# Patient Record
Sex: MALE | Race: BLACK OR AFRICAN AMERICAN | NOT HISPANIC OR LATINO | Employment: FULL TIME | URBAN - METROPOLITAN AREA
[De-identification: names, ages, dates, MRNs, and addresses within clinical notes are randomized per-mention and may not be internally consistent; named-entity substitution may affect disease eponyms.]

---

## 2017-07-08 ENCOUNTER — HOSPITAL ENCOUNTER (EMERGENCY)
Facility: HOSPITAL | Age: 57
End: 2017-07-08
Attending: EMERGENCY MEDICINE | Admitting: EMERGENCY MEDICINE
Payer: COMMERCIAL

## 2017-07-08 ENCOUNTER — HOSPITAL ENCOUNTER (INPATIENT)
Facility: HOSPITAL | Age: 57
LOS: 12 days | DRG: 760 | End: 2017-07-20
Attending: PSYCHIATRY & NEUROLOGY | Admitting: INTERNAL MEDICINE
Payer: COMMERCIAL

## 2017-07-08 VITALS
WEIGHT: 165 LBS | TEMPERATURE: 97.4 F | RESPIRATION RATE: 16 BRPM | OXYGEN SATURATION: 98 % | SYSTOLIC BLOOD PRESSURE: 182 MMHG | DIASTOLIC BLOOD PRESSURE: 98 MMHG | HEART RATE: 64 BPM

## 2017-07-08 DIAGNOSIS — R90.89 ABNORMAL BRAIN MRI: ICD-10-CM

## 2017-07-08 DIAGNOSIS — F22 PARANOID PSYCHOSIS (HCC): Primary | ICD-10-CM

## 2017-07-08 DIAGNOSIS — A52.3 NEUROSYPHILIS IN MALE: ICD-10-CM

## 2017-07-08 DIAGNOSIS — F22 PARANOIA (HCC): ICD-10-CM

## 2017-07-08 DIAGNOSIS — I10 HYPERTENSION: ICD-10-CM

## 2017-07-08 DIAGNOSIS — R45.851 SUICIDAL IDEATION: Primary | ICD-10-CM

## 2017-07-08 DIAGNOSIS — R46.89 CHANGE IN BEHAVIOR: ICD-10-CM

## 2017-07-08 LAB
ALBUMIN SERPL BCP-MCNC: 3.7 G/DL (ref 3.5–5)
ALP SERPL-CCNC: 59 U/L (ref 46–116)
ALT SERPL W P-5'-P-CCNC: 96 U/L (ref 12–78)
AMPHETAMINES SERPL QL SCN: NEGATIVE
ANION GAP SERPL CALCULATED.3IONS-SCNC: 10 MMOL/L (ref 4–13)
AST SERPL W P-5'-P-CCNC: 61 U/L (ref 5–45)
ATRIAL RATE: 54 BPM
BARBITURATES UR QL: NEGATIVE
BASOPHILS # BLD AUTO: 0.04 THOUSANDS/ΜL (ref 0–0.1)
BASOPHILS NFR BLD AUTO: 1 % (ref 0–1)
BENZODIAZ UR QL: NEGATIVE
BILIRUB SERPL-MCNC: 0.44 MG/DL (ref 0.2–1)
BUN SERPL-MCNC: 17 MG/DL (ref 5–25)
CALCIUM SERPL-MCNC: 8.5 MG/DL (ref 8.3–10.1)
CHLORIDE SERPL-SCNC: 105 MMOL/L (ref 100–108)
CO2 SERPL-SCNC: 26 MMOL/L (ref 21–32)
COCAINE UR QL: NEGATIVE
CREAT SERPL-MCNC: 1.05 MG/DL (ref 0.6–1.3)
EOSINOPHIL # BLD AUTO: 0.1 THOUSAND/ΜL (ref 0–0.61)
EOSINOPHIL NFR BLD AUTO: 1 % (ref 0–6)
ERYTHROCYTE [DISTWIDTH] IN BLOOD BY AUTOMATED COUNT: 14 % (ref 11.6–15.1)
ETHANOL EXG-MCNC: 0 MG/DL
GFR SERPL CREATININE-BSD FRML MDRD: >60 ML/MIN/1.73SQ M
GLUCOSE SERPL-MCNC: 91 MG/DL (ref 65–140)
HCT VFR BLD AUTO: 38.8 % (ref 36.5–49.3)
HGB BLD-MCNC: 13.3 G/DL (ref 12–17)
LYMPHOCYTES # BLD AUTO: 3.4 THOUSANDS/ΜL (ref 0.6–4.47)
LYMPHOCYTES NFR BLD AUTO: 48 % (ref 14–44)
MCH RBC QN AUTO: 31.1 PG (ref 26.8–34.3)
MCHC RBC AUTO-ENTMCNC: 34.3 G/DL (ref 31.4–37.4)
MCV RBC AUTO: 91 FL (ref 82–98)
METHADONE UR QL: NEGATIVE
MONOCYTES # BLD AUTO: 0.49 THOUSAND/ΜL (ref 0.17–1.22)
MONOCYTES NFR BLD AUTO: 7 % (ref 4–12)
NEUTROPHILS # BLD AUTO: 3.07 THOUSANDS/ΜL (ref 1.85–7.62)
NEUTS SEG NFR BLD AUTO: 43 % (ref 43–75)
NRBC BLD AUTO-RTO: 0 /100 WBCS
OPIATES UR QL SCN: NEGATIVE
P AXIS: 41 DEGREES
PCP UR QL: NEGATIVE
PLATELET # BLD AUTO: 233 THOUSANDS/UL (ref 149–390)
PMV BLD AUTO: 11.1 FL (ref 8.9–12.7)
POTASSIUM SERPL-SCNC: 3.1 MMOL/L (ref 3.5–5.3)
PR INTERVAL: 206 MS
PROT SERPL-MCNC: 7.4 G/DL (ref 6.4–8.2)
QRS AXIS: 67 DEGREES
QRSD INTERVAL: 86 MS
QT INTERVAL: 444 MS
QTC INTERVAL: 421 MS
RBC # BLD AUTO: 4.27 MILLION/UL (ref 3.88–5.62)
SODIUM SERPL-SCNC: 141 MMOL/L (ref 136–145)
T WAVE AXIS: 17 DEGREES
THC UR QL: NEGATIVE
TROPONIN I SERPL-MCNC: 0.03 NG/ML
TSH SERPL DL<=0.05 MIU/L-ACNC: 0.76 UIU/ML (ref 0.36–3.74)
VENTRICULAR RATE: 54 BPM
WBC # BLD AUTO: 7.1 THOUSAND/UL (ref 4.31–10.16)

## 2017-07-08 PROCEDURE — 36415 COLL VENOUS BLD VENIPUNCTURE: CPT | Performed by: EMERGENCY MEDICINE

## 2017-07-08 PROCEDURE — 80307 DRUG TEST PRSMV CHEM ANLYZR: CPT | Performed by: EMERGENCY MEDICINE

## 2017-07-08 PROCEDURE — 99285 EMERGENCY DEPT VISIT HI MDM: CPT

## 2017-07-08 PROCEDURE — 84443 ASSAY THYROID STIM HORMONE: CPT | Performed by: EMERGENCY MEDICINE

## 2017-07-08 PROCEDURE — 93005 ELECTROCARDIOGRAM TRACING: CPT | Performed by: EMERGENCY MEDICINE

## 2017-07-08 PROCEDURE — 80053 COMPREHEN METABOLIC PANEL: CPT | Performed by: EMERGENCY MEDICINE

## 2017-07-08 PROCEDURE — 85025 COMPLETE CBC W/AUTO DIFF WBC: CPT | Performed by: EMERGENCY MEDICINE

## 2017-07-08 PROCEDURE — 82075 ASSAY OF BREATH ETHANOL: CPT | Performed by: EMERGENCY MEDICINE

## 2017-07-08 PROCEDURE — 84484 ASSAY OF TROPONIN QUANT: CPT | Performed by: EMERGENCY MEDICINE

## 2017-07-08 RX ORDER — MAGNESIUM HYDROXIDE/ALUMINUM HYDROXICE/SIMETHICONE 120; 1200; 1200 MG/30ML; MG/30ML; MG/30ML
30 SUSPENSION ORAL EVERY 4 HOURS PRN
Status: DISCONTINUED | OUTPATIENT
Start: 2017-07-08 | End: 2017-07-20 | Stop reason: HOSPADM

## 2017-07-08 RX ORDER — LORAZEPAM 2 MG/ML
1 INJECTION INTRAMUSCULAR EVERY 6 HOURS PRN
Status: DISCONTINUED | OUTPATIENT
Start: 2017-07-08 | End: 2017-07-13

## 2017-07-08 RX ORDER — ACETAMINOPHEN 325 MG/1
650 TABLET ORAL EVERY 6 HOURS PRN
Status: DISCONTINUED | OUTPATIENT
Start: 2017-07-08 | End: 2017-07-20 | Stop reason: HOSPADM

## 2017-07-08 RX ORDER — LORAZEPAM 1 MG/1
2 TABLET ORAL ONCE
Status: COMPLETED | OUTPATIENT
Start: 2017-07-08 | End: 2017-07-08

## 2017-07-08 RX ORDER — HALOPERIDOL 5 MG
5 TABLET ORAL EVERY 8 HOURS PRN
Status: DISCONTINUED | OUTPATIENT
Start: 2017-07-08 | End: 2017-07-13

## 2017-07-08 RX ORDER — OLANZAPINE 10 MG/1
5 INJECTION, POWDER, LYOPHILIZED, FOR SOLUTION INTRAMUSCULAR EVERY 8 HOURS PRN
Status: DISCONTINUED | OUTPATIENT
Start: 2017-07-08 | End: 2017-07-20

## 2017-07-08 RX ORDER — OLANZAPINE 5 MG/1
5 TABLET ORAL EVERY 8 HOURS PRN
Status: DISCONTINUED | OUTPATIENT
Start: 2017-07-08 | End: 2017-07-20

## 2017-07-08 RX ORDER — HYDRALAZINE HYDROCHLORIDE 20 MG/ML
5 INJECTION INTRAMUSCULAR; INTRAVENOUS ONCE
Status: DISCONTINUED | OUTPATIENT
Start: 2017-07-08 | End: 2017-07-08

## 2017-07-08 RX ORDER — LORAZEPAM 1 MG/1
1 TABLET ORAL EVERY 8 HOURS PRN
Status: DISCONTINUED | OUTPATIENT
Start: 2017-07-08 | End: 2017-07-13

## 2017-07-08 RX ORDER — TRAZODONE HYDROCHLORIDE 50 MG/1
25 TABLET ORAL
Status: DISCONTINUED | OUTPATIENT
Start: 2017-07-08 | End: 2017-07-20

## 2017-07-08 RX ORDER — RISPERIDONE 1 MG/1
1 TABLET, ORALLY DISINTEGRATING ORAL EVERY 8 HOURS PRN
Status: DISCONTINUED | OUTPATIENT
Start: 2017-07-08 | End: 2017-07-20

## 2017-07-08 RX ORDER — ARIPIPRAZOLE 5 MG/1
5 TABLET ORAL
Status: DISCONTINUED | OUTPATIENT
Start: 2017-07-08 | End: 2017-07-10

## 2017-07-08 RX ADMIN — LORAZEPAM 2 MG: 1 TABLET ORAL at 08:00

## 2017-07-08 RX ADMIN — ARIPIPRAZOLE 5 MG: 5 TABLET ORAL at 22:44

## 2017-07-08 RX ADMIN — NICOTINE 1 PATCH: 7 PATCH, EXTENDED RELEASE TRANSDERMAL at 22:44

## 2017-07-09 LAB
ALBUMIN SERPL BCP-MCNC: 3.4 G/DL (ref 3.5–5)
ALP SERPL-CCNC: 57 U/L (ref 46–116)
ALT SERPL W P-5'-P-CCNC: 87 U/L (ref 12–78)
ANION GAP SERPL CALCULATED.3IONS-SCNC: 6 MMOL/L (ref 4–13)
AST SERPL W P-5'-P-CCNC: 53 U/L (ref 5–45)
BILIRUB SERPL-MCNC: 0.14 MG/DL (ref 0.2–1)
BUN SERPL-MCNC: 14 MG/DL (ref 5–25)
CALCIUM SERPL-MCNC: 8.6 MG/DL (ref 8.3–10.1)
CHLORIDE SERPL-SCNC: 109 MMOL/L (ref 100–108)
CO2 SERPL-SCNC: 29 MMOL/L (ref 21–32)
CREAT SERPL-MCNC: 0.88 MG/DL (ref 0.6–1.3)
GFR SERPL CREATININE-BSD FRML MDRD: >60 ML/MIN/1.73SQ M
GLUCOSE SERPL-MCNC: 102 MG/DL (ref 65–140)
POTASSIUM SERPL-SCNC: 4.2 MMOL/L (ref 3.5–5.3)
PROT SERPL-MCNC: 7.3 G/DL (ref 6.4–8.2)
SODIUM SERPL-SCNC: 144 MMOL/L (ref 136–145)

## 2017-07-09 PROCEDURE — 86592 SYPHILIS TEST NON-TREP QUAL: CPT | Performed by: PHYSICIAN ASSISTANT

## 2017-07-09 PROCEDURE — 86593 SYPHILIS TEST NON-TREP QUANT: CPT | Performed by: PHYSICIAN ASSISTANT

## 2017-07-09 PROCEDURE — 80053 COMPREHEN METABOLIC PANEL: CPT | Performed by: PHYSICIAN ASSISTANT

## 2017-07-09 PROCEDURE — 86780 TREPONEMA PALLIDUM: CPT | Performed by: PHYSICIAN ASSISTANT

## 2017-07-09 PROCEDURE — 93005 ELECTROCARDIOGRAM TRACING: CPT | Performed by: PHYSICIAN ASSISTANT

## 2017-07-09 RX ORDER — HYDRALAZINE HYDROCHLORIDE 25 MG/1
25 TABLET, FILM COATED ORAL 3 TIMES DAILY PRN
Status: DISCONTINUED | OUTPATIENT
Start: 2017-07-09 | End: 2017-07-20

## 2017-07-09 RX ADMIN — NICOTINE 1 PATCH: 7 PATCH, EXTENDED RELEASE TRANSDERMAL at 09:01

## 2017-07-09 RX ADMIN — HYDRALAZINE HYDROCHLORIDE 25 MG: 25 TABLET, FILM COATED ORAL at 08:59

## 2017-07-09 RX ADMIN — ARIPIPRAZOLE 5 MG: 5 TABLET ORAL at 21:32

## 2017-07-09 RX ADMIN — HYDRALAZINE HYDROCHLORIDE 25 MG: 25 TABLET, FILM COATED ORAL at 17:00

## 2017-07-10 LAB
ALBUMIN SERPL BCP-MCNC: 3.2 G/DL (ref 3.5–5)
ALP SERPL-CCNC: 53 U/L (ref 46–116)
ALT SERPL W P-5'-P-CCNC: 81 U/L (ref 12–78)
ANION GAP SERPL CALCULATED.3IONS-SCNC: 3 MMOL/L (ref 4–13)
AST SERPL W P-5'-P-CCNC: 45 U/L (ref 5–45)
ATRIAL RATE: 47 BPM
ATRIAL RATE: 53 BPM
BILIRUB SERPL-MCNC: 0.44 MG/DL (ref 0.2–1)
BUN SERPL-MCNC: 10 MG/DL (ref 5–25)
CALCIUM SERPL-MCNC: 8.5 MG/DL (ref 8.3–10.1)
CHLORIDE SERPL-SCNC: 105 MMOL/L (ref 100–108)
CO2 SERPL-SCNC: 29 MMOL/L (ref 21–32)
CREAT SERPL-MCNC: 0.76 MG/DL (ref 0.6–1.3)
GFR SERPL CREATININE-BSD FRML MDRD: >60 ML/MIN/1.73SQ M
GLUCOSE SERPL-MCNC: 92 MG/DL (ref 65–140)
P AXIS: 44 DEGREES
P AXIS: 56 DEGREES
POTASSIUM SERPL-SCNC: 3.9 MMOL/L (ref 3.5–5.3)
PR INTERVAL: 196 MS
PR INTERVAL: 206 MS
PROT SERPL-MCNC: 7 G/DL (ref 6.4–8.2)
QRS AXIS: 26 DEGREES
QRS AXIS: 56 DEGREES
QRSD INTERVAL: 100 MS
QRSD INTERVAL: 90 MS
QT INTERVAL: 466 MS
QT INTERVAL: 480 MS
QTC INTERVAL: 412 MS
QTC INTERVAL: 450 MS
RPR SER QL: REACTIVE
RPR SER-TITR: ABNORMAL {TITER}
SODIUM SERPL-SCNC: 137 MMOL/L (ref 136–145)
T WAVE AXIS: 105 DEGREES
T WAVE AXIS: 88 DEGREES
VENTRICULAR RATE: 47 BPM
VENTRICULAR RATE: 53 BPM

## 2017-07-10 PROCEDURE — 80053 COMPREHEN METABOLIC PANEL: CPT | Performed by: PHYSICIAN ASSISTANT

## 2017-07-10 RX ORDER — ARIPIPRAZOLE 10 MG/1
10 TABLET ORAL
Status: DISCONTINUED | OUTPATIENT
Start: 2017-07-10 | End: 2017-07-11

## 2017-07-10 RX ADMIN — ARIPIPRAZOLE 10 MG: 10 TABLET ORAL at 21:23

## 2017-07-10 RX ADMIN — LORAZEPAM 1 MG: 1 TABLET ORAL at 13:54

## 2017-07-10 RX ADMIN — NICOTINE 1 PATCH: 7 PATCH, EXTENDED RELEASE TRANSDERMAL at 09:32

## 2017-07-11 LAB — T PALLIDUM AB SER QL IF: REACTIVE

## 2017-07-11 PROCEDURE — 87389 HIV-1 AG W/HIV-1&-2 AB AG IA: CPT | Performed by: PSYCHIATRY & NEUROLOGY

## 2017-07-11 RX ORDER — ARIPIPRAZOLE 15 MG/1
15 TABLET ORAL
Status: DISCONTINUED | OUTPATIENT
Start: 2017-07-11 | End: 2017-07-19

## 2017-07-11 RX ADMIN — NICOTINE 1 PATCH: 7 PATCH, EXTENDED RELEASE TRANSDERMAL at 09:03

## 2017-07-11 RX ADMIN — ARIPIPRAZOLE 15 MG: 15 TABLET ORAL at 21:14

## 2017-07-12 RX ADMIN — NICOTINE 1 PATCH: 7 PATCH, EXTENDED RELEASE TRANSDERMAL at 08:30

## 2017-07-12 RX ADMIN — TRAZODONE HYDROCHLORIDE 25 MG: 50 TABLET ORAL at 21:27

## 2017-07-12 RX ADMIN — ARIPIPRAZOLE 15 MG: 15 TABLET ORAL at 21:25

## 2017-07-13 ENCOUNTER — APPOINTMENT (INPATIENT)
Dept: RADIOLOGY | Facility: HOSPITAL | Age: 57
DRG: 760 | End: 2017-07-13
Payer: COMMERCIAL

## 2017-07-13 ENCOUNTER — GENERIC CONVERSION - ENCOUNTER (OUTPATIENT)
Dept: OTHER | Facility: OTHER | Age: 57
End: 2017-07-13

## 2017-07-13 LAB
APPEARANCE CSF: CLEAR
APTT PPP: 29 SECONDS (ref 23–35)
ERYTHROCYTE [DISTWIDTH] IN BLOOD BY AUTOMATED COUNT: 13.7 % (ref 11.6–15.1)
GLUCOSE CSF-MCNC: 57 MG/DL (ref 50–80)
GLUCOSE SERPL-MCNC: 99 MG/DL (ref 65–140)
HCT VFR BLD AUTO: 45.6 % (ref 36.5–49.3)
HGB BLD-MCNC: 15.6 G/DL (ref 12–17)
HIV 1+2 AB+HIV1 P24 AG SERPL QL IA: NORMAL
INR PPP: 1 (ref 0.86–1.16)
LYMPHOCYTES NFR CSF MANUAL: 64 %
MCH RBC QN AUTO: 30.8 PG (ref 26.8–34.3)
MCHC RBC AUTO-ENTMCNC: 34.2 G/DL (ref 31.4–37.4)
MCV RBC AUTO: 90 FL (ref 82–98)
MONOS+MACROS CSF MANUAL: 36 %
PLATELET # BLD AUTO: 259 THOUSANDS/UL (ref 149–390)
PMV BLD AUTO: 11.3 FL (ref 8.9–12.7)
PROT CSF-MCNC: 39 MG/DL (ref 15–45)
PROTHROMBIN TIME: 13.2 SECONDS (ref 12.1–14.4)
RBC # BLD AUTO: 5.06 MILLION/UL (ref 3.88–5.62)
RBC # CSF MANUAL: 3 UL (ref 0–10)
TOTAL CELLS COUNTED BLD: NO
TOTAL CELLS COUNTED SPEC: 11
TUBE # CSF: 4
WBC # BLD AUTO: 7.88 THOUSAND/UL (ref 4.31–10.16)
WBC # CSF AUTO: 2 /UL (ref 0–5)

## 2017-07-13 PROCEDURE — 84157 ASSAY OF PROTEIN OTHER: CPT | Performed by: INTERNAL MEDICINE

## 2017-07-13 PROCEDURE — 89050 BODY FLUID CELL COUNT: CPT | Performed by: INTERNAL MEDICINE

## 2017-07-13 PROCEDURE — 62270 DX LMBR SPI PNXR: CPT

## 2017-07-13 PROCEDURE — 89051 BODY FLUID CELL COUNT: CPT | Performed by: INTERNAL MEDICINE

## 2017-07-13 PROCEDURE — 70450 CT HEAD/BRAIN W/O DYE: CPT

## 2017-07-13 PROCEDURE — 85027 COMPLETE CBC AUTOMATED: CPT | Performed by: INTERNAL MEDICINE

## 2017-07-13 PROCEDURE — 85610 PROTHROMBIN TIME: CPT | Performed by: INTERNAL MEDICINE

## 2017-07-13 PROCEDURE — 82948 REAGENT STRIP/BLOOD GLUCOSE: CPT

## 2017-07-13 PROCEDURE — 85730 THROMBOPLASTIN TIME PARTIAL: CPT | Performed by: INTERNAL MEDICINE

## 2017-07-13 PROCEDURE — 82945 GLUCOSE OTHER FLUID: CPT | Performed by: INTERNAL MEDICINE

## 2017-07-13 PROCEDURE — 86592 SYPHILIS TEST NON-TREP QUAL: CPT | Performed by: INTERNAL MEDICINE

## 2017-07-13 PROCEDURE — 009U3ZX DRAINAGE OF SPINAL CANAL, PERCUTANEOUS APPROACH, DIAGNOSTIC: ICD-10-PCS | Performed by: RADIOLOGY

## 2017-07-13 RX ORDER — LORAZEPAM 1 MG/1
1 TABLET ORAL EVERY 4 HOURS PRN
Status: DISCONTINUED | OUTPATIENT
Start: 2017-07-13 | End: 2017-07-19

## 2017-07-13 RX ORDER — HALOPERIDOL 5 MG
10 TABLET ORAL EVERY 4 HOURS PRN
Status: DISCONTINUED | OUTPATIENT
Start: 2017-07-13 | End: 2017-07-20

## 2017-07-13 RX ORDER — BENZTROPINE MESYLATE 1 MG/ML
1 INJECTION INTRAMUSCULAR; INTRAVENOUS EVERY 6 HOURS PRN
Status: DISCONTINUED | OUTPATIENT
Start: 2017-07-13 | End: 2017-07-13

## 2017-07-13 RX ORDER — BENZTROPINE MESYLATE 1 MG/1
1 TABLET ORAL EVERY 6 HOURS PRN
Status: DISCONTINUED | OUTPATIENT
Start: 2017-07-13 | End: 2017-07-20 | Stop reason: HOSPADM

## 2017-07-13 RX ORDER — LORAZEPAM 2 MG/ML
2 INJECTION INTRAMUSCULAR EVERY 4 HOURS PRN
Status: DISCONTINUED | OUTPATIENT
Start: 2017-07-13 | End: 2017-07-20

## 2017-07-13 RX ORDER — BENZTROPINE MESYLATE 1 MG/ML
2 INJECTION INTRAMUSCULAR; INTRAVENOUS EVERY 6 HOURS PRN
Status: DISCONTINUED | OUTPATIENT
Start: 2017-07-13 | End: 2017-07-20 | Stop reason: HOSPADM

## 2017-07-13 RX ADMIN — HALOPERIDOL 5 MG: 5 TABLET ORAL at 10:16

## 2017-07-13 RX ADMIN — LORAZEPAM 1 MG: 1 TABLET ORAL at 10:16

## 2017-07-13 RX ADMIN — ARIPIPRAZOLE 15 MG: 15 TABLET ORAL at 21:12

## 2017-07-13 RX ADMIN — NICOTINE 1 PATCH: 7 PATCH, EXTENDED RELEASE TRANSDERMAL at 08:59

## 2017-07-14 ENCOUNTER — APPOINTMENT (INPATIENT)
Dept: RADIOLOGY | Facility: HOSPITAL | Age: 57
DRG: 760 | End: 2017-07-14
Payer: COMMERCIAL

## 2017-07-14 LAB
CHOLEST SERPL-MCNC: 94 MG/DL (ref 50–200)
HDLC SERPL-MCNC: 56 MG/DL (ref 40–60)
LDLC SERPL CALC-MCNC: 25 MG/DL (ref 0–100)
TRIGL SERPL-MCNC: 64 MG/DL
VIT B12 SERPL-MCNC: 606 PG/ML (ref 100–900)

## 2017-07-14 PROCEDURE — 80061 LIPID PANEL: CPT | Performed by: PSYCHIATRY & NEUROLOGY

## 2017-07-14 PROCEDURE — A9585 GADOBUTROL INJECTION: HCPCS | Performed by: PSYCHIATRY & NEUROLOGY

## 2017-07-14 PROCEDURE — 82607 VITAMIN B-12: CPT | Performed by: PSYCHIATRY & NEUROLOGY

## 2017-07-14 PROCEDURE — 70553 MRI BRAIN STEM W/O & W/DYE: CPT

## 2017-07-14 RX ORDER — ATORVASTATIN CALCIUM 20 MG/1
20 TABLET, FILM COATED ORAL
Status: DISCONTINUED | OUTPATIENT
Start: 2017-07-14 | End: 2017-07-20 | Stop reason: HOSPADM

## 2017-07-14 RX ORDER — ASPIRIN 81 MG/1
81 TABLET ORAL DAILY
Status: DISCONTINUED | OUTPATIENT
Start: 2017-07-14 | End: 2017-07-20 | Stop reason: HOSPADM

## 2017-07-14 RX ADMIN — GADOBUTROL 7 ML: 604.72 INJECTION INTRAVENOUS at 08:39

## 2017-07-14 RX ADMIN — NICOTINE 1 PATCH: 7 PATCH, EXTENDED RELEASE TRANSDERMAL at 09:07

## 2017-07-14 RX ADMIN — ACETAMINOPHEN 650 MG: 325 TABLET, FILM COATED ORAL at 12:12

## 2017-07-14 RX ADMIN — ACETAMINOPHEN 650 MG: 325 TABLET, FILM COATED ORAL at 18:28

## 2017-07-14 RX ADMIN — ATORVASTATIN CALCIUM 20 MG: 20 TABLET, FILM COATED ORAL at 17:00

## 2017-07-14 RX ADMIN — ARIPIPRAZOLE 15 MG: 15 TABLET ORAL at 21:23

## 2017-07-14 RX ADMIN — OLANZAPINE 5 MG: 5 TABLET, FILM COATED ORAL at 06:01

## 2017-07-14 RX ADMIN — ASPIRIN 81 MG: 81 TABLET, COATED ORAL at 16:09

## 2017-07-15 ENCOUNTER — APPOINTMENT (INPATIENT)
Dept: RADIOLOGY | Facility: HOSPITAL | Age: 57
DRG: 760 | End: 2017-07-15
Payer: COMMERCIAL

## 2017-07-15 LAB
EST. AVERAGE GLUCOSE BLD GHB EST-MCNC: 128 MG/DL
HBA1C MFR BLD: 6.1 % (ref 4.2–6.3)

## 2017-07-15 PROCEDURE — 83036 HEMOGLOBIN GLYCOSYLATED A1C: CPT | Performed by: PSYCHIATRY & NEUROLOGY

## 2017-07-15 PROCEDURE — 70544 MR ANGIOGRAPHY HEAD W/O DYE: CPT

## 2017-07-15 RX ORDER — METHOCARBAMOL 500 MG/1
500 TABLET, FILM COATED ORAL EVERY 6 HOURS PRN
Status: DISCONTINUED | OUTPATIENT
Start: 2017-07-15 | End: 2017-07-20

## 2017-07-15 RX ORDER — MUSCLE RUB CREAM 100; 150 MG/G; MG/G
CREAM TOPICAL 4 TIMES DAILY PRN
Status: DISCONTINUED | OUTPATIENT
Start: 2017-07-15 | End: 2017-07-20

## 2017-07-15 RX ADMIN — NICOTINE 1 PATCH: 7 PATCH, EXTENDED RELEASE TRANSDERMAL at 08:33

## 2017-07-15 RX ADMIN — ASPIRIN 81 MG: 81 TABLET, COATED ORAL at 09:47

## 2017-07-15 RX ADMIN — ATORVASTATIN CALCIUM 20 MG: 20 TABLET, FILM COATED ORAL at 15:59

## 2017-07-15 RX ADMIN — ARIPIPRAZOLE 15 MG: 15 TABLET ORAL at 22:19

## 2017-07-15 RX ADMIN — ACETAMINOPHEN 650 MG: 325 TABLET, FILM COATED ORAL at 10:37

## 2017-07-16 RX ORDER — HYDROCHLOROTHIAZIDE 12.5 MG/1
12.5 TABLET ORAL DAILY
Status: DISCONTINUED | OUTPATIENT
Start: 2017-07-16 | End: 2017-07-20 | Stop reason: HOSPADM

## 2017-07-16 RX ORDER — HYDROCHLOROTHIAZIDE 12.5 MG/1
12.5 TABLET ORAL DAILY
Status: DISCONTINUED | OUTPATIENT
Start: 2017-07-17 | End: 2017-07-16

## 2017-07-16 RX ORDER — AMLODIPINE BESYLATE 5 MG/1
5 TABLET ORAL DAILY
Status: DISCONTINUED | OUTPATIENT
Start: 2017-07-17 | End: 2017-07-16

## 2017-07-16 RX ORDER — AMLODIPINE BESYLATE 5 MG/1
5 TABLET ORAL DAILY
Status: DISCONTINUED | OUTPATIENT
Start: 2017-07-16 | End: 2017-07-16

## 2017-07-16 RX ADMIN — HYDRALAZINE HYDROCHLORIDE 25 MG: 25 TABLET, FILM COATED ORAL at 08:34

## 2017-07-16 RX ADMIN — ASPIRIN 81 MG: 81 TABLET, COATED ORAL at 08:35

## 2017-07-16 RX ADMIN — HYDROCHLOROTHIAZIDE 12.5 MG: 12.5 TABLET ORAL at 11:18

## 2017-07-16 RX ADMIN — HYDRALAZINE HYDROCHLORIDE 25 MG: 25 TABLET, FILM COATED ORAL at 15:31

## 2017-07-16 RX ADMIN — MENTHOL, METHYL SALICYLATE 1 APPLICATION: 10; 15 CREAM TOPICAL at 14:01

## 2017-07-16 RX ADMIN — ACETAMINOPHEN 650 MG: 325 TABLET, FILM COATED ORAL at 08:34

## 2017-07-16 RX ADMIN — MENTHOL, METHYL SALICYLATE 1 APPLICATION: 10; 15 CREAM TOPICAL at 08:38

## 2017-07-16 RX ADMIN — NICOTINE 1 PATCH: 7 PATCH, EXTENDED RELEASE TRANSDERMAL at 08:38

## 2017-07-16 RX ADMIN — ATORVASTATIN CALCIUM 20 MG: 20 TABLET, FILM COATED ORAL at 16:08

## 2017-07-16 RX ADMIN — HYDRALAZINE HYDROCHLORIDE 25 MG: 25 TABLET, FILM COATED ORAL at 22:42

## 2017-07-16 RX ADMIN — ARIPIPRAZOLE 15 MG: 15 TABLET ORAL at 22:00

## 2017-07-17 LAB — REAGIN AB CSF QL: ABNORMAL

## 2017-07-17 RX ADMIN — ATORVASTATIN CALCIUM 20 MG: 20 TABLET, FILM COATED ORAL at 16:42

## 2017-07-17 RX ADMIN — HYDROCHLOROTHIAZIDE 12.5 MG: 12.5 TABLET ORAL at 08:02

## 2017-07-17 RX ADMIN — ARIPIPRAZOLE 15 MG: 15 TABLET ORAL at 23:52

## 2017-07-17 RX ADMIN — NICOTINE 1 PATCH: 7 PATCH, EXTENDED RELEASE TRANSDERMAL at 08:03

## 2017-07-17 RX ADMIN — ACETAMINOPHEN 650 MG: 325 TABLET, FILM COATED ORAL at 08:01

## 2017-07-17 RX ADMIN — ASPIRIN 81 MG: 81 TABLET, COATED ORAL at 08:01

## 2017-07-17 RX ADMIN — MENTHOL, METHYL SALICYLATE: 10; 15 CREAM TOPICAL at 08:02

## 2017-07-18 RX ORDER — LISINOPRIL 5 MG/1
5 TABLET ORAL DAILY
Status: DISCONTINUED | OUTPATIENT
Start: 2017-07-18 | End: 2017-07-19

## 2017-07-18 RX ADMIN — LISINOPRIL 5 MG: 5 TABLET ORAL at 10:53

## 2017-07-18 RX ADMIN — ATORVASTATIN CALCIUM 20 MG: 20 TABLET, FILM COATED ORAL at 17:19

## 2017-07-18 RX ADMIN — ASPIRIN 81 MG: 81 TABLET, COATED ORAL at 08:13

## 2017-07-18 RX ADMIN — ACETAMINOPHEN 650 MG: 325 TABLET, FILM COATED ORAL at 08:49

## 2017-07-18 RX ADMIN — MENTHOL, METHYL SALICYLATE 1 APPLICATION: 10; 15 CREAM TOPICAL at 17:19

## 2017-07-18 RX ADMIN — NICOTINE 1 PATCH: 7 PATCH, EXTENDED RELEASE TRANSDERMAL at 08:15

## 2017-07-18 RX ADMIN — HYDROCHLOROTHIAZIDE 12.5 MG: 12.5 TABLET ORAL at 08:13

## 2017-07-18 RX ADMIN — ARIPIPRAZOLE 15 MG: 15 TABLET ORAL at 21:30

## 2017-07-18 RX ADMIN — HYDRALAZINE HYDROCHLORIDE 25 MG: 25 TABLET, FILM COATED ORAL at 08:13

## 2017-07-19 LAB
ALBUMIN SERPL BCP-MCNC: 3.8 G/DL (ref 3.5–5)
ALP SERPL-CCNC: 70 U/L (ref 46–116)
ALT SERPL W P-5'-P-CCNC: 107 U/L (ref 12–78)
ANION GAP SERPL CALCULATED.3IONS-SCNC: 8 MMOL/L (ref 4–13)
AST SERPL W P-5'-P-CCNC: 54 U/L (ref 5–45)
BILIRUB SERPL-MCNC: 0.57 MG/DL (ref 0.2–1)
BUN SERPL-MCNC: 14 MG/DL (ref 5–25)
CALCIUM SERPL-MCNC: 9.3 MG/DL (ref 8.3–10.1)
CHLORIDE SERPL-SCNC: 100 MMOL/L (ref 100–108)
CO2 SERPL-SCNC: 30 MMOL/L (ref 21–32)
CREAT SERPL-MCNC: 0.9 MG/DL (ref 0.6–1.3)
GFR SERPL CREATININE-BSD FRML MDRD: >60 ML/MIN/1.73SQ M
GLUCOSE SERPL-MCNC: 111 MG/DL (ref 65–140)
POTASSIUM SERPL-SCNC: 4 MMOL/L (ref 3.5–5.3)
PROT SERPL-MCNC: 8.1 G/DL (ref 6.4–8.2)
SODIUM SERPL-SCNC: 138 MMOL/L (ref 136–145)

## 2017-07-19 PROCEDURE — 80053 COMPREHEN METABOLIC PANEL: CPT | Performed by: PHYSICIAN ASSISTANT

## 2017-07-19 RX ORDER — LORAZEPAM 1 MG/1
2 TABLET ORAL EVERY 4 HOURS PRN
Status: DISCONTINUED | OUTPATIENT
Start: 2017-07-19 | End: 2017-07-20

## 2017-07-19 RX ORDER — ARIPIPRAZOLE 10 MG/1
20 TABLET ORAL
Status: DISCONTINUED | OUTPATIENT
Start: 2017-07-19 | End: 2017-07-20 | Stop reason: HOSPADM

## 2017-07-19 RX ORDER — LISINOPRIL 10 MG/1
10 TABLET ORAL DAILY
Status: DISCONTINUED | OUTPATIENT
Start: 2017-07-20 | End: 2017-07-19

## 2017-07-19 RX ORDER — LISINOPRIL 5 MG/1
5 TABLET ORAL DAILY
Status: DISCONTINUED | OUTPATIENT
Start: 2017-07-20 | End: 2017-07-20 | Stop reason: HOSPADM

## 2017-07-19 RX ADMIN — NICOTINE 1 PATCH: 7 PATCH, EXTENDED RELEASE TRANSDERMAL at 08:12

## 2017-07-19 RX ADMIN — ASPIRIN 81 MG: 81 TABLET, COATED ORAL at 08:08

## 2017-07-19 RX ADMIN — HYDROCHLOROTHIAZIDE 12.5 MG: 12.5 TABLET ORAL at 08:08

## 2017-07-19 RX ADMIN — LORAZEPAM 2 MG: 1 TABLET ORAL at 10:46

## 2017-07-19 RX ADMIN — LISINOPRIL 5 MG: 5 TABLET ORAL at 08:08

## 2017-07-19 RX ADMIN — ARIPIPRAZOLE 20 MG: 10 TABLET ORAL at 21:26

## 2017-07-19 RX ADMIN — ATORVASTATIN CALCIUM 20 MG: 20 TABLET, FILM COATED ORAL at 16:40

## 2017-07-20 ENCOUNTER — HOSPITAL ENCOUNTER (INPATIENT)
Facility: HOSPITAL | Age: 57
LOS: 14 days | DRG: 042 | End: 2017-08-03
Attending: INTERNAL MEDICINE | Admitting: INTERNAL MEDICINE
Payer: COMMERCIAL

## 2017-07-20 VITALS
WEIGHT: 160.72 LBS | OXYGEN SATURATION: 100 % | HEIGHT: 69 IN | TEMPERATURE: 97.7 F | RESPIRATION RATE: 18 BRPM | SYSTOLIC BLOOD PRESSURE: 160 MMHG | DIASTOLIC BLOOD PRESSURE: 82 MMHG | BODY MASS INDEX: 23.8 KG/M2 | HEART RATE: 64 BPM

## 2017-07-20 DIAGNOSIS — B18.2 CHRONIC HEPATITIS C (HCC): ICD-10-CM

## 2017-07-20 DIAGNOSIS — A52.3 NEUROSYPHILIS IN ADULT: Primary | ICD-10-CM

## 2017-07-20 DIAGNOSIS — I10 BENIGN ESSENTIAL HTN: Chronic | ICD-10-CM

## 2017-07-20 PROBLEM — R73.03 PREDIABETES: Chronic | Status: ACTIVE | Noted: 2017-07-20

## 2017-07-20 PROBLEM — F22 PARANOID PSYCHOSIS (HCC): Status: RESOLVED | Noted: 2017-07-08 | Resolved: 2017-07-20

## 2017-07-20 PROBLEM — E78.5 HLD (HYPERLIPIDEMIA): Chronic | Status: ACTIVE | Noted: 2017-07-20

## 2017-07-20 PROBLEM — R74.8 ELEVATED LIVER ENZYMES: Status: ACTIVE | Noted: 2017-07-20

## 2017-07-20 PROBLEM — R74.8 ELEVATED LIVER ENZYMES: Chronic | Status: ACTIVE | Noted: 2017-07-20

## 2017-07-20 PROBLEM — F17.200 TOBACCO DEPENDENCE: Chronic | Status: ACTIVE | Noted: 2017-07-20

## 2017-07-20 LAB
PLATELET # BLD AUTO: 287 THOUSANDS/UL (ref 149–390)
PMV BLD AUTO: 11.1 FL (ref 8.9–12.7)

## 2017-07-20 PROCEDURE — 85049 AUTOMATED PLATELET COUNT: CPT | Performed by: INTERNAL MEDICINE

## 2017-07-20 RX ORDER — ATORVASTATIN CALCIUM 20 MG/1
20 TABLET, FILM COATED ORAL
Status: CANCELLED | OUTPATIENT
Start: 2017-07-20

## 2017-07-20 RX ORDER — SENNOSIDES 8.6 MG
1 TABLET ORAL
Status: DISCONTINUED | OUTPATIENT
Start: 2017-07-20 | End: 2017-07-20 | Stop reason: HOSPADM

## 2017-07-20 RX ORDER — HYDROCHLOROTHIAZIDE 12.5 MG/1
12.5 TABLET ORAL DAILY
Status: DISCONTINUED | OUTPATIENT
Start: 2017-07-20 | End: 2017-07-24

## 2017-07-20 RX ORDER — HYDRALAZINE HYDROCHLORIDE 20 MG/ML
5 INJECTION INTRAMUSCULAR; INTRAVENOUS EVERY 6 HOURS PRN
Status: DISCONTINUED | OUTPATIENT
Start: 2017-07-20 | End: 2017-08-03 | Stop reason: HOSPADM

## 2017-07-20 RX ORDER — DOCUSATE SODIUM 100 MG/1
100 CAPSULE, LIQUID FILLED ORAL 2 TIMES DAILY PRN
Status: DISCONTINUED | OUTPATIENT
Start: 2017-07-20 | End: 2017-07-20 | Stop reason: HOSPADM

## 2017-07-20 RX ORDER — HYDRALAZINE HYDROCHLORIDE 20 MG/ML
5 INJECTION INTRAMUSCULAR; INTRAVENOUS EVERY 6 HOURS PRN
Status: DISCONTINUED | OUTPATIENT
Start: 2017-07-20 | End: 2017-07-20 | Stop reason: HOSPADM

## 2017-07-20 RX ORDER — ONDANSETRON 2 MG/ML
4 INJECTION INTRAMUSCULAR; INTRAVENOUS EVERY 6 HOURS PRN
Status: DISCONTINUED | OUTPATIENT
Start: 2017-07-20 | End: 2017-07-20 | Stop reason: HOSPADM

## 2017-07-20 RX ORDER — ACETAMINOPHEN 325 MG/1
650 TABLET ORAL EVERY 6 HOURS PRN
Status: DISCONTINUED | OUTPATIENT
Start: 2017-07-20 | End: 2017-08-03 | Stop reason: HOSPADM

## 2017-07-20 RX ORDER — ARIPIPRAZOLE 10 MG/1
20 TABLET ORAL
Status: DISCONTINUED | OUTPATIENT
Start: 2017-07-20 | End: 2017-08-03 | Stop reason: HOSPADM

## 2017-07-20 RX ORDER — ASPIRIN 81 MG/1
81 TABLET ORAL DAILY
Status: CANCELLED | OUTPATIENT
Start: 2017-07-21

## 2017-07-20 RX ORDER — LISINOPRIL 5 MG/1
5 TABLET ORAL DAILY
Status: DISCONTINUED | OUTPATIENT
Start: 2017-07-20 | End: 2017-07-22

## 2017-07-20 RX ORDER — ASPIRIN 81 MG/1
81 TABLET, CHEWABLE ORAL DAILY
Status: DISCONTINUED | OUTPATIENT
Start: 2017-07-20 | End: 2017-08-03 | Stop reason: HOSPADM

## 2017-07-20 RX ORDER — SENNOSIDES 8.6 MG
1 TABLET ORAL
Status: DISCONTINUED | OUTPATIENT
Start: 2017-07-20 | End: 2017-08-03 | Stop reason: HOSPADM

## 2017-07-20 RX ORDER — ATORVASTATIN CALCIUM 20 MG/1
20 TABLET, FILM COATED ORAL
Status: DISCONTINUED | OUTPATIENT
Start: 2017-07-20 | End: 2017-08-03 | Stop reason: HOSPADM

## 2017-07-20 RX ORDER — ONDANSETRON 2 MG/ML
4 INJECTION INTRAMUSCULAR; INTRAVENOUS EVERY 6 HOURS PRN
Status: DISCONTINUED | OUTPATIENT
Start: 2017-07-20 | End: 2017-08-03 | Stop reason: HOSPADM

## 2017-07-20 RX ORDER — DOCUSATE SODIUM 100 MG/1
100 CAPSULE, LIQUID FILLED ORAL 2 TIMES DAILY PRN
Status: DISCONTINUED | OUTPATIENT
Start: 2017-07-20 | End: 2017-08-03 | Stop reason: HOSPADM

## 2017-07-20 RX ORDER — ARIPIPRAZOLE 10 MG/1
20 TABLET ORAL
Status: CANCELLED | OUTPATIENT
Start: 2017-07-20

## 2017-07-20 RX ADMIN — SODIUM CHLORIDE 4 MILLION UNITS: 9 INJECTION, SOLUTION INTRAVENOUS at 15:50

## 2017-07-20 RX ADMIN — ARIPIPRAZOLE 20 MG: 10 TABLET ORAL at 22:27

## 2017-07-20 RX ADMIN — HYDROCHLOROTHIAZIDE 12.5 MG: 12.5 TABLET ORAL at 08:18

## 2017-07-20 RX ADMIN — LISINOPRIL 5 MG: 5 TABLET ORAL at 08:18

## 2017-07-20 RX ADMIN — SODIUM CHLORIDE 4 MILLION UNITS: 9 INJECTION, SOLUTION INTRAVENOUS at 18:39

## 2017-07-20 RX ADMIN — ATORVASTATIN CALCIUM 20 MG: 20 TABLET, FILM COATED ORAL at 15:49

## 2017-07-20 RX ADMIN — ASPIRIN 81 MG: 81 TABLET, COATED ORAL at 08:18

## 2017-07-20 RX ADMIN — NICOTINE 1 PATCH: 7 PATCH, EXTENDED RELEASE TRANSDERMAL at 08:21

## 2017-07-20 RX ADMIN — SODIUM CHLORIDE 4 MILLION UNITS: 9 INJECTION, SOLUTION INTRAVENOUS at 22:29

## 2017-07-20 RX ADMIN — ENOXAPARIN SODIUM 40 MG: 40 INJECTION SUBCUTANEOUS at 15:49

## 2017-07-21 ENCOUNTER — GENERIC CONVERSION - ENCOUNTER (OUTPATIENT)
Dept: OTHER | Facility: OTHER | Age: 57
End: 2017-07-21

## 2017-07-21 LAB
ALBUMIN SERPL BCP-MCNC: 3.2 G/DL (ref 3.5–5)
ALP SERPL-CCNC: 61 U/L (ref 46–116)
ALT SERPL W P-5'-P-CCNC: 93 U/L (ref 12–78)
ANION GAP SERPL CALCULATED.3IONS-SCNC: 6 MMOL/L (ref 4–13)
AST SERPL W P-5'-P-CCNC: 46 U/L (ref 5–45)
BILIRUB SERPL-MCNC: 0.43 MG/DL (ref 0.2–1)
BUN SERPL-MCNC: 16 MG/DL (ref 5–25)
CALCIUM SERPL-MCNC: 8.5 MG/DL (ref 8.3–10.1)
CHLORIDE SERPL-SCNC: 102 MMOL/L (ref 100–108)
CO2 SERPL-SCNC: 29 MMOL/L (ref 21–32)
CREAT SERPL-MCNC: 0.82 MG/DL (ref 0.6–1.3)
ERYTHROCYTE [DISTWIDTH] IN BLOOD BY AUTOMATED COUNT: 13.2 % (ref 11.6–15.1)
GFR SERPL CREATININE-BSD FRML MDRD: >60 ML/MIN/1.73SQ M
GLUCOSE SERPL-MCNC: 105 MG/DL (ref 65–140)
HCT VFR BLD AUTO: 41.9 % (ref 36.5–49.3)
HGB BLD-MCNC: 13.9 G/DL (ref 12–17)
MAGNESIUM SERPL-MCNC: 2.3 MG/DL (ref 1.6–2.6)
MCH RBC QN AUTO: 30.1 PG (ref 26.8–34.3)
MCHC RBC AUTO-ENTMCNC: 33.2 G/DL (ref 31.4–37.4)
MCV RBC AUTO: 91 FL (ref 82–98)
PHOSPHATE SERPL-MCNC: 3.3 MG/DL (ref 2.7–4.5)
PLATELET # BLD AUTO: 283 THOUSANDS/UL (ref 149–390)
PMV BLD AUTO: 10.8 FL (ref 8.9–12.7)
POTASSIUM SERPL-SCNC: 4 MMOL/L (ref 3.5–5.3)
PROT SERPL-MCNC: 6.9 G/DL (ref 6.4–8.2)
RBC # BLD AUTO: 4.62 MILLION/UL (ref 3.88–5.62)
SODIUM SERPL-SCNC: 137 MMOL/L (ref 136–145)
WBC # BLD AUTO: 7.12 THOUSAND/UL (ref 4.31–10.16)

## 2017-07-21 PROCEDURE — 83735 ASSAY OF MAGNESIUM: CPT | Performed by: INTERNAL MEDICINE

## 2017-07-21 PROCEDURE — 02HV33Z INSERTION OF INFUSION DEVICE INTO SUPERIOR VENA CAVA, PERCUTANEOUS APPROACH: ICD-10-PCS | Performed by: INTERNAL MEDICINE

## 2017-07-21 PROCEDURE — C1751 CATH, INF, PER/CENT/MIDLINE: HCPCS

## 2017-07-21 PROCEDURE — 84100 ASSAY OF PHOSPHORUS: CPT | Performed by: INTERNAL MEDICINE

## 2017-07-21 PROCEDURE — 85027 COMPLETE CBC AUTOMATED: CPT | Performed by: INTERNAL MEDICINE

## 2017-07-21 PROCEDURE — 80053 COMPREHEN METABOLIC PANEL: CPT | Performed by: INTERNAL MEDICINE

## 2017-07-21 PROCEDURE — 36569 INSJ PICC 5 YR+ W/O IMAGING: CPT

## 2017-07-21 RX ORDER — LORAZEPAM 1 MG/1
1 TABLET ORAL EVERY 4 HOURS PRN
Status: DISCONTINUED | OUTPATIENT
Start: 2017-07-21 | End: 2017-08-03 | Stop reason: HOSPADM

## 2017-07-21 RX ORDER — SACCHAROMYCES BOULARDII 250 MG
250 CAPSULE ORAL 2 TIMES DAILY
Status: DISCONTINUED | OUTPATIENT
Start: 2017-07-21 | End: 2017-08-03 | Stop reason: HOSPADM

## 2017-07-21 RX ORDER — LORAZEPAM 2 MG/ML
1 INJECTION INTRAMUSCULAR EVERY 6 HOURS PRN
Status: DISCONTINUED | OUTPATIENT
Start: 2017-07-21 | End: 2017-08-03 | Stop reason: HOSPADM

## 2017-07-21 RX ADMIN — SODIUM CHLORIDE 4 MILLION UNITS: 9 INJECTION, SOLUTION INTRAVENOUS at 20:42

## 2017-07-21 RX ADMIN — SODIUM CHLORIDE 4 MILLION UNITS: 9 INJECTION, SOLUTION INTRAVENOUS at 15:57

## 2017-07-21 RX ADMIN — NICOTINE 1 PATCH: 7 PATCH, EXTENDED RELEASE TRANSDERMAL at 09:22

## 2017-07-21 RX ADMIN — ASPIRIN 81 MG 81 MG: 81 TABLET ORAL at 09:22

## 2017-07-21 RX ADMIN — ARIPIPRAZOLE 20 MG: 10 TABLET ORAL at 22:29

## 2017-07-21 RX ADMIN — SODIUM CHLORIDE 4 MILLION UNITS: 9 INJECTION, SOLUTION INTRAVENOUS at 12:46

## 2017-07-21 RX ADMIN — HYDROCHLOROTHIAZIDE 12.5 MG: 12.5 TABLET ORAL at 09:22

## 2017-07-21 RX ADMIN — ENOXAPARIN SODIUM 40 MG: 40 INJECTION SUBCUTANEOUS at 09:22

## 2017-07-21 RX ADMIN — SODIUM CHLORIDE 4 MILLION UNITS: 9 INJECTION, SOLUTION INTRAVENOUS at 06:03

## 2017-07-21 RX ADMIN — ATORVASTATIN CALCIUM 20 MG: 20 TABLET, FILM COATED ORAL at 15:57

## 2017-07-21 RX ADMIN — LISINOPRIL 5 MG: 5 TABLET ORAL at 09:22

## 2017-07-21 RX ADMIN — LORAZEPAM 1 MG: 1 TABLET ORAL at 22:29

## 2017-07-21 RX ADMIN — SODIUM CHLORIDE 4 MILLION UNITS: 9 INJECTION, SOLUTION INTRAVENOUS at 02:50

## 2017-07-21 RX ADMIN — Medication 250 MG: at 12:46

## 2017-07-22 ENCOUNTER — APPOINTMENT (INPATIENT)
Dept: NON INVASIVE DIAGNOSTICS | Facility: HOSPITAL | Age: 57
DRG: 042 | End: 2017-07-22
Payer: COMMERCIAL

## 2017-07-22 PROBLEM — F29 PSYCHOTIC DISORDER (HCC): Status: ACTIVE | Noted: 2017-07-22

## 2017-07-22 PROCEDURE — 86803 HEPATITIS C AB TEST: CPT | Performed by: INTERNAL MEDICINE

## 2017-07-22 PROCEDURE — 86705 HEP B CORE ANTIBODY IGM: CPT | Performed by: INTERNAL MEDICINE

## 2017-07-22 PROCEDURE — 93306 TTE W/DOPPLER COMPLETE: CPT

## 2017-07-22 PROCEDURE — 86704 HEP B CORE ANTIBODY TOTAL: CPT | Performed by: INTERNAL MEDICINE

## 2017-07-22 PROCEDURE — 87340 HEPATITIS B SURFACE AG IA: CPT | Performed by: INTERNAL MEDICINE

## 2017-07-22 RX ORDER — LISINOPRIL 5 MG/1
5 TABLET ORAL ONCE
Status: COMPLETED | OUTPATIENT
Start: 2017-07-22 | End: 2017-07-22

## 2017-07-22 RX ORDER — LISINOPRIL 10 MG/1
10 TABLET ORAL DAILY
Status: DISCONTINUED | OUTPATIENT
Start: 2017-07-23 | End: 2017-07-26

## 2017-07-22 RX ADMIN — Medication 250 MG: at 08:22

## 2017-07-22 RX ADMIN — NICOTINE 1 PATCH: 7 PATCH, EXTENDED RELEASE TRANSDERMAL at 08:22

## 2017-07-22 RX ADMIN — Medication 250 MG: at 17:12

## 2017-07-22 RX ADMIN — Medication 250 MG: at 00:04

## 2017-07-22 RX ADMIN — SODIUM CHLORIDE 4 MILLION UNITS: 9 INJECTION, SOLUTION INTRAVENOUS at 17:12

## 2017-07-22 RX ADMIN — ARIPIPRAZOLE 20 MG: 10 TABLET ORAL at 22:27

## 2017-07-22 RX ADMIN — SODIUM CHLORIDE 4 MILLION UNITS: 9 INJECTION, SOLUTION INTRAVENOUS at 08:22

## 2017-07-22 RX ADMIN — ENOXAPARIN SODIUM 40 MG: 40 INJECTION SUBCUTANEOUS at 08:21

## 2017-07-22 RX ADMIN — ASPIRIN 81 MG 81 MG: 81 TABLET ORAL at 08:22

## 2017-07-22 RX ADMIN — HYDROCHLOROTHIAZIDE 12.5 MG: 12.5 TABLET ORAL at 08:24

## 2017-07-22 RX ADMIN — SODIUM CHLORIDE 4 MILLION UNITS: 9 INJECTION, SOLUTION INTRAVENOUS at 11:45

## 2017-07-22 RX ADMIN — SODIUM CHLORIDE 4 MILLION UNITS: 9 INJECTION, SOLUTION INTRAVENOUS at 00:03

## 2017-07-22 RX ADMIN — LISINOPRIL 5 MG: 5 TABLET ORAL at 11:45

## 2017-07-22 RX ADMIN — LISINOPRIL 5 MG: 5 TABLET ORAL at 08:21

## 2017-07-22 RX ADMIN — ATORVASTATIN CALCIUM 20 MG: 20 TABLET, FILM COATED ORAL at 17:12

## 2017-07-22 RX ADMIN — SODIUM CHLORIDE 4 MILLION UNITS: 9 INJECTION, SOLUTION INTRAVENOUS at 19:54

## 2017-07-23 LAB
HBV CORE AB SER QL: REACTIVE
HBV CORE IGM SER QL: ABNORMAL
HBV SURFACE AG SER QL: ABNORMAL
HCV AB SER QL: ABNORMAL

## 2017-07-23 RX ADMIN — ENOXAPARIN SODIUM 40 MG: 40 INJECTION SUBCUTANEOUS at 08:12

## 2017-07-23 RX ADMIN — ASPIRIN 81 MG 81 MG: 81 TABLET ORAL at 08:12

## 2017-07-23 RX ADMIN — LORAZEPAM 1 MG: 1 TABLET ORAL at 23:37

## 2017-07-23 RX ADMIN — SODIUM CHLORIDE 4 MILLION UNITS: 9 INJECTION, SOLUTION INTRAVENOUS at 23:37

## 2017-07-23 RX ADMIN — LORAZEPAM 1 MG: 1 TABLET ORAL at 00:36

## 2017-07-23 RX ADMIN — NICOTINE 1 PATCH: 7 PATCH, EXTENDED RELEASE TRANSDERMAL at 08:12

## 2017-07-23 RX ADMIN — HYDROCHLOROTHIAZIDE 12.5 MG: 12.5 TABLET ORAL at 08:12

## 2017-07-23 RX ADMIN — SODIUM CHLORIDE 4 MILLION UNITS: 9 INJECTION, SOLUTION INTRAVENOUS at 08:12

## 2017-07-23 RX ADMIN — ARIPIPRAZOLE 20 MG: 10 TABLET ORAL at 22:34

## 2017-07-23 RX ADMIN — Medication 250 MG: at 20:29

## 2017-07-23 RX ADMIN — ATORVASTATIN CALCIUM 20 MG: 20 TABLET, FILM COATED ORAL at 16:51

## 2017-07-23 RX ADMIN — SODIUM CHLORIDE 4 MILLION UNITS: 9 INJECTION, SOLUTION INTRAVENOUS at 16:51

## 2017-07-23 RX ADMIN — SODIUM CHLORIDE 4 MILLION UNITS: 9 INJECTION, SOLUTION INTRAVENOUS at 04:06

## 2017-07-23 RX ADMIN — Medication 250 MG: at 08:12

## 2017-07-23 RX ADMIN — LISINOPRIL 10 MG: 10 TABLET ORAL at 08:12

## 2017-07-23 RX ADMIN — SODIUM CHLORIDE 4 MILLION UNITS: 9 INJECTION, SOLUTION INTRAVENOUS at 20:29

## 2017-07-23 RX ADMIN — SODIUM CHLORIDE 4 MILLION UNITS: 9 INJECTION, SOLUTION INTRAVENOUS at 11:21

## 2017-07-23 RX ADMIN — Medication 250 MG: at 16:51

## 2017-07-23 RX ADMIN — SODIUM CHLORIDE 4 MILLION UNITS: 9 INJECTION, SOLUTION INTRAVENOUS at 00:11

## 2017-07-24 ENCOUNTER — APPOINTMENT (INPATIENT)
Dept: RADIOLOGY | Facility: HOSPITAL | Age: 57
DRG: 042 | End: 2017-07-24
Payer: COMMERCIAL

## 2017-07-24 PROCEDURE — 87902 NFCT AGT GNTYP ALYS HEP C: CPT | Performed by: INTERNAL MEDICINE

## 2017-07-24 PROCEDURE — 87522 HEPATITIS C REVRS TRNSCRPJ: CPT | Performed by: INTERNAL MEDICINE

## 2017-07-24 PROCEDURE — 76705 ECHO EXAM OF ABDOMEN: CPT

## 2017-07-24 RX ORDER — HYDROCHLOROTHIAZIDE 25 MG/1
25 TABLET ORAL DAILY
Status: DISCONTINUED | OUTPATIENT
Start: 2017-07-25 | End: 2017-07-26

## 2017-07-24 RX ADMIN — ASPIRIN 81 MG 81 MG: 81 TABLET ORAL at 08:17

## 2017-07-24 RX ADMIN — ENOXAPARIN SODIUM 40 MG: 40 INJECTION SUBCUTANEOUS at 08:21

## 2017-07-24 RX ADMIN — SODIUM CHLORIDE 4 MILLION UNITS: 9 INJECTION, SOLUTION INTRAVENOUS at 12:19

## 2017-07-24 RX ADMIN — Medication 250 MG: at 16:27

## 2017-07-24 RX ADMIN — SODIUM CHLORIDE 4 MILLION UNITS: 9 INJECTION, SOLUTION INTRAVENOUS at 20:01

## 2017-07-24 RX ADMIN — SODIUM CHLORIDE 4 MILLION UNITS: 9 INJECTION, SOLUTION INTRAVENOUS at 16:27

## 2017-07-24 RX ADMIN — Medication 250 MG: at 08:17

## 2017-07-24 RX ADMIN — HYDRALAZINE HYDROCHLORIDE 5 MG: 20 INJECTION INTRAMUSCULAR; INTRAVENOUS at 00:27

## 2017-07-24 RX ADMIN — SODIUM CHLORIDE 4 MILLION UNITS: 9 INJECTION, SOLUTION INTRAVENOUS at 08:15

## 2017-07-24 RX ADMIN — LISINOPRIL 10 MG: 10 TABLET ORAL at 08:17

## 2017-07-24 RX ADMIN — ARIPIPRAZOLE 20 MG: 10 TABLET ORAL at 21:22

## 2017-07-24 RX ADMIN — SODIUM CHLORIDE 4 MILLION UNITS: 9 INJECTION, SOLUTION INTRAVENOUS at 03:43

## 2017-07-24 RX ADMIN — NICOTINE 1 PATCH: 7 PATCH, EXTENDED RELEASE TRANSDERMAL at 08:17

## 2017-07-24 RX ADMIN — ATORVASTATIN CALCIUM 20 MG: 20 TABLET, FILM COATED ORAL at 16:27

## 2017-07-24 RX ADMIN — HYDROCHLOROTHIAZIDE 12.5 MG: 12.5 TABLET ORAL at 08:18

## 2017-07-24 RX ADMIN — HYDRALAZINE HYDROCHLORIDE 5 MG: 20 INJECTION INTRAMUSCULAR; INTRAVENOUS at 16:27

## 2017-07-25 PROBLEM — B19.20 HEPATITIS C: Status: ACTIVE | Noted: 2017-07-25

## 2017-07-25 LAB — HBV SURFACE AB SER-ACNC: <3.1 MIU/ML

## 2017-07-25 PROCEDURE — 86706 HEP B SURFACE ANTIBODY: CPT | Performed by: PHYSICIAN ASSISTANT

## 2017-07-25 RX ADMIN — Medication 250 MG: at 16:56

## 2017-07-25 RX ADMIN — SODIUM CHLORIDE 4 MILLION UNITS: 9 INJECTION, SOLUTION INTRAVENOUS at 04:09

## 2017-07-25 RX ADMIN — SODIUM CHLORIDE 4 MILLION UNITS: 9 INJECTION, SOLUTION INTRAVENOUS at 16:06

## 2017-07-25 RX ADMIN — ATORVASTATIN CALCIUM 20 MG: 20 TABLET, FILM COATED ORAL at 16:56

## 2017-07-25 RX ADMIN — LISINOPRIL 10 MG: 10 TABLET ORAL at 08:01

## 2017-07-25 RX ADMIN — ARIPIPRAZOLE 20 MG: 10 TABLET ORAL at 21:40

## 2017-07-25 RX ADMIN — ENOXAPARIN SODIUM 40 MG: 40 INJECTION SUBCUTANEOUS at 08:02

## 2017-07-25 RX ADMIN — SODIUM CHLORIDE 4 MILLION UNITS: 9 INJECTION, SOLUTION INTRAVENOUS at 00:12

## 2017-07-25 RX ADMIN — HYDRALAZINE HYDROCHLORIDE 5 MG: 20 INJECTION INTRAMUSCULAR; INTRAVENOUS at 03:09

## 2017-07-25 RX ADMIN — SODIUM CHLORIDE 4 MILLION UNITS: 9 INJECTION, SOLUTION INTRAVENOUS at 21:00

## 2017-07-25 RX ADMIN — HYDROCHLOROTHIAZIDE 25 MG: 25 TABLET ORAL at 08:04

## 2017-07-25 RX ADMIN — ASPIRIN 81 MG 81 MG: 81 TABLET ORAL at 08:01

## 2017-07-25 RX ADMIN — SODIUM CHLORIDE 4 MILLION UNITS: 9 INJECTION, SOLUTION INTRAVENOUS at 08:37

## 2017-07-25 RX ADMIN — SODIUM CHLORIDE 4 MILLION UNITS: 9 INJECTION, SOLUTION INTRAVENOUS at 11:46

## 2017-07-25 RX ADMIN — NICOTINE 1 PATCH: 7 PATCH, EXTENDED RELEASE TRANSDERMAL at 08:00

## 2017-07-25 RX ADMIN — Medication 250 MG: at 08:01

## 2017-07-26 LAB
ALBUMIN SERPL BCP-MCNC: 3.2 G/DL (ref 3.5–5)
ALP SERPL-CCNC: 66 U/L (ref 46–116)
ALT SERPL W P-5'-P-CCNC: 106 U/L (ref 12–78)
ANION GAP SERPL CALCULATED.3IONS-SCNC: 6 MMOL/L (ref 4–13)
AST SERPL W P-5'-P-CCNC: 55 U/L (ref 5–45)
BILIRUB SERPL-MCNC: 0.29 MG/DL (ref 0.2–1)
BUN SERPL-MCNC: 10 MG/DL (ref 5–25)
CALCIUM SERPL-MCNC: 8.9 MG/DL (ref 8.3–10.1)
CHLORIDE SERPL-SCNC: 101 MMOL/L (ref 100–108)
CO2 SERPL-SCNC: 28 MMOL/L (ref 21–32)
CREAT SERPL-MCNC: 0.91 MG/DL (ref 0.6–1.3)
ERYTHROCYTE [DISTWIDTH] IN BLOOD BY AUTOMATED COUNT: 13.5 % (ref 11.6–15.1)
GFR SERPL CREATININE-BSD FRML MDRD: 95 ML/MIN/1.73SQ M
GLUCOSE SERPL-MCNC: 190 MG/DL (ref 65–140)
HCT VFR BLD AUTO: 42.6 % (ref 36.5–49.3)
HCV RNA SERPL NAA+PROBE-ACNC: NORMAL IU/ML
HCV RNA SERPL NAA+PROBE-LOG IU: 5.53 LOG10 IU/ML
HGB BLD-MCNC: 14.3 G/DL (ref 12–17)
MCH RBC QN AUTO: 30.4 PG (ref 26.8–34.3)
MCHC RBC AUTO-ENTMCNC: 33.6 G/DL (ref 31.4–37.4)
MCV RBC AUTO: 90 FL (ref 82–98)
PLATELET # BLD AUTO: 274 THOUSANDS/UL (ref 149–390)
PMV BLD AUTO: 10.2 FL (ref 8.9–12.7)
POTASSIUM SERPL-SCNC: 4.1 MMOL/L (ref 3.5–5.3)
PROT SERPL-MCNC: 7.3 G/DL (ref 6.4–8.2)
RBC # BLD AUTO: 4.71 MILLION/UL (ref 3.88–5.62)
SODIUM SERPL-SCNC: 135 MMOL/L (ref 136–145)
TEST INFORMATION: NORMAL
WBC # BLD AUTO: 7.59 THOUSAND/UL (ref 4.31–10.16)

## 2017-07-26 PROCEDURE — 80053 COMPREHEN METABOLIC PANEL: CPT | Performed by: INTERNAL MEDICINE

## 2017-07-26 PROCEDURE — 85027 COMPLETE CBC AUTOMATED: CPT | Performed by: INTERNAL MEDICINE

## 2017-07-26 RX ORDER — HYDROCHLOROTHIAZIDE 25 MG/1
50 TABLET ORAL DAILY
Status: DISCONTINUED | OUTPATIENT
Start: 2017-07-26 | End: 2017-08-03 | Stop reason: HOSPADM

## 2017-07-26 RX ORDER — LISINOPRIL 20 MG/1
20 TABLET ORAL DAILY
Status: DISCONTINUED | OUTPATIENT
Start: 2017-07-27 | End: 2017-07-31

## 2017-07-26 RX ADMIN — SODIUM CHLORIDE 4 MILLION UNITS: 9 INJECTION, SOLUTION INTRAVENOUS at 11:51

## 2017-07-26 RX ADMIN — Medication 250 MG: at 08:58

## 2017-07-26 RX ADMIN — SODIUM CHLORIDE 4 MILLION UNITS: 9 INJECTION, SOLUTION INTRAVENOUS at 01:15

## 2017-07-26 RX ADMIN — LISINOPRIL 10 MG: 10 TABLET ORAL at 08:58

## 2017-07-26 RX ADMIN — SODIUM CHLORIDE 4 MILLION UNITS: 9 INJECTION, SOLUTION INTRAVENOUS at 16:16

## 2017-07-26 RX ADMIN — LORAZEPAM 1 MG: 1 TABLET ORAL at 13:16

## 2017-07-26 RX ADMIN — SODIUM CHLORIDE 4 MILLION UNITS: 9 INJECTION, SOLUTION INTRAVENOUS at 23:22

## 2017-07-26 RX ADMIN — HYDROCHLOROTHIAZIDE 50 MG: 25 TABLET ORAL at 08:58

## 2017-07-26 RX ADMIN — ASPIRIN 81 MG 81 MG: 81 TABLET ORAL at 08:58

## 2017-07-26 RX ADMIN — Medication 250 MG: at 17:08

## 2017-07-26 RX ADMIN — SODIUM CHLORIDE 4 MILLION UNITS: 9 INJECTION, SOLUTION INTRAVENOUS at 08:01

## 2017-07-26 RX ADMIN — SODIUM CHLORIDE 4 MILLION UNITS: 9 INJECTION, SOLUTION INTRAVENOUS at 19:51

## 2017-07-26 RX ADMIN — SODIUM CHLORIDE 4 MILLION UNITS: 9 INJECTION, SOLUTION INTRAVENOUS at 04:25

## 2017-07-26 RX ADMIN — ENOXAPARIN SODIUM 40 MG: 40 INJECTION SUBCUTANEOUS at 08:58

## 2017-07-26 RX ADMIN — ARIPIPRAZOLE 20 MG: 10 TABLET ORAL at 21:16

## 2017-07-26 RX ADMIN — NICOTINE 1 PATCH: 7 PATCH, EXTENDED RELEASE TRANSDERMAL at 08:59

## 2017-07-26 RX ADMIN — ATORVASTATIN CALCIUM 20 MG: 20 TABLET, FILM COATED ORAL at 17:08

## 2017-07-27 LAB
ANION GAP SERPL CALCULATED.3IONS-SCNC: 4 MMOL/L (ref 4–13)
BUN SERPL-MCNC: 10 MG/DL (ref 5–25)
CALCIUM SERPL-MCNC: 9.6 MG/DL (ref 8.3–10.1)
CHLORIDE SERPL-SCNC: 103 MMOL/L (ref 100–108)
CO2 SERPL-SCNC: 30 MMOL/L (ref 21–32)
CREAT SERPL-MCNC: 0.86 MG/DL (ref 0.6–1.3)
ERYTHROCYTE [DISTWIDTH] IN BLOOD BY AUTOMATED COUNT: 13.3 % (ref 11.6–15.1)
GFR SERPL CREATININE-BSD FRML MDRD: 98 ML/MIN/1.73SQ M
GLUCOSE SERPL-MCNC: 191 MG/DL (ref 65–140)
HCT VFR BLD AUTO: 43.5 % (ref 36.5–49.3)
HGB BLD-MCNC: 14.4 G/DL (ref 12–17)
MCH RBC QN AUTO: 30.3 PG (ref 26.8–34.3)
MCHC RBC AUTO-ENTMCNC: 33.1 G/DL (ref 31.4–37.4)
MCV RBC AUTO: 91 FL (ref 82–98)
PLATELET # BLD AUTO: 273 THOUSANDS/UL (ref 149–390)
PMV BLD AUTO: 10.6 FL (ref 8.9–12.7)
POTASSIUM SERPL-SCNC: 4.2 MMOL/L (ref 3.5–5.3)
RBC # BLD AUTO: 4.76 MILLION/UL (ref 3.88–5.62)
SODIUM SERPL-SCNC: 137 MMOL/L (ref 136–145)
WBC # BLD AUTO: 6.46 THOUSAND/UL (ref 4.31–10.16)

## 2017-07-27 PROCEDURE — 80048 BASIC METABOLIC PNL TOTAL CA: CPT | Performed by: INTERNAL MEDICINE

## 2017-07-27 PROCEDURE — 85027 COMPLETE CBC AUTOMATED: CPT | Performed by: INTERNAL MEDICINE

## 2017-07-27 RX ADMIN — ASPIRIN 81 MG 81 MG: 81 TABLET ORAL at 08:17

## 2017-07-27 RX ADMIN — Medication 250 MG: at 08:16

## 2017-07-27 RX ADMIN — SODIUM CHLORIDE 4 MILLION UNITS: 9 INJECTION, SOLUTION INTRAVENOUS at 07:57

## 2017-07-27 RX ADMIN — SODIUM CHLORIDE 4 MILLION UNITS: 9 INJECTION, SOLUTION INTRAVENOUS at 23:56

## 2017-07-27 RX ADMIN — LISINOPRIL 20 MG: 20 TABLET ORAL at 08:16

## 2017-07-27 RX ADMIN — NICOTINE 1 PATCH: 7 PATCH, EXTENDED RELEASE TRANSDERMAL at 08:17

## 2017-07-27 RX ADMIN — ARIPIPRAZOLE 20 MG: 10 TABLET ORAL at 21:56

## 2017-07-27 RX ADMIN — SODIUM CHLORIDE 4 MILLION UNITS: 9 INJECTION, SOLUTION INTRAVENOUS at 11:39

## 2017-07-27 RX ADMIN — SODIUM CHLORIDE 4 MILLION UNITS: 9 INJECTION, SOLUTION INTRAVENOUS at 19:36

## 2017-07-27 RX ADMIN — ENOXAPARIN SODIUM 40 MG: 40 INJECTION SUBCUTANEOUS at 08:17

## 2017-07-27 RX ADMIN — SODIUM CHLORIDE 4 MILLION UNITS: 9 INJECTION, SOLUTION INTRAVENOUS at 03:40

## 2017-07-27 RX ADMIN — Medication 250 MG: at 17:46

## 2017-07-27 RX ADMIN — SODIUM CHLORIDE 4 MILLION UNITS: 9 INJECTION, SOLUTION INTRAVENOUS at 16:43

## 2017-07-27 RX ADMIN — ATORVASTATIN CALCIUM 20 MG: 20 TABLET, FILM COATED ORAL at 16:43

## 2017-07-27 RX ADMIN — HYDROCHLOROTHIAZIDE 50 MG: 25 TABLET ORAL at 08:16

## 2017-07-28 LAB
HCV GENTYP SERPL NAA+PROBE: NORMAL
HCV PLEASE NOTE: NORMAL

## 2017-07-28 RX ADMIN — ARIPIPRAZOLE 20 MG: 10 TABLET ORAL at 22:53

## 2017-07-28 RX ADMIN — NICOTINE 1 PATCH: 7 PATCH, EXTENDED RELEASE TRANSDERMAL at 08:28

## 2017-07-28 RX ADMIN — LISINOPRIL 20 MG: 20 TABLET ORAL at 08:26

## 2017-07-28 RX ADMIN — SODIUM CHLORIDE 4 MILLION UNITS: 9 INJECTION, SOLUTION INTRAVENOUS at 08:22

## 2017-07-28 RX ADMIN — Medication 250 MG: at 08:26

## 2017-07-28 RX ADMIN — SODIUM CHLORIDE 4 MILLION UNITS: 9 INJECTION, SOLUTION INTRAVENOUS at 19:58

## 2017-07-28 RX ADMIN — ATORVASTATIN CALCIUM 20 MG: 20 TABLET, FILM COATED ORAL at 17:26

## 2017-07-28 RX ADMIN — Medication 250 MG: at 17:26

## 2017-07-28 RX ADMIN — ASPIRIN 81 MG 81 MG: 81 TABLET ORAL at 08:26

## 2017-07-28 RX ADMIN — ENOXAPARIN SODIUM 40 MG: 40 INJECTION SUBCUTANEOUS at 08:26

## 2017-07-28 RX ADMIN — SODIUM CHLORIDE 4 MILLION UNITS: 9 INJECTION, SOLUTION INTRAVENOUS at 03:05

## 2017-07-28 RX ADMIN — SODIUM CHLORIDE 4 MILLION UNITS: 9 INJECTION, SOLUTION INTRAVENOUS at 12:20

## 2017-07-28 RX ADMIN — SODIUM CHLORIDE 4 MILLION UNITS: 9 INJECTION, SOLUTION INTRAVENOUS at 16:18

## 2017-07-28 RX ADMIN — HYDROCHLOROTHIAZIDE 50 MG: 25 TABLET ORAL at 08:26

## 2017-07-29 RX ADMIN — ATORVASTATIN CALCIUM 20 MG: 20 TABLET, FILM COATED ORAL at 17:35

## 2017-07-29 RX ADMIN — Medication 250 MG: at 09:34

## 2017-07-29 RX ADMIN — SODIUM CHLORIDE 4 MILLION UNITS: 9 INJECTION, SOLUTION INTRAVENOUS at 20:13

## 2017-07-29 RX ADMIN — SODIUM CHLORIDE 4 MILLION UNITS: 9 INJECTION, SOLUTION INTRAVENOUS at 00:05

## 2017-07-29 RX ADMIN — SODIUM CHLORIDE 4 MILLION UNITS: 9 INJECTION, SOLUTION INTRAVENOUS at 13:16

## 2017-07-29 RX ADMIN — ASPIRIN 81 MG 81 MG: 81 TABLET ORAL at 09:34

## 2017-07-29 RX ADMIN — NICOTINE 1 PATCH: 7 PATCH, EXTENDED RELEASE TRANSDERMAL at 09:35

## 2017-07-29 RX ADMIN — ARIPIPRAZOLE 20 MG: 10 TABLET ORAL at 21:41

## 2017-07-29 RX ADMIN — HYDROCHLOROTHIAZIDE 50 MG: 25 TABLET ORAL at 09:34

## 2017-07-29 RX ADMIN — SODIUM CHLORIDE 4 MILLION UNITS: 9 INJECTION, SOLUTION INTRAVENOUS at 05:05

## 2017-07-29 RX ADMIN — ENOXAPARIN SODIUM 40 MG: 40 INJECTION SUBCUTANEOUS at 09:34

## 2017-07-29 RX ADMIN — Medication 250 MG: at 17:35

## 2017-07-29 RX ADMIN — SODIUM CHLORIDE 4 MILLION UNITS: 9 INJECTION, SOLUTION INTRAVENOUS at 16:21

## 2017-07-29 RX ADMIN — LISINOPRIL 20 MG: 20 TABLET ORAL at 09:34

## 2017-07-29 RX ADMIN — SODIUM CHLORIDE 4 MILLION UNITS: 9 INJECTION, SOLUTION INTRAVENOUS at 09:34

## 2017-07-30 RX ADMIN — HYDROCHLOROTHIAZIDE 50 MG: 25 TABLET ORAL at 08:29

## 2017-07-30 RX ADMIN — Medication 250 MG: at 17:05

## 2017-07-30 RX ADMIN — SODIUM CHLORIDE 4 MILLION UNITS: 9 INJECTION, SOLUTION INTRAVENOUS at 00:27

## 2017-07-30 RX ADMIN — ARIPIPRAZOLE 20 MG: 10 TABLET ORAL at 21:19

## 2017-07-30 RX ADMIN — SODIUM CHLORIDE 4 MILLION UNITS: 9 INJECTION, SOLUTION INTRAVENOUS at 08:29

## 2017-07-30 RX ADMIN — Medication 250 MG: at 08:28

## 2017-07-30 RX ADMIN — NICOTINE 1 PATCH: 7 PATCH, EXTENDED RELEASE TRANSDERMAL at 08:29

## 2017-07-30 RX ADMIN — ATORVASTATIN CALCIUM 20 MG: 20 TABLET, FILM COATED ORAL at 17:05

## 2017-07-30 RX ADMIN — SODIUM CHLORIDE 4 MILLION UNITS: 9 INJECTION, SOLUTION INTRAVENOUS at 04:09

## 2017-07-30 RX ADMIN — HYDRALAZINE HYDROCHLORIDE 5 MG: 20 INJECTION INTRAMUSCULAR; INTRAVENOUS at 10:17

## 2017-07-30 RX ADMIN — ENOXAPARIN SODIUM 40 MG: 40 INJECTION SUBCUTANEOUS at 08:28

## 2017-07-30 RX ADMIN — ASPIRIN 81 MG 81 MG: 81 TABLET ORAL at 08:28

## 2017-07-30 RX ADMIN — LISINOPRIL 20 MG: 20 TABLET ORAL at 08:29

## 2017-07-30 RX ADMIN — SODIUM CHLORIDE 4 MILLION UNITS: 9 INJECTION, SOLUTION INTRAVENOUS at 12:24

## 2017-07-31 RX ORDER — LISINOPRIL 20 MG/1
40 TABLET ORAL DAILY
Status: DISCONTINUED | OUTPATIENT
Start: 2017-07-31 | End: 2017-08-03 | Stop reason: HOSPADM

## 2017-07-31 RX ADMIN — HYDROCHLOROTHIAZIDE 50 MG: 25 TABLET ORAL at 09:05

## 2017-07-31 RX ADMIN — LISINOPRIL 40 MG: 20 TABLET ORAL at 09:06

## 2017-07-31 RX ADMIN — ASPIRIN 81 MG 81 MG: 81 TABLET ORAL at 09:09

## 2017-07-31 RX ADMIN — Medication 250 MG: at 09:08

## 2017-07-31 RX ADMIN — ARIPIPRAZOLE 20 MG: 10 TABLET ORAL at 21:16

## 2017-07-31 RX ADMIN — ATORVASTATIN CALCIUM 20 MG: 20 TABLET, FILM COATED ORAL at 17:23

## 2017-07-31 RX ADMIN — NICOTINE 1 PATCH: 7 PATCH, EXTENDED RELEASE TRANSDERMAL at 09:08

## 2017-07-31 RX ADMIN — Medication 250 MG: at 17:23

## 2017-07-31 RX ADMIN — ACETAMINOPHEN 650 MG: 325 TABLET, FILM COATED ORAL at 19:29

## 2017-07-31 RX ADMIN — ENOXAPARIN SODIUM 40 MG: 40 INJECTION SUBCUTANEOUS at 09:06

## 2017-08-01 RX ADMIN — LISINOPRIL 40 MG: 20 TABLET ORAL at 09:06

## 2017-08-01 RX ADMIN — ATORVASTATIN CALCIUM 20 MG: 20 TABLET, FILM COATED ORAL at 16:05

## 2017-08-01 RX ADMIN — ASPIRIN 81 MG 81 MG: 81 TABLET ORAL at 09:06

## 2017-08-01 RX ADMIN — ARIPIPRAZOLE 20 MG: 10 TABLET ORAL at 21:53

## 2017-08-01 RX ADMIN — Medication 250 MG: at 09:06

## 2017-08-01 RX ADMIN — HYDROCHLOROTHIAZIDE 50 MG: 25 TABLET ORAL at 09:06

## 2017-08-01 RX ADMIN — Medication 250 MG: at 17:58

## 2017-08-01 RX ADMIN — ENOXAPARIN SODIUM 40 MG: 40 INJECTION SUBCUTANEOUS at 09:06

## 2017-08-01 RX ADMIN — NICOTINE 1 PATCH: 7 PATCH, EXTENDED RELEASE TRANSDERMAL at 09:07

## 2017-08-02 RX ADMIN — ATORVASTATIN CALCIUM 20 MG: 20 TABLET, FILM COATED ORAL at 17:42

## 2017-08-02 RX ADMIN — LISINOPRIL 40 MG: 20 TABLET ORAL at 09:08

## 2017-08-02 RX ADMIN — SODIUM CHLORIDE 4 MILLION UNITS: 9 INJECTION, SOLUTION INTRAVENOUS at 17:42

## 2017-08-02 RX ADMIN — ENOXAPARIN SODIUM 40 MG: 40 INJECTION SUBCUTANEOUS at 09:07

## 2017-08-02 RX ADMIN — ASPIRIN 81 MG 81 MG: 81 TABLET ORAL at 09:08

## 2017-08-02 RX ADMIN — NICOTINE 1 PATCH: 7 PATCH, EXTENDED RELEASE TRANSDERMAL at 09:08

## 2017-08-02 RX ADMIN — Medication 250 MG: at 17:42

## 2017-08-02 RX ADMIN — Medication 250 MG: at 09:07

## 2017-08-02 RX ADMIN — HYDROCHLOROTHIAZIDE 50 MG: 25 TABLET ORAL at 09:08

## 2017-08-02 RX ADMIN — ARIPIPRAZOLE 20 MG: 10 TABLET ORAL at 21:40

## 2017-08-02 RX ADMIN — SODIUM CHLORIDE 4 MILLION UNITS: 9 INJECTION, SOLUTION INTRAVENOUS at 21:40

## 2017-08-03 ENCOUNTER — HOSPITAL ENCOUNTER (INPATIENT)
Facility: HOSPITAL | Age: 57
LOS: 4 days | Discharge: HOME | DRG: 751 | End: 2017-08-07
Attending: PSYCHIATRY & NEUROLOGY | Admitting: PSYCHIATRY & NEUROLOGY
Payer: COMMERCIAL

## 2017-08-03 VITALS
SYSTOLIC BLOOD PRESSURE: 148 MMHG | TEMPERATURE: 98.3 F | OXYGEN SATURATION: 99 % | HEIGHT: 74 IN | RESPIRATION RATE: 18 BRPM | DIASTOLIC BLOOD PRESSURE: 78 MMHG | HEART RATE: 84 BPM | BODY MASS INDEX: 22.2 KG/M2 | WEIGHT: 173 LBS

## 2017-08-03 DIAGNOSIS — F29 ATYPICAL PSYCHOSIS (HCC): Primary | Chronic | ICD-10-CM

## 2017-08-03 LAB
ANION GAP SERPL CALCULATED.3IONS-SCNC: 6 MMOL/L (ref 4–13)
BASOPHILS # BLD AUTO: 0.04 THOUSANDS/ΜL (ref 0–0.1)
BASOPHILS NFR BLD AUTO: 1 % (ref 0–1)
BUN SERPL-MCNC: 21 MG/DL (ref 5–25)
CALCIUM SERPL-MCNC: 8.9 MG/DL (ref 8.3–10.1)
CHLORIDE SERPL-SCNC: 102 MMOL/L (ref 100–108)
CO2 SERPL-SCNC: 29 MMOL/L (ref 21–32)
CREAT SERPL-MCNC: 0.94 MG/DL (ref 0.6–1.3)
EOSINOPHIL # BLD AUTO: 0.19 THOUSAND/ΜL (ref 0–0.61)
EOSINOPHIL NFR BLD AUTO: 3 % (ref 0–6)
ERYTHROCYTE [DISTWIDTH] IN BLOOD BY AUTOMATED COUNT: 13.2 % (ref 11.6–15.1)
GFR SERPL CREATININE-BSD FRML MDRD: 91 ML/MIN/1.73SQ M
GLUCOSE SERPL-MCNC: 184 MG/DL (ref 65–140)
HCT VFR BLD AUTO: 42.3 % (ref 36.5–49.3)
HGB BLD-MCNC: 14.5 G/DL (ref 12–17)
LYMPHOCYTES # BLD AUTO: 3.09 THOUSANDS/ΜL (ref 0.6–4.47)
LYMPHOCYTES NFR BLD AUTO: 45 % (ref 14–44)
MCH RBC QN AUTO: 30.8 PG (ref 26.8–34.3)
MCHC RBC AUTO-ENTMCNC: 34.3 G/DL (ref 31.4–37.4)
MCV RBC AUTO: 90 FL (ref 82–98)
MONOCYTES # BLD AUTO: 0.59 THOUSAND/ΜL (ref 0.17–1.22)
MONOCYTES NFR BLD AUTO: 9 % (ref 4–12)
NEUTROPHILS # BLD AUTO: 2.86 THOUSANDS/ΜL (ref 1.85–7.62)
NEUTS SEG NFR BLD AUTO: 42 % (ref 43–75)
NRBC BLD AUTO-RTO: 0 /100 WBCS
PLATELET # BLD AUTO: 253 THOUSANDS/UL (ref 149–390)
PMV BLD AUTO: 10.3 FL (ref 8.9–12.7)
POTASSIUM SERPL-SCNC: 3.9 MMOL/L (ref 3.5–5.3)
RBC # BLD AUTO: 4.71 MILLION/UL (ref 3.88–5.62)
SODIUM SERPL-SCNC: 137 MMOL/L (ref 136–145)
WBC # BLD AUTO: 6.79 THOUSAND/UL (ref 4.31–10.16)

## 2017-08-03 PROCEDURE — 80048 BASIC METABOLIC PNL TOTAL CA: CPT | Performed by: INTERNAL MEDICINE

## 2017-08-03 PROCEDURE — 85025 COMPLETE CBC W/AUTO DIFF WBC: CPT | Performed by: INTERNAL MEDICINE

## 2017-08-03 RX ORDER — RISPERIDONE 1 MG/1
1 TABLET, ORALLY DISINTEGRATING ORAL EVERY 6 HOURS PRN
Status: DISCONTINUED | OUTPATIENT
Start: 2017-08-03 | End: 2017-08-07 | Stop reason: HOSPADM

## 2017-08-03 RX ORDER — BENZTROPINE MESYLATE 1 MG/ML
1 INJECTION INTRAMUSCULAR; INTRAVENOUS EVERY 8 HOURS PRN
Status: DISCONTINUED | OUTPATIENT
Start: 2017-08-03 | End: 2017-08-07 | Stop reason: HOSPADM

## 2017-08-03 RX ORDER — TRAMADOL HYDROCHLORIDE 50 MG/1
50 TABLET ORAL EVERY 6 HOURS PRN
Status: DISCONTINUED | OUTPATIENT
Start: 2017-08-03 | End: 2017-08-03

## 2017-08-03 RX ORDER — LORAZEPAM 1 MG/1
1 TABLET ORAL EVERY 4 HOURS PRN
Status: CANCELLED | OUTPATIENT
Start: 2017-08-03

## 2017-08-03 RX ORDER — HALOPERIDOL 5 MG
10 TABLET ORAL EVERY 6 HOURS PRN
Status: DISCONTINUED | OUTPATIENT
Start: 2017-08-03 | End: 2017-08-03

## 2017-08-03 RX ORDER — OLANZAPINE 10 MG/1
10 TABLET ORAL EVERY 8 HOURS PRN
Status: DISCONTINUED | OUTPATIENT
Start: 2017-08-03 | End: 2017-08-07 | Stop reason: HOSPADM

## 2017-08-03 RX ORDER — OLANZAPINE 10 MG/1
5 INJECTION, POWDER, LYOPHILIZED, FOR SOLUTION INTRAMUSCULAR EVERY 8 HOURS PRN
Status: DISCONTINUED | OUTPATIENT
Start: 2017-08-03 | End: 2017-08-07 | Stop reason: HOSPADM

## 2017-08-03 RX ORDER — HALOPERIDOL 2 MG/1
2 TABLET ORAL EVERY 6 HOURS PRN
Status: DISCONTINUED | OUTPATIENT
Start: 2017-08-03 | End: 2017-08-03

## 2017-08-03 RX ORDER — HALOPERIDOL 5 MG
5 TABLET ORAL EVERY 6 HOURS PRN
Status: DISCONTINUED | OUTPATIENT
Start: 2017-08-03 | End: 2017-08-07 | Stop reason: HOSPADM

## 2017-08-03 RX ORDER — IBUPROFEN 400 MG/1
400 TABLET ORAL EVERY 8 HOURS PRN
Status: DISCONTINUED | OUTPATIENT
Start: 2017-08-03 | End: 2017-08-03

## 2017-08-03 RX ORDER — ACETAMINOPHEN 325 MG/1
650 TABLET ORAL EVERY 6 HOURS PRN
Status: DISCONTINUED | OUTPATIENT
Start: 2017-08-03 | End: 2017-08-07 | Stop reason: HOSPADM

## 2017-08-03 RX ORDER — ASPIRIN 81 MG/1
81 TABLET, CHEWABLE ORAL DAILY
Status: CANCELLED | OUTPATIENT
Start: 2017-08-04

## 2017-08-03 RX ORDER — HALOPERIDOL 5 MG/ML
5 INJECTION INTRAMUSCULAR EVERY 6 HOURS PRN
Status: DISCONTINUED | OUTPATIENT
Start: 2017-08-03 | End: 2017-08-03

## 2017-08-03 RX ORDER — ACETAMINOPHEN 325 MG/1
975 TABLET ORAL EVERY 6 HOURS PRN
Status: DISCONTINUED | OUTPATIENT
Start: 2017-08-03 | End: 2017-08-07 | Stop reason: HOSPADM

## 2017-08-03 RX ORDER — TRAZODONE HYDROCHLORIDE 50 MG/1
25 TABLET ORAL
Status: DISCONTINUED | OUTPATIENT
Start: 2017-08-03 | End: 2017-08-07 | Stop reason: HOSPADM

## 2017-08-03 RX ORDER — ACETAMINOPHEN 325 MG/1
650 TABLET ORAL EVERY 4 HOURS PRN
Status: DISCONTINUED | OUTPATIENT
Start: 2017-08-03 | End: 2017-08-07 | Stop reason: HOSPADM

## 2017-08-03 RX ORDER — ACETAMINOPHEN 325 MG/1
650 TABLET ORAL EVERY 6 HOURS PRN
Status: DISCONTINUED | OUTPATIENT
Start: 2017-08-03 | End: 2017-08-03

## 2017-08-03 RX ORDER — HALOPERIDOL 5 MG/ML
5 INJECTION INTRAMUSCULAR EVERY 6 HOURS PRN
Status: DISCONTINUED | OUTPATIENT
Start: 2017-08-03 | End: 2017-08-07 | Stop reason: HOSPADM

## 2017-08-03 RX ORDER — OLANZAPINE 5 MG/1
5 TABLET ORAL EVERY 8 HOURS PRN
Status: DISCONTINUED | OUTPATIENT
Start: 2017-08-03 | End: 2017-08-03

## 2017-08-03 RX ORDER — ARIPIPRAZOLE 10 MG/1
20 TABLET ORAL
Status: CANCELLED | OUTPATIENT
Start: 2017-08-03

## 2017-08-03 RX ORDER — MAGNESIUM HYDROXIDE/ALUMINUM HYDROXICE/SIMETHICONE 120; 1200; 1200 MG/30ML; MG/30ML; MG/30ML
30 SUSPENSION ORAL EVERY 4 HOURS PRN
Status: DISCONTINUED | OUTPATIENT
Start: 2017-08-03 | End: 2017-08-07 | Stop reason: HOSPADM

## 2017-08-03 RX ORDER — OLANZAPINE 10 MG/1
5 INJECTION, POWDER, LYOPHILIZED, FOR SOLUTION INTRAMUSCULAR EVERY 8 HOURS PRN
Status: DISCONTINUED | OUTPATIENT
Start: 2017-08-03 | End: 2017-08-03

## 2017-08-03 RX ORDER — LORAZEPAM 1 MG/1
2 TABLET ORAL EVERY 4 HOURS PRN
Status: DISCONTINUED | OUTPATIENT
Start: 2017-08-03 | End: 2017-08-07 | Stop reason: HOSPADM

## 2017-08-03 RX ORDER — BENZTROPINE MESYLATE 1 MG/1
1 TABLET ORAL EVERY 8 HOURS PRN
Status: DISCONTINUED | OUTPATIENT
Start: 2017-08-03 | End: 2017-08-07 | Stop reason: HOSPADM

## 2017-08-03 RX ORDER — LISINOPRIL 20 MG/1
40 TABLET ORAL DAILY
Status: CANCELLED | OUTPATIENT
Start: 2017-08-04

## 2017-08-03 RX ORDER — HALOPERIDOL 5 MG/ML
2 INJECTION INTRAMUSCULAR EVERY 6 HOURS PRN
Status: DISCONTINUED | OUTPATIENT
Start: 2017-08-03 | End: 2017-08-03

## 2017-08-03 RX ORDER — RISPERIDONE 1 MG/1
1 TABLET, ORALLY DISINTEGRATING ORAL EVERY 8 HOURS PRN
Status: DISCONTINUED | OUTPATIENT
Start: 2017-08-03 | End: 2017-08-03

## 2017-08-03 RX ORDER — LORAZEPAM 2 MG/ML
2 INJECTION INTRAMUSCULAR EVERY 4 HOURS PRN
Status: DISCONTINUED | OUTPATIENT
Start: 2017-08-03 | End: 2017-08-07 | Stop reason: HOSPADM

## 2017-08-03 RX ORDER — ACETAMINOPHEN 325 MG/1
650 TABLET ORAL EVERY 6 HOURS PRN
Status: CANCELLED | OUTPATIENT
Start: 2017-08-03

## 2017-08-03 RX ORDER — OLANZAPINE 10 MG/1
10 TABLET ORAL EVERY 8 HOURS PRN
Status: DISCONTINUED | OUTPATIENT
Start: 2017-08-03 | End: 2017-08-03

## 2017-08-03 RX ORDER — HYDROCHLOROTHIAZIDE 25 MG/1
50 TABLET ORAL DAILY
Status: CANCELLED | OUTPATIENT
Start: 2017-08-04

## 2017-08-03 RX ORDER — ATORVASTATIN CALCIUM 20 MG/1
20 TABLET, FILM COATED ORAL
Status: CANCELLED | OUTPATIENT
Start: 2017-08-03

## 2017-08-03 RX ADMIN — LISINOPRIL 40 MG: 20 TABLET ORAL at 09:22

## 2017-08-03 RX ADMIN — ASPIRIN 81 MG 81 MG: 81 TABLET ORAL at 09:23

## 2017-08-03 RX ADMIN — ENOXAPARIN SODIUM 40 MG: 40 INJECTION SUBCUTANEOUS at 09:23

## 2017-08-03 RX ADMIN — SODIUM CHLORIDE 4 MILLION UNITS: 9 INJECTION, SOLUTION INTRAVENOUS at 09:23

## 2017-08-03 RX ADMIN — HYDROCHLOROTHIAZIDE 50 MG: 25 TABLET ORAL at 09:23

## 2017-08-03 RX ADMIN — SODIUM CHLORIDE 4 MILLION UNITS: 9 INJECTION, SOLUTION INTRAVENOUS at 04:47

## 2017-08-03 RX ADMIN — NICOTINE 1 PATCH: 7 PATCH, EXTENDED RELEASE TRANSDERMAL at 09:22

## 2017-08-03 RX ADMIN — SODIUM CHLORIDE 4 MILLION UNITS: 9 INJECTION, SOLUTION INTRAVENOUS at 01:17

## 2017-08-03 RX ADMIN — Medication 250 MG: at 09:22

## 2017-08-04 PROBLEM — F29 ATYPICAL PSYCHOSIS (HCC): Chronic | Status: ACTIVE | Noted: 2017-08-04

## 2017-08-04 RX ORDER — ARIPIPRAZOLE 15 MG/1
15 TABLET ORAL DAILY
Status: DISCONTINUED | OUTPATIENT
Start: 2017-08-04 | End: 2017-08-07 | Stop reason: HOSPADM

## 2017-08-04 RX ADMIN — ARIPIPRAZOLE 15 MG: 15 TABLET ORAL at 11:10

## 2017-08-05 RX ADMIN — ARIPIPRAZOLE 15 MG: 15 TABLET ORAL at 08:06

## 2017-08-06 RX ADMIN — ARIPIPRAZOLE 15 MG: 15 TABLET ORAL at 08:14

## 2017-08-07 VITALS
SYSTOLIC BLOOD PRESSURE: 143 MMHG | DIASTOLIC BLOOD PRESSURE: 93 MMHG | HEIGHT: 68 IN | WEIGHT: 167 LBS | HEART RATE: 78 BPM | BODY MASS INDEX: 25.31 KG/M2 | OXYGEN SATURATION: 99 % | TEMPERATURE: 97.8 F | RESPIRATION RATE: 16 BRPM

## 2017-08-07 RX ORDER — ARIPIPRAZOLE 15 MG/1
15 TABLET ORAL DAILY
Qty: 30 TABLET | Refills: 0 | Status: SHIPPED | OUTPATIENT
Start: 2017-08-07 | End: 2017-10-19 | Stop reason: HOSPADM

## 2017-08-07 RX ADMIN — ARIPIPRAZOLE 15 MG: 15 TABLET ORAL at 08:16

## 2017-09-18 ENCOUNTER — APPOINTMENT (EMERGENCY)
Dept: RADIOLOGY | Facility: HOSPITAL | Age: 57
End: 2017-09-18
Payer: COMMERCIAL

## 2017-09-18 ENCOUNTER — APPOINTMENT (EMERGENCY)
Dept: CT IMAGING | Facility: HOSPITAL | Age: 57
End: 2017-09-18
Payer: COMMERCIAL

## 2017-09-18 ENCOUNTER — HOSPITAL ENCOUNTER (EMERGENCY)
Facility: HOSPITAL | Age: 57
End: 2017-09-18
Attending: EMERGENCY MEDICINE | Admitting: EMERGENCY MEDICINE
Payer: COMMERCIAL

## 2017-09-18 ENCOUNTER — HOSPITAL ENCOUNTER (INPATIENT)
Facility: HOSPITAL | Age: 57
LOS: 31 days | Discharge: HOME | DRG: 750 | End: 2017-10-19
Attending: PSYCHIATRY & NEUROLOGY | Admitting: PSYCHIATRY & NEUROLOGY
Payer: COMMERCIAL

## 2017-09-18 VITALS
RESPIRATION RATE: 18 BRPM | BODY MASS INDEX: 24.86 KG/M2 | HEIGHT: 68 IN | SYSTOLIC BLOOD PRESSURE: 151 MMHG | OXYGEN SATURATION: 99 % | WEIGHT: 164.02 LBS | DIASTOLIC BLOOD PRESSURE: 99 MMHG | HEART RATE: 65 BPM | TEMPERATURE: 97.9 F

## 2017-09-18 DIAGNOSIS — F17.200 TOBACCO DEPENDENCE: Chronic | ICD-10-CM

## 2017-09-18 DIAGNOSIS — R74.8 ELEVATED LIVER ENZYMES: ICD-10-CM

## 2017-09-18 DIAGNOSIS — R73.03 PREDIABETES: Chronic | ICD-10-CM

## 2017-09-18 DIAGNOSIS — A52.3 NEUROSYPHILIS IN ADULT: ICD-10-CM

## 2017-09-18 DIAGNOSIS — F20.9 SCHIZOPHRENIA (HCC): Primary | ICD-10-CM

## 2017-09-18 DIAGNOSIS — E87.1 HYPONATREMIA: ICD-10-CM

## 2017-09-18 DIAGNOSIS — E11.9 TYPE 2 DIABETES MELLITUS (HCC): Chronic | ICD-10-CM

## 2017-09-18 DIAGNOSIS — F20.0 PARANOID SCHIZOPHRENIA (HCC): Primary | ICD-10-CM

## 2017-09-18 DIAGNOSIS — I10 BENIGN ESSENTIAL HTN: Chronic | ICD-10-CM

## 2017-09-18 LAB
ALBUMIN SERPL BCP-MCNC: 3.6 G/DL (ref 3.5–5)
ALP SERPL-CCNC: 49 U/L (ref 46–116)
ALT SERPL W P-5'-P-CCNC: 129 U/L (ref 12–78)
AMMONIA PLAS-SCNC: 23 UMOL/L (ref 11–35)
AMPHETAMINES SERPL QL SCN: NEGATIVE
ANION GAP SERPL CALCULATED.3IONS-SCNC: 6 MMOL/L (ref 4–13)
APTT PPP: 26 SECONDS (ref 23–35)
AST SERPL W P-5'-P-CCNC: 86 U/L (ref 5–45)
ATRIAL RATE: 44 BPM
ATRIAL RATE: 44 BPM
ATRIAL RATE: 50 BPM
BACTERIA UR QL AUTO: ABNORMAL /HPF
BARBITURATES UR QL: NEGATIVE
BASOPHILS # BLD AUTO: 0.02 THOUSANDS/ΜL (ref 0–0.1)
BASOPHILS NFR BLD AUTO: 0 % (ref 0–1)
BENZODIAZ UR QL: NEGATIVE
BILIRUB SERPL-MCNC: 0.39 MG/DL (ref 0.2–1)
BILIRUB UR QL STRIP: ABNORMAL
BUN SERPL-MCNC: 21 MG/DL (ref 5–25)
CALCIUM SERPL-MCNC: 8.8 MG/DL (ref 8.3–10.1)
CHLORIDE SERPL-SCNC: 109 MMOL/L (ref 100–108)
CLARITY UR: CLEAR
CO2 SERPL-SCNC: 27 MMOL/L (ref 21–32)
COCAINE UR QL: NEGATIVE
COLOR UR: YELLOW
CREAT SERPL-MCNC: 0.75 MG/DL (ref 0.6–1.3)
EOSINOPHIL # BLD AUTO: 0.08 THOUSAND/ΜL (ref 0–0.61)
EOSINOPHIL NFR BLD AUTO: 1 % (ref 0–6)
ERYTHROCYTE [DISTWIDTH] IN BLOOD BY AUTOMATED COUNT: 14 % (ref 11.6–15.1)
ETHANOL SERPL-MCNC: <3 MG/DL (ref 0–3)
GFR SERPL CREATININE-BSD FRML MDRD: 103 ML/MIN/1.73SQ M
GLUCOSE SERPL-MCNC: 90 MG/DL (ref 65–140)
GLUCOSE UR STRIP-MCNC: NEGATIVE MG/DL
HCT VFR BLD AUTO: 40 % (ref 36.5–49.3)
HGB BLD-MCNC: 13.6 G/DL (ref 12–17)
HGB UR QL STRIP.AUTO: NEGATIVE
INR PPP: 1.02 (ref 0.86–1.16)
KETONES UR STRIP-MCNC: NEGATIVE MG/DL
LEUKOCYTE ESTERASE UR QL STRIP: NEGATIVE
LYMPHOCYTES # BLD AUTO: 3.04 THOUSANDS/ΜL (ref 0.6–4.47)
LYMPHOCYTES NFR BLD AUTO: 37 % (ref 14–44)
MCH RBC QN AUTO: 30.9 PG (ref 26.8–34.3)
MCHC RBC AUTO-ENTMCNC: 34 G/DL (ref 31.4–37.4)
MCV RBC AUTO: 91 FL (ref 82–98)
METHADONE UR QL: NEGATIVE
MONOCYTES # BLD AUTO: 0.55 THOUSAND/ΜL (ref 0.17–1.22)
MONOCYTES NFR BLD AUTO: 7 % (ref 4–12)
NEUTROPHILS # BLD AUTO: 4.47 THOUSANDS/ΜL (ref 1.85–7.62)
NEUTS SEG NFR BLD AUTO: 55 % (ref 43–75)
NITRITE UR QL STRIP: NEGATIVE
NON-SQ EPI CELLS URNS QL MICRO: ABNORMAL /HPF
NRBC BLD AUTO-RTO: 0 /100 WBCS
OPIATES UR QL SCN: NEGATIVE
OTHER STN SPEC: ABNORMAL
P AXIS: 43 DEGREES
P AXIS: 44 DEGREES
P AXIS: 52 DEGREES
PCP UR QL: NEGATIVE
PH UR STRIP.AUTO: 5 [PH] (ref 4.5–8)
PLATELET # BLD AUTO: 237 THOUSANDS/UL (ref 149–390)
PMV BLD AUTO: 10.3 FL (ref 8.9–12.7)
POTASSIUM SERPL-SCNC: 3.6 MMOL/L (ref 3.5–5.3)
PR INTERVAL: 196 MS
PR INTERVAL: 210 MS
PR INTERVAL: 212 MS
PROT SERPL-MCNC: 6.8 G/DL (ref 6.4–8.2)
PROT UR STRIP-MCNC: ABNORMAL MG/DL
PROTHROMBIN TIME: 13.4 SECONDS (ref 12.1–14.4)
QRS AXIS: 21 DEGREES
QRS AXIS: 41 DEGREES
QRS AXIS: 43 DEGREES
QRSD INTERVAL: 84 MS
QRSD INTERVAL: 86 MS
QRSD INTERVAL: 90 MS
QT INTERVAL: 460 MS
QT INTERVAL: 494 MS
QT INTERVAL: 498 MS
QTC INTERVAL: 419 MS
QTC INTERVAL: 422 MS
QTC INTERVAL: 425 MS
RBC # BLD AUTO: 4.4 MILLION/UL (ref 3.88–5.62)
RBC #/AREA URNS AUTO: ABNORMAL /HPF
SODIUM SERPL-SCNC: 142 MMOL/L (ref 136–145)
SP GR UR STRIP.AUTO: >=1.03 (ref 1–1.03)
SPECIMEN SOURCE: NORMAL
SPECIMEN SOURCE: NORMAL
T WAVE AXIS: 123 DEGREES
T WAVE AXIS: 127 DEGREES
T WAVE AXIS: 65 DEGREES
THC UR QL: NEGATIVE
TROPONIN I BLD-MCNC: 0.04 NG/ML (ref 0–0.08)
TROPONIN I BLD-MCNC: 0.04 NG/ML (ref 0–0.08)
UROBILINOGEN UR QL STRIP.AUTO: 1 E.U./DL
VENTRICULAR RATE: 44 BPM
VENTRICULAR RATE: 44 BPM
VENTRICULAR RATE: 50 BPM
WBC # BLD AUTO: 8.16 THOUSAND/UL (ref 4.31–10.16)
WBC #/AREA URNS AUTO: ABNORMAL /HPF

## 2017-09-18 PROCEDURE — 71010 HB CHEST X-RAY 1 VIEW FRONTAL (PORTABLE): CPT

## 2017-09-18 PROCEDURE — 93005 ELECTROCARDIOGRAM TRACING: CPT

## 2017-09-18 PROCEDURE — 70450 CT HEAD/BRAIN W/O DYE: CPT

## 2017-09-18 PROCEDURE — 84484 ASSAY OF TROPONIN QUANT: CPT

## 2017-09-18 PROCEDURE — 99285 EMERGENCY DEPT VISIT HI MDM: CPT

## 2017-09-18 PROCEDURE — 96372 THER/PROPH/DIAG INJ SC/IM: CPT

## 2017-09-18 PROCEDURE — 80307 DRUG TEST PRSMV CHEM ANLYZR: CPT | Performed by: EMERGENCY MEDICINE

## 2017-09-18 PROCEDURE — 80053 COMPREHEN METABOLIC PANEL: CPT | Performed by: EMERGENCY MEDICINE

## 2017-09-18 PROCEDURE — 81002 URINALYSIS NONAUTO W/O SCOPE: CPT | Performed by: EMERGENCY MEDICINE

## 2017-09-18 PROCEDURE — 85730 THROMBOPLASTIN TIME PARTIAL: CPT | Performed by: EMERGENCY MEDICINE

## 2017-09-18 PROCEDURE — 36415 COLL VENOUS BLD VENIPUNCTURE: CPT | Performed by: EMERGENCY MEDICINE

## 2017-09-18 PROCEDURE — 85610 PROTHROMBIN TIME: CPT | Performed by: EMERGENCY MEDICINE

## 2017-09-18 PROCEDURE — 82140 ASSAY OF AMMONIA: CPT | Performed by: EMERGENCY MEDICINE

## 2017-09-18 PROCEDURE — 85025 COMPLETE CBC W/AUTO DIFF WBC: CPT | Performed by: EMERGENCY MEDICINE

## 2017-09-18 PROCEDURE — 80320 DRUG SCREEN QUANTALCOHOLS: CPT | Performed by: EMERGENCY MEDICINE

## 2017-09-18 PROCEDURE — 81001 URINALYSIS AUTO W/SCOPE: CPT

## 2017-09-18 PROCEDURE — 96360 HYDRATION IV INFUSION INIT: CPT

## 2017-09-18 PROCEDURE — 93005 ELECTROCARDIOGRAM TRACING: CPT | Performed by: EMERGENCY MEDICINE

## 2017-09-18 RX ORDER — OLANZAPINE 5 MG/1
10 TABLET, ORALLY DISINTEGRATING ORAL
Status: DISCONTINUED | OUTPATIENT
Start: 2017-09-18 | End: 2017-09-18 | Stop reason: HOSPADM

## 2017-09-18 RX ORDER — LORAZEPAM 2 MG/ML
1 INJECTION INTRAMUSCULAR EVERY 6 HOURS PRN
Status: DISCONTINUED | OUTPATIENT
Start: 2017-09-18 | End: 2017-10-19 | Stop reason: HOSPADM

## 2017-09-18 RX ORDER — MAGNESIUM HYDROXIDE/ALUMINUM HYDROXICE/SIMETHICONE 120; 1200; 1200 MG/30ML; MG/30ML; MG/30ML
30 SUSPENSION ORAL EVERY 4 HOURS PRN
Status: DISCONTINUED | OUTPATIENT
Start: 2017-09-18 | End: 2017-10-19 | Stop reason: HOSPADM

## 2017-09-18 RX ORDER — LORAZEPAM 1 MG/1
2 TABLET ORAL ONCE
Status: COMPLETED | OUTPATIENT
Start: 2017-09-18 | End: 2017-09-18

## 2017-09-18 RX ORDER — ZIPRASIDONE MESYLATE 20 MG/ML
20 INJECTION, POWDER, LYOPHILIZED, FOR SOLUTION INTRAMUSCULAR ONCE
Status: COMPLETED | OUTPATIENT
Start: 2017-09-18 | End: 2017-09-18

## 2017-09-18 RX ORDER — HALOPERIDOL 5 MG/ML
5 INJECTION INTRAMUSCULAR EVERY 6 HOURS PRN
Status: DISCONTINUED | OUTPATIENT
Start: 2017-09-18 | End: 2017-10-19 | Stop reason: HOSPADM

## 2017-09-18 RX ORDER — LORAZEPAM 1 MG/1
1 TABLET ORAL EVERY 6 HOURS PRN
Status: DISCONTINUED | OUTPATIENT
Start: 2017-09-18 | End: 2017-10-19 | Stop reason: HOSPADM

## 2017-09-18 RX ORDER — HALOPERIDOL 5 MG
5 TABLET ORAL EVERY 6 HOURS PRN
Status: DISCONTINUED | OUTPATIENT
Start: 2017-09-18 | End: 2017-10-19 | Stop reason: HOSPADM

## 2017-09-18 RX ORDER — NICOTINE 21 MG/24HR
1 PATCH, TRANSDERMAL 24 HOURS TRANSDERMAL DAILY
Status: DISCONTINUED | OUTPATIENT
Start: 2017-09-19 | End: 2017-10-19 | Stop reason: HOSPADM

## 2017-09-18 RX ORDER — ACETAMINOPHEN 325 MG/1
650 TABLET ORAL EVERY 6 HOURS PRN
Status: DISCONTINUED | OUTPATIENT
Start: 2017-09-18 | End: 2017-09-18 | Stop reason: SDUPTHER

## 2017-09-18 RX ORDER — BENZTROPINE MESYLATE 1 MG/ML
1 INJECTION INTRAMUSCULAR; INTRAVENOUS EVERY 6 HOURS PRN
Status: DISCONTINUED | OUTPATIENT
Start: 2017-09-18 | End: 2017-10-19 | Stop reason: HOSPADM

## 2017-09-18 RX ORDER — IBUPROFEN 600 MG/1
600 TABLET ORAL EVERY 8 HOURS PRN
Status: DISCONTINUED | OUTPATIENT
Start: 2017-09-18 | End: 2017-10-19 | Stop reason: HOSPADM

## 2017-09-18 RX ORDER — TRAZODONE HYDROCHLORIDE 50 MG/1
50 TABLET ORAL
Status: DISCONTINUED | OUTPATIENT
Start: 2017-09-18 | End: 2017-10-19 | Stop reason: HOSPADM

## 2017-09-18 RX ORDER — RISPERIDONE 1 MG/1
1 TABLET, ORALLY DISINTEGRATING ORAL
Status: DISCONTINUED | OUTPATIENT
Start: 2017-09-18 | End: 2017-10-19 | Stop reason: HOSPADM

## 2017-09-18 RX ORDER — LORAZEPAM 2 MG/ML
2 INJECTION INTRAMUSCULAR ONCE
Status: COMPLETED | OUTPATIENT
Start: 2017-09-18 | End: 2017-09-18

## 2017-09-18 RX ORDER — ACETAMINOPHEN 325 MG/1
650 TABLET ORAL EVERY 6 HOURS PRN
Status: DISCONTINUED | OUTPATIENT
Start: 2017-09-18 | End: 2017-10-19 | Stop reason: HOSPADM

## 2017-09-18 RX ORDER — BENZTROPINE MESYLATE 1 MG/1
1 TABLET ORAL EVERY 6 HOURS PRN
Status: DISCONTINUED | OUTPATIENT
Start: 2017-09-18 | End: 2017-10-19 | Stop reason: HOSPADM

## 2017-09-18 RX ADMIN — LORAZEPAM 2 MG: 2 INJECTION INTRAMUSCULAR; INTRAVENOUS at 13:58

## 2017-09-18 RX ADMIN — Medication 1.2 ML: at 13:59

## 2017-09-18 RX ADMIN — SODIUM CHLORIDE 1000 ML: 0.9 INJECTION, SOLUTION INTRAVENOUS at 09:11

## 2017-09-18 RX ADMIN — LORAZEPAM 2 MG: 1 TABLET ORAL at 12:42

## 2017-09-18 RX ADMIN — OLANZAPINE 10 MG: 5 TABLET, ORALLY DISINTEGRATING ORAL at 12:42

## 2017-09-18 RX ADMIN — ZIPRASIDONE MESYLATE 20 MG: 20 INJECTION, POWDER, LYOPHILIZED, FOR SOLUTION INTRAMUSCULAR at 13:58

## 2017-09-18 RX ADMIN — WATER 1.2 ML: 1 INJECTION INTRAMUSCULAR; INTRAVENOUS; SUBCUTANEOUS at 13:59

## 2017-09-19 PROBLEM — F20.9 SCHIZOPHRENIA (HCC): Status: ACTIVE | Noted: 2017-07-22

## 2017-09-19 LAB
ALBUMIN SERPL BCP-MCNC: 3.3 G/DL (ref 3.5–5)
ALP SERPL-CCNC: 55 U/L (ref 46–116)
ALT SERPL W P-5'-P-CCNC: 118 U/L (ref 12–78)
ANION GAP SERPL CALCULATED.3IONS-SCNC: 6 MMOL/L (ref 4–13)
AST SERPL W P-5'-P-CCNC: 73 U/L (ref 5–45)
BASOPHILS # BLD AUTO: 0.03 THOUSANDS/ΜL (ref 0–0.1)
BASOPHILS NFR BLD AUTO: 1 % (ref 0–1)
BILIRUB SERPL-MCNC: 0.5 MG/DL (ref 0.2–1)
BUN SERPL-MCNC: 14 MG/DL (ref 5–25)
CALCIUM SERPL-MCNC: 8.5 MG/DL (ref 8.3–10.1)
CHLORIDE SERPL-SCNC: 110 MMOL/L (ref 100–108)
CHOLEST SERPL-MCNC: 63 MG/DL (ref 50–200)
CO2 SERPL-SCNC: 26 MMOL/L (ref 21–32)
CREAT SERPL-MCNC: 0.74 MG/DL (ref 0.6–1.3)
EOSINOPHIL # BLD AUTO: 0.16 THOUSAND/ΜL (ref 0–0.61)
EOSINOPHIL NFR BLD AUTO: 3 % (ref 0–6)
ERYTHROCYTE [DISTWIDTH] IN BLOOD BY AUTOMATED COUNT: 14.2 % (ref 11.6–15.1)
GFR SERPL CREATININE-BSD FRML MDRD: 104 ML/MIN/1.73SQ M
GLUCOSE P FAST SERPL-MCNC: 79 MG/DL (ref 65–99)
GLUCOSE SERPL-MCNC: 79 MG/DL (ref 65–140)
HCT VFR BLD AUTO: 45.8 % (ref 36.5–49.3)
HDLC SERPL-MCNC: 54 MG/DL (ref 40–60)
HGB BLD-MCNC: 15.3 G/DL (ref 12–17)
LDLC SERPL CALC-MCNC: <0 MG/DL (ref 0–100)
LYMPHOCYTES # BLD AUTO: 3.51 THOUSANDS/ΜL (ref 0.6–4.47)
LYMPHOCYTES NFR BLD AUTO: 57 % (ref 14–44)
MCH RBC QN AUTO: 30.5 PG (ref 26.8–34.3)
MCHC RBC AUTO-ENTMCNC: 33.4 G/DL (ref 31.4–37.4)
MCV RBC AUTO: 91 FL (ref 82–98)
MONOCYTES # BLD AUTO: 0.28 THOUSAND/ΜL (ref 0.17–1.22)
MONOCYTES NFR BLD AUTO: 5 % (ref 4–12)
NEUTROPHILS # BLD AUTO: 2.07 THOUSANDS/ΜL (ref 1.85–7.62)
NEUTS SEG NFR BLD AUTO: 34 % (ref 43–75)
NRBC BLD AUTO-RTO: 0 /100 WBCS
PLATELET # BLD AUTO: 259 THOUSANDS/UL (ref 149–390)
PMV BLD AUTO: 10.9 FL (ref 8.9–12.7)
POTASSIUM SERPL-SCNC: 3.8 MMOL/L (ref 3.5–5.3)
PROT SERPL-MCNC: 6.9 G/DL (ref 6.4–8.2)
RBC # BLD AUTO: 5.02 MILLION/UL (ref 3.88–5.62)
RPR SER QL: REACTIVE
RPR SER-TITR: ABNORMAL {TITER}
SODIUM SERPL-SCNC: 142 MMOL/L (ref 136–145)
T3 SERPL-MCNC: 1 NG/ML (ref 0.6–1.8)
T4 SERPL-MCNC: 12.2 UG/DL (ref 4.7–13.3)
TRIGL SERPL-MCNC: 59 MG/DL
TSH SERPL DL<=0.05 MIU/L-ACNC: 0.73 UIU/ML (ref 0.36–3.74)
WBC # BLD AUTO: 6.06 THOUSAND/UL (ref 4.31–10.16)

## 2017-09-19 PROCEDURE — 86593 SYPHILIS TEST NON-TREP QUANT: CPT | Performed by: PSYCHIATRY & NEUROLOGY

## 2017-09-19 PROCEDURE — 84436 ASSAY OF TOTAL THYROXINE: CPT | Performed by: PSYCHIATRY & NEUROLOGY

## 2017-09-19 PROCEDURE — 84480 ASSAY TRIIODOTHYRONINE (T3): CPT | Performed by: PSYCHIATRY & NEUROLOGY

## 2017-09-19 PROCEDURE — 86780 TREPONEMA PALLIDUM: CPT | Performed by: PSYCHIATRY & NEUROLOGY

## 2017-09-19 PROCEDURE — 80061 LIPID PANEL: CPT | Performed by: PSYCHIATRY & NEUROLOGY

## 2017-09-19 PROCEDURE — 84443 ASSAY THYROID STIM HORMONE: CPT | Performed by: PSYCHIATRY & NEUROLOGY

## 2017-09-19 PROCEDURE — 80053 COMPREHEN METABOLIC PANEL: CPT | Performed by: PSYCHIATRY & NEUROLOGY

## 2017-09-19 PROCEDURE — 86592 SYPHILIS TEST NON-TREP QUAL: CPT | Performed by: PSYCHIATRY & NEUROLOGY

## 2017-09-19 PROCEDURE — 85025 COMPLETE CBC W/AUTO DIFF WBC: CPT | Performed by: PSYCHIATRY & NEUROLOGY

## 2017-09-19 RX ORDER — CITALOPRAM 10 MG/1
10 TABLET ORAL DAILY
Status: DISCONTINUED | OUTPATIENT
Start: 2017-09-19 | End: 2017-09-19

## 2017-09-19 RX ORDER — CITALOPRAM 10 MG/1
10 TABLET ORAL DAILY
Status: DISCONTINUED | OUTPATIENT
Start: 2017-09-20 | End: 2017-09-19

## 2017-09-19 RX ORDER — ARIPIPRAZOLE 15 MG/1
15 TABLET ORAL DAILY
Status: DISCONTINUED | OUTPATIENT
Start: 2017-09-19 | End: 2017-09-21

## 2017-09-19 RX ADMIN — NICOTINE 1 PATCH: 14 PATCH, EXTENDED RELEASE TRANSDERMAL at 10:14

## 2017-09-19 RX ADMIN — HALOPERIDOL 5 MG: 5 TABLET ORAL at 18:36

## 2017-09-19 RX ADMIN — BENZTROPINE MESYLATE 1 MG: 1 TABLET ORAL at 18:36

## 2017-09-19 RX ADMIN — ARIPIPRAZOLE 15 MG: 15 TABLET ORAL at 11:52

## 2017-09-19 RX ADMIN — LORAZEPAM 1 MG: 1 TABLET ORAL at 18:36

## 2017-09-19 NOTE — PROGRESS NOTES
Pt refused AM lab work  Staff explained the importance of lab work and he still refused  Maintained on one to one during the night for patient safety with out incident

## 2017-09-19 NOTE — PROGRESS NOTES
PT stated he was leaving and yelling at RN  PT then went back in his room and stated " She says I'm not leaving!" Pt was given PRN haldol 5 mg, ativan 1 mg and cogentin 1 mg PO for agitation
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
## 2017-09-19 NOTE — PROGRESS NOTES
Pt adm from [address] [facility] [facility] after he was brought in by EMS  Pt was found wandering in [place] with no shirt on  Pt was on NW5 August 2017 and has a HX of Schizophrenia and TBI  Pt on adm to ER was disoriented and paranoid  Pt on adm to this unit was sedated and sleepy  Pt told ER that he was "running from the police " Pt on adm had unsteady gait and low HR and stated he felt sleepy  Pt has HX of neurosyphilis and Hep C

## 2017-09-19 NOTE — TREATMENT PLAN
TREATMENT PLAN REVIEW - [address] 55 y o  1960 male MRN: [id number]    [address] [facility] Room / Bed: [facility] 889/UJ4 760-01 Encounter: [id number]          Admit Date/Time:  9/18/2017  8:02 PM    Treatment Team: Attending Provider: Brian Madrid MD; Patient Care Technician: Siri Arriola; Patient Care Technician: Forrest Richardson; Patient Care Technician: Luzmaria Pimentel; Registered Nurse: Philipp Davidson RN; Patient Care Technician: Bibi Wasserman; Patient Care Technician: Bienvenido Spencer; Care Coordinator: Loreto Parham;  : Lilly Mujica RN; Patient Care Technician: Carri Rodriguez    Diagnosis: Principal Problem:    Schizophrenia      Patient Strengths/Assets: cooperative    Patient Barriers/Limitations: limited support system, noncompliant with treatment, patient is on an involuntary commitment, poor insight, poor self-care, unable to express basic needs    Short Term Goals: decrease in paranoid thoughts, decrease in psychotic symptoms, ability to stay safe on the unit, ability to stay free of restraints, improvement in insight, improvement in self care, sleep improvement    Long Term Goals: resolution of psychotic symptoms, improved insight, able to express basic needs, acceptance of need for psychiatric follow up after discharge, adequate self care, adequate sleep, adequate appetite    Progress Towards Goals: starting psychiatric medications as prescribed    Recommended Treatment: medication management, patient medication education, group therapy, milieu therapy, continued Behavioral Health psychiatric evaluation/assessment process    Treatment Frequency: daily medication monitoring, group and milieu therapy daily, monitoring through interdisciplinary rounds, monitoring through weekly patient care conferences    Expected Discharge Date:  5 days    Discharge Plan: referral for outpatient medication management with a psychiatrist, referrals as indicated, return to previous living arrangement    Treatment Plan Created/Updated By: Rowan Tabares MD

## 2017-09-19 NOTE — CMS CERTIFICATION NOTE
Certification: Based upon physical, mental and social evaluations, I certify that inpatient psychiatric services are medically necessary for this patient for a duration of 5 midnights for the treatment of Schizophrenia  Available alternative community resources do not meet the patient's mental health care needs  I further attest that an established written individualized plan of care has been implemented and is outlined in the patient's medical records
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
## 2017-09-20 LAB — T PALLIDUM AB SER QL IF: REACTIVE

## 2017-09-20 RX ADMIN — HALOPERIDOL 5 MG: 5 TABLET ORAL at 20:19

## 2017-09-20 RX ADMIN — ARIPIPRAZOLE 15 MG: 15 TABLET ORAL at 08:43

## 2017-09-20 RX ADMIN — NICOTINE 1 PATCH: 14 PATCH, EXTENDED RELEASE TRANSDERMAL at 08:45

## 2017-09-20 RX ADMIN — LORAZEPAM 1 MG: 1 TABLET ORAL at 20:19
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
## 2017-09-20 NOTE — CASE MANAGEMENT
Pt presented to [facility] by EMS when police were called due to pt being found wandering around without a shirt on in [place],appearing delusional, paranoid, disoriented and psychotic  Pt has a history of inpt psych admits and was just in [place] in July of this year  Pt supposedly lives in a room for rent by himself and his landlord is Cesario Goodwin [phone]; CM will verify this information  Also, pt has a cousin named Brita Carrel [phone]  There is no other information at this time regarding contacts for this patient  Pt receives his medical and mental health care from [facility]  Pt's PCP is Marlen Vee of [facility] [phone]  His mental health care is [facility] [phone]  Pt was converted to a 201 previous to his admit to [facility]  (CM investigated the pt's status and he is not a 201, incorrect report, pt is a 302)

## 2017-09-20 NOTE — PROGRESS NOTES
Pt asleep at the start of the shift  When pt awoke he was disorientated  He insisted that he was going to leave and they were going to pick him up shortly  For a long time he thought he daughter was outside the door  Staff reorientated him but was not successful  He also asked for his polygraph test  Pt was given Risperdal PO

## 2017-09-20 NOTE — PLAN OF CARE
Problem: Ineffective Coping  Goal: Participates in unit activities  Interventions:  - Provide therapeutic environment   - Provide required programming   - Redirect inappropriate behaviors    Outcome: Not Progressing

## 2017-09-20 NOTE — PROGRESS NOTES
Progress Note - Behavioral Health   Ab Gr 55 y o  male MRN: [id number]  Unit/Bed#: DR9 760-01 Encounter: [id number]    Assessment/Plan   Principal Problem:    Schizophrenia      Behavior over the last 24 hours:  unchanged  Sleep: normal  Appetite: normal  Medication side effects: No  ROS: he is telling staff that he is leaving    Mental Status Evaluation:  Appearance:  age appropriate and disheveled   Behavior:  guarded   Speech:  normal pitch and normal volume   Mood:  decreased range   Affect:  flat   Thought Process:  disorganized and perserverative   Thought Content:  delusions  persecutory   Perceptual Disturbances: None   Risk Potential: Suicidal Ideations none, Homicidal Ideations none and Potential for Aggression No   Sensorium:  person and place   Cognition:  grossly intact   Consciousness:  alert and awake    Attention: attention span appeared shorter than expected for age   Insight:  poor   Judgment: poor    Gait/Station: normal gait/station   Motor Activity: no abnormal movements     Progress Toward Goals: Patient was admitted yesterday and his medication was restarted  He does not seem to be improving  He denies medication side effects  He has history of poor compliance and lacks adequate support system  He will be a good candidate for long acting injectable Maintenna vs Sustenna   is trying to contact [facility] to find out if patient is homeless  We have no information to contact his daughter "Bonney Brunner"  Recommended Treatment: Continue with group therapy, milieu therapy and occupational therapy  Risks, benefits and possible side effects of Medications:   Patient does not verbalize understanding at this time and will require further explanation        Medications:   current meds:   Current Facility-Administered Medications   Medication Dose Route Frequency    acetaminophen (TYLENOL) tablet 650 mg  650 mg Oral Q6H PRN    aluminum-magnesium hydroxide-simethicone (MYLANTA) 503-493-49 mg/5 mL oral suspension 30 mL  30 mL Oral Q4H PRN    ARIPiprazole (ABILIFY) tablet 15 mg  15 mg Oral Daily    benztropine (COGENTIN) injection 1 mg  1 mg Intramuscular Q6H PRN    benztropine (COGENTIN) tablet 1 mg  1 mg Oral Q6H PRN    haloperidol (HALDOL) tablet 5 mg  5 mg Oral Q6H PRN    haloperidol lactate (HALDOL) injection 5 mg  5 mg Intramuscular Q6H PRN    ibuprofen (MOTRIN) tablet 600 mg  600 mg Oral Q8H PRN    LORazepam (ATIVAN) 2 mg/mL injection 1 mg  1 mg Intramuscular Q6H PRN    LORazepam (ATIVAN) tablet 1 mg  1 mg Oral Q6H PRN    magnesium hydroxide (MILK OF MAGNESIA) 400 mg/5 mL oral suspension 30 mL  30 mL Oral Daily PRN    nicotine (NICODERM CQ) 14 mg/24hr TD 24 hr patch 1 patch  1 patch Transdermal Daily    risperiDONE (RisperDAL M-TABS) dispersible tablet 1 mg  1 mg Oral Q3H PRN    traZODone (DESYREL) tablet 50 mg  50 mg Oral HS PRN     Labs: reviewed, reactive RPR due to know syphilis infection  Counseling / Coordination of Care  Total floor / unit time spent today n/a minutes  Greater than 50% of total time was spent with the patient and / or family counseling and / or coordination of care   A description of the counseling / coordination of care:

## 2017-09-20 NOTE — PLAN OF CARE
Problem: PSYCHOSIS  Goal: Will report no hallucinations or delusions  Interventions:  - Administer medication as  ordered  - Every waking shifts and PRN assess for the presence of hallucinations and or delusions  - Assist with reality testing to support increasing orientation  - Assess if patient's hallucinations or delusions are encouraging self-harm or harm to others and intervene as appropriate   Outcome: Not Progressing      Problem: INVOLUNTARY ADMIT  Goal: Will cooperate with staff recommendations and doctor's orders and will demonstrate appropriate behavior  INTERVENTIONS:  - Treat underlying conditions and offer medication as ordered  - Educate regarding involuntary admission procedures and rules  - Utilize positive consistent limit setting strategies to support patient and staff safety   Outcome: Progressing      Problem: Prexisting or High Potential for Compromised Skin Integrity  Goal: Skin integrity is maintained or improved  INTERVENTIONS:  - Identify patients at risk for skin breakdown  - Assess and monitor skin integrity  - Assess and monitor nutrition and hydration status  - Monitor labs (i e  albumin)  - Assess for incontinence   - Turn and reposition patient  - Assist with mobility/ambulation  - Relieve pressure over bony prominences  - Avoid friction and shearing  - Provide appropriate hygiene as needed including keeping skin clean and dry  - Evaluate need for skin moisturizer/barrier cream  - Collaborate with interdisciplinary team (i e  Nutrition, Rehabilitation, etc )   - Patient/family teaching   Outcome: Progressing      Problem: Ineffective Coping  Goal: Cooperates with admission process  Interventions:   - Complete admission process   Outcome: Progressing    Goal: Identifies ineffective coping skills  Outcome: Not Progressing    Goal: Identifies healthy coping skills  Outcome: Not Progressing    Goal: Participates in unit activities  Interventions:  - Provide therapeutic environment   - Provide required programming   - Redirect inappropriate behaviors    Outcome: Not Progressing      Problem: DISCHARGE PLANNING  Goal: Discharge to home or other facility with appropriate resources  INTERVENTIONS:  - Identify barriers to discharge w/patient and caregiver  - Arrange for needed discharge resources and transportation as appropriate  - Identify discharge learning needs (meds, wound care, etc )  - Arrange for interpretive services to assist at discharge as needed  - Refer to Case Management Department for coordinating discharge planning if the patient needs post-hospital services based on physician/advanced practitioner order or complex needs related to functional status, cognitive ability, or social support system   Outcome: Progressing

## 2017-09-21 RX ORDER — ARIPIPRAZOLE 10 MG/1
20 TABLET ORAL DAILY
Status: DISCONTINUED | OUTPATIENT
Start: 2017-09-22 | End: 2017-09-23

## 2017-09-21 RX ADMIN — ARIPIPRAZOLE 15 MG: 15 TABLET ORAL at 09:05

## 2017-09-21 RX ADMIN — BENZTROPINE MESYLATE 1 MG: 1 TABLET ORAL at 10:02

## 2017-09-21 RX ADMIN — NICOTINE 1 PATCH: 14 PATCH, EXTENDED RELEASE TRANSDERMAL at 09:00

## 2017-09-21 RX ADMIN — LORAZEPAM 1 MG: 1 TABLET ORAL at 22:24

## 2017-09-21 RX ADMIN — LORAZEPAM 1 MG: 1 TABLET ORAL at 10:02

## 2017-09-21 RX ADMIN — HALOPERIDOL 5 MG: 5 TABLET ORAL at 10:02

## 2017-09-21 RX ADMIN — HALOPERIDOL 5 MG: 5 TABLET ORAL at 22:25

## 2017-09-21 NOTE — PROGRESS NOTES
PT given PRN haldol 5 mg, ativan 1 mg, and cogentin 1 mg PO for increasing agitation that was reported to RN  PT was pacing halls

## 2017-09-21 NOTE — CASE MANAGEMENT
Pt had been agitated this morning and wanting to leave immediately, discussed with pt that he was a 302 court commitment and that he could not leave and that he would have another hearing tomorrow for a 303 because the doctor wants him to continue to be treated on this unit for his mental health issues  Pt appeared to understand this and then stated that he would stay on the unit  Delusional and tangential sentences, states he presents himself well and needs to go to [employer], that he has things to do and then mumbles about the people who are after him - his daughter and police and other people  Spoke to Marquise Jaffe, his landlord, and she states that the pt has been renting a room from her for about 6 months and that he is welcome to come back to his room for rent when he gets out of the hospital  Marquise Jaffe states that the patient had no one in his life as far as she knows and that she has never met any family members or seen the patient with anyone  Marquise Jaffe is not sure if the patient has any family  Pt states he is open to taking a long-acting injectable medication

## 2017-09-21 NOTE — PLAN OF CARE
Problem: PSYCHOSIS  Goal: Will report no hallucinations or delusions  Interventions:  - Administer medication as  ordered  - Every waking shifts and PRN assess for the presence of hallucinations and or delusions  - Assist with reality testing to support increasing orientation  - Assess if patient's hallucinations or delusions are encouraging self-harm or harm to others and intervene as appropriate   Outcome: Progressing      Problem: INVOLUNTARY ADMIT  Goal: Will cooperate with staff recommendations and doctor's orders and will demonstrate appropriate behavior  INTERVENTIONS:  - Treat underlying conditions and offer medication as ordered  - Educate regarding involuntary admission procedures and rules  - Utilize positive consistent limit setting strategies to support patient and staff safety   Outcome: Progressing      Problem: Prexisting or High Potential for Compromised Skin Integrity  Goal: Skin integrity is maintained or improved  INTERVENTIONS:  - Identify patients at risk for skin breakdown  - Assess and monitor skin integrity  - Assess and monitor nutrition and hydration status  - Monitor labs (i e  albumin)  - Assess for incontinence   - Turn and reposition patient  - Assist with mobility/ambulation  - Relieve pressure over bony prominences  - Avoid friction and shearing  - Provide appropriate hygiene as needed including keeping skin clean and dry  - Evaluate need for skin moisturizer/barrier cream  - Collaborate with interdisciplinary team (i e  Nutrition, Rehabilitation, etc )   - Patient/family teaching   Outcome: Progressing      Problem: Ineffective Coping  Goal: Cooperates with admission process  Interventions:   - Complete admission process   Outcome: Progressing    Goal: Identifies ineffective coping skills  Outcome: Progressing    Goal: Identifies healthy coping skills  Outcome: Progressing    Goal: Participates in unit activities  Interventions:  - Provide therapeutic environment   - Provide required programming   - Redirect inappropriate behaviors    Outcome: Progressing      Problem: DISCHARGE PLANNING  Goal: Discharge to home or other facility with appropriate resources  INTERVENTIONS:  - Identify barriers to discharge w/patient and caregiver  - Arrange for needed discharge resources and transportation as appropriate  - Identify discharge learning needs (meds, wound care, etc )  - Arrange for interpretive services to assist at discharge as needed  - Refer to Case Management Department for coordinating discharge planning if the patient needs post-hospital services based on physician/advanced practitioner order or complex needs related to functional status, cognitive ability, or social support system   Outcome: Progressing

## 2017-09-21 NOTE — CASE MANAGEMENT
[facility] states they will not take the patient back into their program as of this time due to non-compliance issues

## 2017-09-21 NOTE — CASE MANAGEMENT
Prepared 303 documentation and faxed it to [facility], at 1040 this morning  Ruby Richardson states the hearing will be tomorrow, Friday, 9/22/17 at 0800  Ruby Richardson confirms that all 303 documentation is complete and correct  303 rights given to patient and he states he understands his rights and that his hearing is tomorrow morning
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
## 2017-09-21 NOTE — PROGRESS NOTES
Progress Note - Behavioral Health   Chang Park 55 y o  male MRN: [id number]  Unit/Bed#: SW3 760-01 Encounter: [id number]    Assessment/Plan   Principal Problem:    Schizophrenia      Behavior over the last 24 hours:  regressed  Sleep: insomnia  Appetite: normal  Medication side effects: No  ROS: agitated and requesting to be discharged    Mental Status Evaluation:  Appearance:  age appropriate and disheveled   Behavior:  uncooperative   Speech:  normal pitch and normal volume   Mood:  decreased range   Affect:  flat   Thought Process:  illogical, perserverative and tangential   Thought Content:  delusions  persecutory   Perceptual Disturbances: None   Risk Potential: Suicidal Ideations none, Homicidal Ideations none and Potential for Aggression No   Sensorium:  person   Cognition:  grossly intact   Consciousness:  alert and awake    Attention: attention span appeared shorter than expected for age   Insight:  poor   Judgment: poor   Gait/Station: normal gait/station   Motor Activity: no abnormal movements     Progress Toward Goals: Patient has been taking oral Abilify but no improvement  He seems to be more agitated and still delusional, disorganized and incoherent  He had trouble sleeping at night  Will increase Abilify to 20 mg  Consider long acting injectable if stabilized on oral medication  Recommended Treatment: Continue with group therapy, milieu therapy and occupational therapy  Risks, benefits and possible side effects of Medications:   Patient does not verbalize understanding at this time and will require further explanation        Medications:   current meds:   Current Facility-Administered Medications   Medication Dose Route Frequency    acetaminophen (TYLENOL) tablet 650 mg  650 mg Oral Q6H PRN    aluminum-magnesium hydroxide-simethicone (MYLANTA) 200-200-20 mg/5 mL oral suspension 30 mL  30 mL Oral Q4H PRN    ARIPiprazole (ABILIFY) tablet 15 mg  15 mg Oral Daily    benztropine (COGENTIN) injection 1 mg  1 mg Intramuscular Q6H PRN    benztropine (COGENTIN) tablet 1 mg  1 mg Oral Q6H PRN    haloperidol (HALDOL) tablet 5 mg  5 mg Oral Q6H PRN    haloperidol lactate (HALDOL) injection 5 mg  5 mg Intramuscular Q6H PRN    ibuprofen (MOTRIN) tablet 600 mg  600 mg Oral Q8H PRN    LORazepam (ATIVAN) 2 mg/mL injection 1 mg  1 mg Intramuscular Q6H PRN    LORazepam (ATIVAN) tablet 1 mg  1 mg Oral Q6H PRN    magnesium hydroxide (MILK OF MAGNESIA) 400 mg/5 mL oral suspension 30 mL  30 mL Oral Daily PRN    nicotine (NICODERM CQ) 14 mg/24hr TD 24 hr patch 1 patch  1 patch Transdermal Daily    risperiDONE (RisperDAL M-TABS) dispersible tablet 1 mg  1 mg Oral Q3H PRN    traZODone (DESYREL) tablet 50 mg  50 mg Oral HS PRN     Labs: reviewed    Counseling / Coordination of Care  Total floor / unit time spent today n/aminutes  Greater than 50% of total time was spent with the patient and / or family counseling and / or coordination of care   A description of the counseling / coordination of care:
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
## 2017-09-22 LAB
ALBUMIN SERPL BCP-MCNC: 3.2 G/DL (ref 3.5–5)
ALP SERPL-CCNC: 63 U/L (ref 46–116)
ALT SERPL W P-5'-P-CCNC: 143 U/L (ref 12–78)
AMMONIA PLAS-SCNC: 26 UMOL/L (ref 11–35)
ANION GAP SERPL CALCULATED.3IONS-SCNC: 4 MMOL/L (ref 4–13)
AST SERPL W P-5'-P-CCNC: 92 U/L (ref 5–45)
BILIRUB SERPL-MCNC: 0.29 MG/DL (ref 0.2–1)
BUN SERPL-MCNC: 9 MG/DL (ref 5–25)
CALCIUM SERPL-MCNC: 9.2 MG/DL (ref 8.3–10.1)
CHLORIDE SERPL-SCNC: 107 MMOL/L (ref 100–108)
CO2 SERPL-SCNC: 31 MMOL/L (ref 21–32)
CREAT SERPL-MCNC: 0.92 MG/DL (ref 0.6–1.3)
GFR SERPL CREATININE-BSD FRML MDRD: 93 ML/MIN/1.73SQ M
GLUCOSE SERPL-MCNC: 90 MG/DL (ref 65–140)
POTASSIUM SERPL-SCNC: 5.2 MMOL/L (ref 3.5–5.3)
PROT SERPL-MCNC: 7.2 G/DL (ref 6.4–8.2)
SODIUM SERPL-SCNC: 142 MMOL/L (ref 136–145)

## 2017-09-22 PROCEDURE — 80053 COMPREHEN METABOLIC PANEL: CPT | Performed by: PSYCHIATRY & NEUROLOGY

## 2017-09-22 PROCEDURE — 82140 ASSAY OF AMMONIA: CPT | Performed by: PSYCHIATRY & NEUROLOGY

## 2017-09-22 RX ADMIN — LORAZEPAM 1 MG: 1 TABLET ORAL at 21:54

## 2017-09-22 RX ADMIN — NICOTINE 1 PATCH: 14 PATCH, EXTENDED RELEASE TRANSDERMAL at 08:31

## 2017-09-22 RX ADMIN — PENICILLIN G BENZATHINE 2.4 MILLION UNITS: 1200000 INJECTION, SUSPENSION INTRAMUSCULAR at 15:52

## 2017-09-22 RX ADMIN — ARIPIPRAZOLE 20 MG: 10 TABLET ORAL at 08:31

## 2017-09-22 NOTE — PROGRESS NOTES
Pt is calm  Admits that he hears voices at times  States that he just "listens to them talk " Pt states that he last heard them last night  Pt states that he is here because people were after him because he has a lot of money  thoughts remains disorganized  Pt mumbling at times

## 2017-09-22 NOTE — PLAN OF CARE
Problem: PSYCHOSIS  Goal: Will report no hallucinations or delusions  Interventions:  - Administer medication as  ordered  - Every waking shifts and PRN assess for the presence of hallucinations and or delusions  - Assist with reality testing to support increasing orientation  - Assess if patient's hallucinations or delusions are encouraging self-harm or harm to others and intervene as appropriate   Outcome: Not Progressing  Patient continues delusional and paranoid  He is responding to auditory hallucinations      Problem: INVOLUNTARY ADMIT  Goal: Will cooperate with staff recommendations and doctor's orders and will demonstrate appropriate behavior  INTERVENTIONS:  - Treat underlying conditions and offer medication as ordered  - Educate regarding involuntary admission procedures and rules  - Utilize positive consistent limit setting strategies to support patient and staff safety   Outcome: Progressing      Problem: Prexisting or High Potential for Compromised Skin Integrity  Goal: Skin integrity is maintained or improved  INTERVENTIONS:  - Identify patients at risk for skin breakdown  - Assess and monitor skin integrity  - Assess and monitor nutrition and hydration status  - Monitor labs (i e  albumin)  - Assess for incontinence   - Turn and reposition patient  - Assist with mobility/ambulation  - Relieve pressure over bony prominences  - Avoid friction and shearing  - Provide appropriate hygiene as needed including keeping skin clean and dry  - Evaluate need for skin moisturizer/barrier cream  - Collaborate with interdisciplinary team (i e  Nutrition, Rehabilitation, etc )   - Patient/family teaching   Outcome: Progressing      Problem: Ineffective Coping  Goal: Cooperates with admission process  Interventions:   - Complete admission process   Outcome: Progressing

## 2017-09-22 NOTE — PLAN OF CARE
Problem: Ineffective Coping  Goal: Participates in unit activities  Interventions:  - Provide therapeutic environment   - Provide required programming   - Redirect inappropriate behaviors    Outcome: Progressing

## 2017-09-22 NOTE — PROGRESS NOTES
Progress Note - Behavioral Health   Trena Paris 55 y o  male MRN: [id number]  Unit/Bed#: CL7 760-01 Encounter: [id number]    Assessment/Plan   Principal Problem:    Schizophrenia      Behavior over the last 24 hours:  improved  Sleep: normal  Appetite: normal  Medication side effects: No  ROS: no complaints    Mental Status Evaluation:  Appearance:  age appropriate and disheveled   Behavior:  guarded   Speech:  soft   Mood:  irritable and labile   Affect:  labile   Thought Process:  circumstantial   Thought Content:  delusions  persecutory   Perceptual Disturbances: None   Risk Potential: Suicidal Ideations none, Homicidal Ideations none and Potential for Aggression No   Sensorium:  person and place   Cognition:  grossly intact   Consciousness:  alert and awake    Attention: attention span appeared shorter than expected for age   Insight:  limited   Judgment: limited   Gait/Station: normal gait/station   Motor Activity: no abnormal movements     Progress Toward Goals: Patient seems calmer after increase on Abilify to 20 mg  He is aware that ID wants to treat his latent syphilis with 3 doses of antibiotic once weekly  He talked about wanting to move out of state but seemed less disorganized  Recommended Treatment: Continue with group therapy, milieu therapy and occupational therapy  Risks, benefits and possible side effects of Medications:   Risks, benefits, and possible side effects of medications explained to patient and patient verbalizes understanding        Medications:   current meds:   Current Facility-Administered Medications   Medication Dose Route Frequency    acetaminophen (TYLENOL) tablet 650 mg  650 mg Oral Q6H PRN    aluminum-magnesium hydroxide-simethicone (MYLANTA) 200-200-20 mg/5 mL oral suspension 30 mL  30 mL Oral Q4H PRN    amLODIPine (NORVASC) tablet 10 mg  10 mg Oral Daily    ARIPiprazole (ABILIFY) tablet 20 mg  20 mg Oral Daily    benztropine (COGENTIN) injection 1 mg  1 mg Intramuscular Q6H PRN    benztropine (COGENTIN) tablet 1 mg  1 mg Oral Q6H PRN    haloperidol (HALDOL) tablet 5 mg  5 mg Oral Q6H PRN    haloperidol lactate (HALDOL) injection 5 mg  5 mg Intramuscular Q6H PRN    ibuprofen (MOTRIN) tablet 600 mg  600 mg Oral Q8H PRN    LORazepam (ATIVAN) 2 mg/mL injection 1 mg  1 mg Intramuscular Q6H PRN    LORazepam (ATIVAN) tablet 1 mg  1 mg Oral Q6H PRN    magnesium hydroxide (MILK OF MAGNESIA) 400 mg/5 mL oral suspension 30 mL  30 mL Oral Daily PRN    nicotine (NICODERM CQ) 14 mg/24hr TD 24 hr patch 1 patch  1 patch Transdermal Daily    risperiDONE (RisperDAL M-TABS) dispersible tablet 1 mg  1 mg Oral Q3H PRN    traZODone (DESYREL) tablet 50 mg  50 mg Oral HS PRN     Labs: reviewed    Counseling / Coordination of Care  Total floor / unit time spent today n/a minutes  Greater than 50% of total time was spent with the patient and / or family counseling and / or coordination of care   A description of the counseling / coordination of care:

## 2017-09-22 NOTE — PROGRESS NOTES
He appears lost and confused often and needs redirection I e  Standing in his shower fully dressed with arms full of towels and pillows  He seemed to have no idea that he was in a shower or why he was there  He had left the sitting room minutes before this  Not interacting in the milieu with peers  He sits in the milieu, expressionless and flat

## 2017-09-22 NOTE — PROGRESS NOTES
Officer Spencer York # [id number] [facility] called to say several bottles of medications were found in an abandoned property with this patient's name and he was aware that the patient had been in that area when the police found him and brought him to [facility] ER  He or his family can pick the medications up when he leaves hospital  The precinct Ph # [phone]  Patient received IM Penicillin this afternoon  He was cooperative with the injections
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
## 2017-09-22 NOTE — CASE MANAGEMENT
Called [facility] for pre-auth status for Maria Teresa Woodson and had completed their pre-auth form on 9/21 which had been faxed immediately to them  Therefore, [facility] stated today that pt is approved for Abilify Maintena to receive a dose every 30 days and that it is covered by his insurance and there should be no problem whatsoever with pt getting this medication paid for by his insurance as per Dane Pedraza from [facility]   Called [facility] and they state they are taking new patients and an ACT referral was completed and sent to them and pt agreed to have ACT and that it would be good for him to have therapists in his life and a team

## 2017-09-22 NOTE — CASE MANAGEMENT
Pt had his [facility] hearing today and pt received up to 20 days of inpatient treatment  Pt was cooperative but delusional and disorganized during his hearing, stating that he has a lot of money in the bank, is going [employer] for a job and was a  at one time

## 2017-09-22 NOTE — PROGRESS NOTES
Patient wants to leave  The same themes I e  His daughter is trying to kill him, nobody wants him to leave [place] "I'm leaving [place] tomorrow " Offered and accepted Haldol 5mgs and Ativan 1mg po prn

## 2017-09-23 RX ORDER — AMLODIPINE BESYLATE 10 MG/1
10 TABLET ORAL DAILY
Status: DISCONTINUED | OUTPATIENT
Start: 2017-09-23 | End: 2017-10-19 | Stop reason: HOSPADM

## 2017-09-23 RX ORDER — ARIPIPRAZOLE 10 MG/1
20 TABLET ORAL DAILY
Status: DISCONTINUED | OUTPATIENT
Start: 2017-09-24 | End: 2017-09-26

## 2017-09-23 RX ADMIN — BENZTROPINE MESYLATE 1 MG: 1 TABLET ORAL at 08:11

## 2017-09-23 RX ADMIN — NICOTINE 1 PATCH: 14 PATCH, EXTENDED RELEASE TRANSDERMAL at 08:18

## 2017-09-23 RX ADMIN — HALOPERIDOL 5 MG: 5 TABLET ORAL at 17:11

## 2017-09-23 RX ADMIN — ARIPIPRAZOLE 20 MG: 10 TABLET ORAL at 08:11

## 2017-09-23 RX ADMIN — AMLODIPINE BESYLATE 10 MG: 10 TABLET ORAL at 17:09

## 2017-09-23 RX ADMIN — LORAZEPAM 1 MG: 1 TABLET ORAL at 17:11
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
## 2017-09-23 NOTE — PLAN OF CARE
Problem: INVOLUNTARY ADMIT  Goal: Will cooperate with staff recommendations and doctor's orders and will demonstrate appropriate behavior  INTERVENTIONS:  - Treat underlying conditions and offer medication as ordered  - Educate regarding involuntary admission procedures and rules  - Utilize positive consistent limit setting strategies to support patient and staff safety   Outcome: Progressing

## 2017-09-23 NOTE — PROGRESS NOTES
Pt keeps talking about his daughter who wants to get him killed  Pt denies voices but he does seem to be responding to internal stimuli  At the beginning of this shift pt was irritable and demanding to get his belt and wallet so he can leave and go home because he was going to be picked up by his cousin soon  PRN Ativan and Haldol administered which has been effective as pt is calm at present  Pt is currently in the day room watching TV

## 2017-09-23 NOTE — PLAN OF CARE
Problem: PSYCHOSIS  Goal: Will report no hallucinations or delusions  Interventions:  - Administer medication as  ordered  - Every waking shifts and PRN assess for the presence of hallucinations and or delusions  - Assist with reality testing to support increasing orientation  - Assess if patient's hallucinations or delusions are encouraging self-harm or harm to others and intervene as appropriate   Outcome: Not Progressing

## 2017-09-23 NOTE — PROGRESS NOTES
Patient has not been asking to leave this evening and he has not been irritable or angry  He still voices delusional ideation about being very wealthy and believes his daughter is trying to kill him  He asked for medication in late evening and received Ativan 1mg po prn  AST and ALT are increased on today's bloodwork

## 2017-09-23 NOTE — PROGRESS NOTES
Progress Note - Behavioral Health   Jovana Matthews 55 y o  male MRN: [id number]  Unit/Bed#: HF7 760-01 Encounter: [id number]    Assessment/Plan   Principal Problem:    Schizophrenia      Subjective: patient remained compliant with medications with no acute side effects  He reports reduction in auditory hallucinations but continues to express ideations of him being wealthy and daughter trying to kill him  The patient is hypertensive and abnormal increase in ALT and AST noted  He remained asymptomatic  Current Medications:    [START ON 9/24/2017] ARIPiprazole 25 mg Oral Daily   nicotine 1 patch Transdermal Daily       Behavioral Health Medications: all current active meds have been reviewed  Vitals:  Vitals:    09/23/17 0736   BP: (!) 188/93   Pulse: 78   Resp: 16   Temp: 97 8 °F (36 6 °C)       Labs: reviewed    Psychiatric Review of Systems:  Behavior over the last 24 hours:  Slow imrpovement  Sleep: normal  Appetite: normal  Medication side effects: No  ROS: no complaints    Mental Status Evaluation:  Appearance:  casually dressed   Behavior:  guarded   Speech:  soft   Mood:  anxious and depressed   Affect:  increased in range   Language naming objects and repeating phrases   Thought Process:  circumstantial and disorganized   Thought Content:  delusions  persecutory   Perceptual Disturbances: Auditory hallucinations without commands   Risk Potential: Suicidal Ideations without plan, Homicidal Ideations none and Potential for Aggression No   Sensorium:  person, place and time/date   Cognition:  grossly intact   Consciousness:  awake    Attention: attention span appeared shorter than expected for age   Insight:  limited   Judgment: limited   Intellect fair   Gait/Station: normal gait/station and normal balance   Motor Activity: no abnormal movements     Progress Toward Goals: slow progress    Recommended Treatment:   Consult Medicine: persistence of hypertension    I will not increase his Abilify till hypertension is well controlled or he is medically cleared  Continue with group therapy, milieu therapy and occupational therapy  Continue following current medications:   [START ON 9/24/2017] ARIPiprazole 25 mg Oral Daily   nicotine 1 patch Transdermal Daily       Risks, benefits and possible side effects of Medications:   Risks, benefits, and possible side effects of medications explained to patient and patient verbalizes understanding  Portions of the record may have been created with voice recognition software  Occasional wrong word or "sound a like" substitutions may have occurred due to the inherent limitations of voice recognition software  Read the chart carefully and recognize, using context, where substitutions have occurred  There may be translation, syntax,  or grammatical errors  If you have any questions, please contact the dictating provider

## 2017-09-23 NOTE — PLAN OF CARE
Problem: Ineffective Coping  Goal: Cooperates with admission process  Interventions:   - Complete admission process   Outcome: Progressing

## 2017-09-23 NOTE — PLAN OF CARE
Problem: Prexisting or High Potential for Compromised Skin Integrity  Goal: Skin integrity is maintained or improved  INTERVENTIONS:  - Identify patients at risk for skin breakdown  - Assess and monitor skin integrity  - Assess and monitor nutrition and hydration status  - Monitor labs (i e  albumin)  - Assess for incontinence   - Turn and reposition patient  - Assist with mobility/ambulation  - Relieve pressure over bony prominences  - Avoid friction and shearing  - Provide appropriate hygiene as needed including keeping skin clean and dry  - Evaluate need for skin moisturizer/barrier cream  - Collaborate with interdisciplinary team (i e  Nutrition, Rehabilitation, etc )   - Patient/family teaching   Outcome: Progressing

## 2017-09-23 NOTE — PLAN OF CARE

## 2017-09-24 ENCOUNTER — APPOINTMENT (INPATIENT)
Dept: RADIOLOGY | Facility: HOSPITAL | Age: 57
DRG: 750 | End: 2017-09-24
Payer: COMMERCIAL

## 2017-09-24 LAB
ALBUMIN SERPL BCP-MCNC: 3.2 G/DL (ref 3.5–5)
ALP SERPL-CCNC: 62 U/L (ref 46–116)
ALT SERPL W P-5'-P-CCNC: 129 U/L (ref 12–78)
AST SERPL W P-5'-P-CCNC: 61 U/L (ref 5–45)
BILIRUB DIRECT SERPL-MCNC: 0.09 MG/DL (ref 0–0.2)
BILIRUB SERPL-MCNC: 0.22 MG/DL (ref 0.2–1)
PROT SERPL-MCNC: 7.5 G/DL (ref 6.4–8.2)

## 2017-09-24 PROCEDURE — 82105 ALPHA-FETOPROTEIN SERUM: CPT | Performed by: INTERNAL MEDICINE

## 2017-09-24 PROCEDURE — 80076 HEPATIC FUNCTION PANEL: CPT | Performed by: INTERNAL MEDICINE

## 2017-09-24 PROCEDURE — 76705 ECHO EXAM OF ABDOMEN: CPT

## 2017-09-24 RX ADMIN — NICOTINE 1 PATCH: 14 PATCH, EXTENDED RELEASE TRANSDERMAL at 08:10

## 2017-09-24 RX ADMIN — LORAZEPAM 1 MG: 1 TABLET ORAL at 16:39

## 2017-09-24 RX ADMIN — AMLODIPINE BESYLATE 10 MG: 10 TABLET ORAL at 08:07

## 2017-09-24 RX ADMIN — HALOPERIDOL 5 MG: 5 TABLET ORAL at 16:39

## 2017-09-24 RX ADMIN — ARIPIPRAZOLE 20 MG: 10 TABLET ORAL at 08:07

## 2017-09-24 NOTE — PROGRESS NOTES
Progress Note - Behavioral Health   Vivi Thomas 55 y o  male MRN: [id number]  Unit/Bed#: KO6 760-01 Encounter: [id number]    Assessment/Plan   Principal Problem:    Schizophrenia  Active Problems:    Benign essential HTN      Subjective:  Patient remained compliant with medications with no acute side effects  Patient with episode of agitation in the evening time for he is focused on leaving the hospital   During interaction with me patient appears guarded and paranoid and more focused on leaving but was more redirectable today  Patient was seen by Medicine yesterday for hypertension and abnormal liver enzymes  Patient underwent liver ultrasound today which reveals the following findings:- Mild hepatomegaly  Echogenicity and echotexture appear otherwise within normal limits  Patent hepatic vasculature with appropriate flow direction  Current Medications:    amLODIPine 10 mg Oral Daily   ARIPiprazole 20 mg Oral Daily   nicotine 1 patch Transdermal Daily       Behavioral Health Medications: all current active meds have been reviewed      Vitals:  Vitals:    09/24/17 0732   BP: 150/81   Pulse: 60   Resp: 16   Temp: 97 9 °F (36 6 °C)       Labs: reviewed    Psychiatric Review of Systems:  Behavior over the last 24 hours:  Slow improvement  Sleep: normal  Appetite: normal  Medication side effects: No  ROS: no complaints    Mental Status Evaluation:  Appearance:  casually dressed   Behavior:  guarded   Speech:  soft   Mood:  angry, anxious and depressed   Affect:  constricted   Language naming objects and repeating phrases   Thought Process:  disorganized   Thought Content:  delusions  persecutory   Perceptual Disturbances: less preoccupied   Risk Potential: Suicidal Ideations without plan, Homicidal Ideations none and Potential for Aggression No   Sensorium:  person, place and time/date   Cognition:  grossly intact   Consciousness:  awake    Attention: attention span appeared shorter than expected for age Insight:  limited   Judgment: limited   Intellect fair   Gait/Station: normal gait/station and normal balance   Motor Activity: no abnormal movements     Progress Toward Goals: progressing slowly    Recommended Treatment:   Continue with group therapy, milieu therapy and occupational therapy  Continue following current medications:   amLODIPine 10 mg Oral Daily   ARIPiprazole 20 mg Oral Daily   nicotine 1 patch Transdermal Daily       Risks, benefits and possible side effects of Medications:   Risks, benefits, and possible side effects of medications explained to patient and patient verbalizes understanding  Portions of the record may have been created with voice recognition software  Occasional wrong word or "sound a like" substitutions may have occurred due to the inherent limitations of voice recognition software  Read the chart carefully and recognize, using context, where substitutions have occurred  There may be translation, syntax,  or grammatical errors  If you have any questions, please contact the dictating provider

## 2017-09-25 RX ORDER — LISINOPRIL 10 MG/1
10 TABLET ORAL DAILY
Status: DISCONTINUED | OUTPATIENT
Start: 2017-09-25 | End: 2017-09-26

## 2017-09-25 RX ADMIN — AMLODIPINE BESYLATE 10 MG: 10 TABLET ORAL at 08:53

## 2017-09-25 RX ADMIN — LISINOPRIL 10 MG: 10 TABLET ORAL at 08:54

## 2017-09-25 RX ADMIN — NICOTINE 1 PATCH: 14 PATCH, EXTENDED RELEASE TRANSDERMAL at 09:00

## 2017-09-25 RX ADMIN — ARIPIPRAZOLE 20 MG: 10 TABLET ORAL at 08:52

## 2017-09-25 RX ADMIN — TRAZODONE HYDROCHLORIDE 50 MG: 50 TABLET ORAL at 21:47

## 2017-09-25 NOTE — PROGRESS NOTES
Progress Note - Behavioral Health   Chang Park 55 y o  male MRN: [id number]  Unit/Bed#: UC1 760-01 Encounter: [id number]    Assessment/Plan   Principal Problem:    Schizophrenia  Active Problems:    Benign essential HTN      Behavior over the last 24 hours:  unchanged  Sleep: normal  Appetite: normal  Medication side effects: No  ROS: no complaints    Mental Status Evaluation:  Appearance:  thin & gaunt looking   Behavior:  guarded   Speech:  soft   Mood:  anxious and decreased range   Affect:  flat   Thought Process:  disorganized, illogical and loose associations   Thought Content:  delusions  grandiose and persecutory   Perceptual Disturbances: denies   Risk Potential: denies   Sensorium:  person   Cognition:  grossly intact   Consciousness:  awake    Attention: attention span and concentration were age appropriate   Insight:  limited   Judgment: limited   Gait/Station: normal gait/station   Motor Activity: no abnormal movements     Progress Toward Goals: Pt was seen this morning in individual session,quite disorganized and irational with lots of delusional material Pt rather grandiose as he talked about his great wealth  Monique [place] remains limited,he is however medication compliant and tolerating Abilify with no reported side effects  Recommended Treatment:  1-Cont abilify 20mg daily  2- Continue with group therapy, milieu therapy and occupational therapy  Risks, benefits and possible side effects of Medications:   Risks, benefits, and possible side effects of medications explained to patient and patient verbalizes understanding        Medications:   current meds:   Current Facility-Administered Medications   Medication Dose Route Frequency    acetaminophen (TYLENOL) tablet 650 mg  650 mg Oral Q6H PRN    aluminum-magnesium hydroxide-simethicone (MYLANTA) 200-200-20 mg/5 mL oral suspension 30 mL  30 mL Oral Q4H PRN    amLODIPine (NORVASC) tablet 10 mg  10 mg Oral Daily    ARIPiprazole (ABILIFY) tablet 20 mg  20 mg Oral Daily    benztropine (COGENTIN) injection 1 mg  1 mg Intramuscular Q6H PRN    benztropine (COGENTIN) tablet 1 mg  1 mg Oral Q6H PRN    haloperidol (HALDOL) tablet 5 mg  5 mg Oral Q6H PRN    haloperidol lactate (HALDOL) injection 5 mg  5 mg Intramuscular Q6H PRN    ibuprofen (MOTRIN) tablet 600 mg  600 mg Oral Q8H PRN    lisinopril (ZESTRIL) tablet 10 mg  10 mg Oral Daily    LORazepam (ATIVAN) 2 mg/mL injection 1 mg  1 mg Intramuscular Q6H PRN    LORazepam (ATIVAN) tablet 1 mg  1 mg Oral Q6H PRN    magnesium hydroxide (MILK OF MAGNESIA) 400 mg/5 mL oral suspension 30 mL  30 mL Oral Daily PRN    nicotine (NICODERM CQ) 14 mg/24hr TD 24 hr patch 1 patch  1 patch Transdermal Daily    risperiDONE (RisperDAL M-TABS) dispersible tablet 1 mg  1 mg Oral Q3H PRN    traZODone (DESYREL) tablet 50 mg  50 mg Oral HS PRN     Labs: I have personally reviewed pt's labs    Counseling / Coordination of Care  Total floor / unit time spent today 25 minutes  Greater than 50% of total time was spent with the patient and / or family counseling and / or coordination of care   A description of the counseling / coordination of care:

## 2017-09-25 NOTE — CASE MANAGEMENT
CM called [address] to follow up with ACT referral which was faxed last week  ACT confirmed they did receive the referral  and said Shama Mcdonough ( ) is off today and will be back tomorrow   CM will call tomorrow and speak to Shama Mcdonough regarding status of referral

## 2017-09-25 NOTE — PROGRESS NOTES
Patient scant but pleasant in conversation and cooperative with medication this mornings  Denied symptoms and needs other than lotion for dry skin  Went back to bed after group, though he did say he slept pretty well last night

## 2017-09-25 NOTE — PROGRESS NOTES
IM Residency Progress Note   Unit/Bed#: NW7 760-01 Encounter: [id number]  SOD Team C       Pippa Morrison 55 y o  male [address] Stay Days: 7      Assessment/Plan:    Principal Problem:    Schizophrenia  Active Problems:    Benign essential HTN    Elevated liver enzymes  -Patient has history of hep C, and elevated liver enzymes are most likely due to that  -Patient also has history of alcoholism, elevated liver enzymes could also be multifactorial including his alcohol abuse  -Liver enzymes are trending down   -RUQ US showed mild hepatomegaly, echogenicity and echotexture appear otherwise within normal limits  Patent hepatic vasculature with appropriate flow direction     Hypertension  -Continue amlodipine and Lisinopril        Disposition: Continued psych care        Subjective: Pt was seen and examined this morning  He denied fever, chills, nausea, and vomiting  He reported that he is doing well this morning  Denied dysuria, constipation, diarrhea  Vitals: Temp (24hrs), Av °F (36 7 °C), Min:97 7 °F (36 5 °C), Max:98 2 °F (36 8 °C)  Current: Temperature: 97 7 °F (36 5 °C)  Vitals:    17 1530 17 1700 17 0714 17 1107   BP: (!) 179/78 150/82 150/79 135/74   Pulse: 70 70 58 82   Resp: 16  16    Temp: 98 2 °F (36 8 °C)  97 7 °F (36 5 °C)    TempSrc: Oral  Oral    Weight:       Height:        Body mass index is 20 05 kg/m²  No intake/output data recorded  Physical Exam   Constitutional: He is oriented to person, place, and time  He appears well-developed and well-nourished  No distress  HENT:   Head: Normocephalic and atraumatic  Eyes: Conjunctivae are normal  Right eye exhibits no discharge  Left eye exhibits no discharge  Neck: Neck supple  No JVD present  Cardiovascular: Normal rate and regular rhythm  No murmur heard  Pulmonary/Chest: Breath sounds normal  No respiratory distress  He has no wheezes  Abdominal: Soft  He exhibits no distension   There is no tenderness  There is no rebound and no guarding  Musculoskeletal: Normal range of motion  He exhibits no edema  Neurological: He is alert and oriented to person, place, and time  No cranial nerve deficit  Skin: Skin is warm and dry  He is not diaphoretic  No erythema  Invasive Devices          No matching active lines, drains, or airways                    Labs: No results found for this or any previous visit (from the past 24 hour(s))  Radiology Results: I have personally reviewed pertinent reports  Other Diagnostic Testing:   I have personally reviewed pertinent reports          Active Meds:   Current Facility-Administered Medications   Medication Dose Route Frequency    acetaminophen (TYLENOL) tablet 650 mg  650 mg Oral Q6H PRN    aluminum-magnesium hydroxide-simethicone (MYLANTA) 200-200-20 mg/5 mL oral suspension 30 mL  30 mL Oral Q4H PRN    amLODIPine (NORVASC) tablet 10 mg  10 mg Oral Daily    ARIPiprazole (ABILIFY) tablet 20 mg  20 mg Oral Daily    benztropine (COGENTIN) injection 1 mg  1 mg Intramuscular Q6H PRN    benztropine (COGENTIN) tablet 1 mg  1 mg Oral Q6H PRN    haloperidol (HALDOL) tablet 5 mg  5 mg Oral Q6H PRN    haloperidol lactate (HALDOL) injection 5 mg  5 mg Intramuscular Q6H PRN    ibuprofen (MOTRIN) tablet 600 mg  600 mg Oral Q8H PRN    lisinopril (ZESTRIL) tablet 10 mg  10 mg Oral Daily    LORazepam (ATIVAN) 2 mg/mL injection 1 mg  1 mg Intramuscular Q6H PRN    LORazepam (ATIVAN) tablet 1 mg  1 mg Oral Q6H PRN    magnesium hydroxide (MILK OF MAGNESIA) 400 mg/5 mL oral suspension 30 mL  30 mL Oral Daily PRN    nicotine (NICODERM CQ) 14 mg/24hr TD 24 hr patch 1 patch  1 patch Transdermal Daily    risperiDONE (RisperDAL M-TABS) dispersible tablet 1 mg  1 mg Oral Q3H PRN    traZODone (DESYREL) tablet 50 mg  50 mg Oral HS PRN         VTE Pharmacologic Prophylaxis: Reason for no pharmacologic prophylaxis low risk  VTE Mechanical Prophylaxis: sequential

## 2017-09-25 NOTE — PROGRESS NOTES
At the beginning of this shift pt came up to nurses station to say that he needed his belt, wallet and shirt because he is being picked up by his cousin Syeda Farrell) and his girlfriend with the helicopter  Pt told that he is not being d/c and PRN adm   Pt has been calmer after PRN  Pt is in his room talking to slef constantly all shift  Pt denies SI, HI and denies anxiety and depression

## 2017-09-25 NOTE — PLAN OF CARE
Problem: PSYCHOSIS  Goal: Will report no hallucinations or delusions  Interventions:  - Administer medication as  ordered  - Every waking shifts and PRN assess for the presence of hallucinations and or delusions  - Assist with reality testing to support increasing orientation  - Assess if patient's hallucinations or delusions are encouraging self-harm or harm to others and intervene as appropriate   Outcome: Progressing      Problem: INVOLUNTARY ADMIT  Goal: Will cooperate with staff recommendations and doctor's orders and will demonstrate appropriate behavior  INTERVENTIONS:  - Treat underlying conditions and offer medication as ordered  - Educate regarding involuntary admission procedures and rules  - Utilize positive consistent limit setting strategies to support patient and staff safety   Outcome: Progressing      Problem: Prexisting or High Potential for Compromised Skin Integrity  Goal: Skin integrity is maintained or improved  INTERVENTIONS:  - Identify patients at risk for skin breakdown  - Assess and monitor skin integrity  - Assess and monitor nutrition and hydration status  - Monitor labs (i e  albumin)  - Assess for incontinence   - Turn and reposition patient  - Assist with mobility/ambulation  - Relieve pressure over bony prominences  - Avoid friction and shearing  - Provide appropriate hygiene as needed including keeping skin clean and dry  - Evaluate need for skin moisturizer/barrier cream  - Collaborate with interdisciplinary team (i e  Nutrition, Rehabilitation, etc )   - Patient/family teaching   Outcome: Progressing      Problem: Ineffective Coping  Goal: Cooperates with admission process  Interventions:   - Complete admission process   Outcome: Completed Date Met: 09/25/17    Goal: Identifies ineffective coping skills  Outcome: Progressing    Goal: Identifies healthy coping skills  Outcome: Not Progressing  Patient scant in conversation with staff  Goal: Participates in unit activities  Interventions:  - Provide therapeutic environment   - Provide required programming   - Redirect inappropriate behaviors    Outcome: Progressing

## 2017-09-26 LAB
AFP-TM SERPL-MCNC: 15 NG/ML (ref 0–8.3)
ALBUMIN SERPL BCP-MCNC: 3.2 G/DL (ref 3.5–5)
ALP SERPL-CCNC: 61 U/L (ref 46–116)
ALT SERPL W P-5'-P-CCNC: 141 U/L (ref 12–78)
ANION GAP SERPL CALCULATED.3IONS-SCNC: 7 MMOL/L (ref 4–13)
AST SERPL W P-5'-P-CCNC: 92 U/L (ref 5–45)
BILIRUB SERPL-MCNC: 0.42 MG/DL (ref 0.2–1)
BUN SERPL-MCNC: 13 MG/DL (ref 5–25)
CALCIUM SERPL-MCNC: 8.7 MG/DL (ref 8.3–10.1)
CHLORIDE SERPL-SCNC: 105 MMOL/L (ref 100–108)
CO2 SERPL-SCNC: 27 MMOL/L (ref 21–32)
CREAT SERPL-MCNC: 0.95 MG/DL (ref 0.6–1.3)
GFR SERPL CREATININE-BSD FRML MDRD: 90 ML/MIN/1.73SQ M
GLUCOSE P FAST SERPL-MCNC: 139 MG/DL (ref 65–99)
GLUCOSE SERPL-MCNC: 139 MG/DL (ref 65–140)
POTASSIUM SERPL-SCNC: 4.5 MMOL/L (ref 3.5–5.3)
PROT SERPL-MCNC: 7.2 G/DL (ref 6.4–8.2)
SODIUM SERPL-SCNC: 139 MMOL/L (ref 136–145)

## 2017-09-26 PROCEDURE — 80053 COMPREHEN METABOLIC PANEL: CPT | Performed by: INTERNAL MEDICINE

## 2017-09-26 RX ORDER — LISINOPRIL 20 MG/1
20 TABLET ORAL DAILY
Status: DISCONTINUED | OUTPATIENT
Start: 2017-09-27 | End: 2017-10-19 | Stop reason: HOSPADM

## 2017-09-26 RX ADMIN — AMLODIPINE BESYLATE 10 MG: 10 TABLET ORAL at 08:13

## 2017-09-26 RX ADMIN — TRAZODONE HYDROCHLORIDE 50 MG: 50 TABLET ORAL at 21:01

## 2017-09-26 RX ADMIN — ARIPIPRAZOLE 20 MG: 10 TABLET ORAL at 08:14

## 2017-09-26 RX ADMIN — NICOTINE 1 PATCH: 14 PATCH, EXTENDED RELEASE TRANSDERMAL at 08:17

## 2017-09-26 RX ADMIN — LISINOPRIL 10 MG: 10 TABLET ORAL at 08:13

## 2017-09-26 NOTE — CASE MANAGEMENT
CM completed the referral to [facility] for outpt [address] services  Intake appointment is scheduled October 11th at 1100   (noted in AVS)

## 2017-09-26 NOTE — PROGRESS NOTES
Progress Note - Behavioral Health   Uriah Hayden 55 y o  male MRN: [id number]  Unit/Bed#: KU0 760-01 Encounter: [id number]    Assessment/Plan   Principal Problem:    Schizophrenia  Active Problems:    Benign essential HTN      Behavior over the last 24 hours:  unchanged  Sleep: normal  Appetite: normal  Medication side effects: No  ROS: no complaints    Mental Status Evaluation:  Appearance:  thin & gaunt looking   Behavior:  guarded   Speech:  soft   Mood:  anxious and decreased range   Affect:  flat   Thought Process:  disorganized, illogical and loose associations   Thought Content:  delusions  grandiose and persecutory   Perceptual Disturbances: denies   Risk Potential: denies   Sensorium:  person   Cognition:  grossly intact   Consciousness:  awake    Attention: attention span and concentration were age appropriate   Insight:  limited   Judgment: limited   Gait/Station: normal gait/station   Motor Activity: no abnormal movements     Progress Toward Goals: Pt was seen this morning in individual session in his room,seemed a bit more logical and better able to engage with myself but he does cont to be quite disorganized and irational with lots of delusional material Pt remains grandiose as he talked about his great wealth  Insight cont to be  Limited,Mr Castillo Levin is quite paranoid still,believes his daughter is trying to get at him  Pt is however compliant and tolerating abilify 20mg with no reported side effects  The plan being to switch to the [facility] at some point  Recommended Treatment:  1-Increase  abilify 25mg daily  2-Consider a switch to the depot formulation  3- Continue with group therapy, milieu therapy and occupational therapy  Risks, benefits and possible side effects of Medications:   Risks, benefits, and possible side effects of medications explained to patient and patient verbalizes understanding        Medications:   current meds:   Current Facility-Administered Medications   Medication Dose Route Frequency    acetaminophen (TYLENOL) tablet 650 mg  650 mg Oral Q6H PRN    aluminum-magnesium hydroxide-simethicone (MYLANTA) 200-200-20 mg/5 mL oral suspension 30 mL  30 mL Oral Q4H PRN    amLODIPine (NORVASC) tablet 10 mg  10 mg Oral Daily    ARIPiprazole (ABILIFY) tablet 20 mg  20 mg Oral Daily    benztropine (COGENTIN) injection 1 mg  1 mg Intramuscular Q6H PRN    benztropine (COGENTIN) tablet 1 mg  1 mg Oral Q6H PRN    haloperidol (HALDOL) tablet 5 mg  5 mg Oral Q6H PRN    haloperidol lactate (HALDOL) injection 5 mg  5 mg Intramuscular Q6H PRN    ibuprofen (MOTRIN) tablet 600 mg  600 mg Oral Q8H PRN    lisinopril (ZESTRIL) tablet 10 mg  10 mg Oral Daily    LORazepam (ATIVAN) 2 mg/mL injection 1 mg  1 mg Intramuscular Q6H PRN    LORazepam (ATIVAN) tablet 1 mg  1 mg Oral Q6H PRN    magnesium hydroxide (MILK OF MAGNESIA) 400 mg/5 mL oral suspension 30 mL  30 mL Oral Daily PRN    nicotine (NICODERM CQ) 14 mg/24hr TD 24 hr patch 1 patch  1 patch Transdermal Daily    risperiDONE (RisperDAL M-TABS) dispersible tablet 1 mg  1 mg Oral Q3H PRN    traZODone (DESYREL) tablet 50 mg  50 mg Oral HS PRN     Labs: I have personally reviewed pt's labs    Counseling / Coordination of Care  Total floor / unit time spent today 25 minutes  Greater than 50% of total time was spent with the patient and / or family counseling and / or coordination of care   A description of the counseling / coordination of care:

## 2017-09-26 NOTE — CASE MANAGEMENT
SARA called [facility] and spoke to Myke Fields regarding the ACT referral and she said she can come to the hospital Thursday at 1300 to assess  She said the earliest [place] can be picked up for ACT is the third week of October  Phone [phone]

## 2017-09-26 NOTE — PROGRESS NOTES
Patient polite and cooperative with medications but scant and guarded in conversation  Denied symptoms and needs  Showered and shaved this morning

## 2017-09-26 NOTE — PLAN OF CARE
Problem: PSYCHOSIS  Goal: Will report no hallucinations or delusions  Interventions:  - Administer medication as  ordered  - Every waking shifts and PRN assess for the presence of hallucinations and or delusions  - Assist with reality testing to support increasing orientation  - Assess if patient's hallucinations or delusions are encouraging self-harm or harm to others and intervene as appropriate   Outcome: Progressing      Problem: INVOLUNTARY ADMIT  Goal: Will cooperate with staff recommendations and doctor's orders and will demonstrate appropriate behavior  INTERVENTIONS:  - Treat underlying conditions and offer medication as ordered  - Educate regarding involuntary admission procedures and rules  - Utilize positive consistent limit setting strategies to support patient and staff safety   Outcome: Progressing      Problem: Prexisting or High Potential for Compromised Skin Integrity  Goal: Skin integrity is maintained or improved  INTERVENTIONS:  - Identify patients at risk for skin breakdown  - Assess and monitor skin integrity  - Assess and monitor nutrition and hydration status  - Monitor labs (i e  albumin)  - Assess for incontinence   - Turn and reposition patient  - Assist with mobility/ambulation  - Relieve pressure over bony prominences  - Avoid friction and shearing  - Provide appropriate hygiene as needed including keeping skin clean and dry  - Evaluate need for skin moisturizer/barrier cream  - Collaborate with interdisciplinary team (i e  Nutrition, Rehabilitation, etc )   - Patient/family teaching   Outcome: Progressing      Problem: Ineffective Coping  Goal: Identifies ineffective coping skills  Outcome: Not Progressing  Pt scant in conversation  Goal: Identifies healthy coping skills  Outcome: Not Progressing  Pt scant in conversation  Goal: Participates in unit activities  Interventions:  - Provide therapeutic environment   - Provide required programming   - Redirect inappropriate behaviors Outcome: Progressing

## 2017-09-27 LAB
ALBUMIN SERPL BCP-MCNC: 3.4 G/DL (ref 3.5–5)
ALP SERPL-CCNC: 67 U/L (ref 46–116)
ALT SERPL W P-5'-P-CCNC: 156 U/L (ref 12–78)
ANION GAP SERPL CALCULATED.3IONS-SCNC: 7 MMOL/L (ref 4–13)
AST SERPL W P-5'-P-CCNC: 96 U/L (ref 5–45)
BILIRUB SERPL-MCNC: 0.31 MG/DL (ref 0.2–1)
BUN SERPL-MCNC: 12 MG/DL (ref 5–25)
CALCIUM SERPL-MCNC: 8.7 MG/DL (ref 8.3–10.1)
CHLORIDE SERPL-SCNC: 105 MMOL/L (ref 100–108)
CO2 SERPL-SCNC: 26 MMOL/L (ref 21–32)
CREAT SERPL-MCNC: 0.93 MG/DL (ref 0.6–1.3)
GFR SERPL CREATININE-BSD FRML MDRD: 92 ML/MIN/1.73SQ M
GLUCOSE P FAST SERPL-MCNC: 149 MG/DL (ref 65–99)
GLUCOSE SERPL-MCNC: 149 MG/DL (ref 65–140)
POTASSIUM SERPL-SCNC: 4.4 MMOL/L (ref 3.5–5.3)
PROT SERPL-MCNC: 7.8 G/DL (ref 6.4–8.2)
SODIUM SERPL-SCNC: 138 MMOL/L (ref 136–145)

## 2017-09-27 PROCEDURE — 80053 COMPREHEN METABOLIC PANEL: CPT | Performed by: INTERNAL MEDICINE

## 2017-09-27 RX ORDER — CHLORTHALIDONE 25 MG/1
25 TABLET ORAL DAILY
Status: DISCONTINUED | OUTPATIENT
Start: 2017-09-28 | End: 2017-10-15

## 2017-09-27 RX ADMIN — AMLODIPINE BESYLATE 10 MG: 10 TABLET ORAL at 08:13

## 2017-09-27 RX ADMIN — HALOPERIDOL 5 MG: 5 TABLET ORAL at 06:40

## 2017-09-27 RX ADMIN — LORAZEPAM 1 MG: 1 TABLET ORAL at 15:36

## 2017-09-27 RX ADMIN — NICOTINE 1 PATCH: 14 PATCH, EXTENDED RELEASE TRANSDERMAL at 11:20

## 2017-09-27 RX ADMIN — ALUMINUM HYDROXIDE, MAGNESIUM HYDROXIDE, AND SIMETHICONE 30 ML: 200; 200; 20 SUSPENSION ORAL at 15:35

## 2017-09-27 RX ADMIN — ARIPIPRAZOLE 25 MG: 15 TABLET ORAL at 08:14

## 2017-09-27 RX ADMIN — HALOPERIDOL 5 MG: 5 TABLET ORAL at 15:36

## 2017-09-27 RX ADMIN — BENZTROPINE MESYLATE 1 MG: 1 TABLET ORAL at 06:40

## 2017-09-27 RX ADMIN — LISINOPRIL 20 MG: 20 TABLET ORAL at 11:20

## 2017-09-27 RX ADMIN — BENZTROPINE MESYLATE 1 MG: 1 TABLET ORAL at 15:36

## 2017-09-27 NOTE — PROGRESS NOTES
Pt states "I need my things I am leaving now"  Pt was told he cannot leave  Medicated with prn haldol and cogentin  Pt went back in his bed an laying down

## 2017-09-27 NOTE — PROGRESS NOTES
Progress Note - Infectious Disease   Charline Clark 55 y o  male MRN: [id number]  Unit/Bed#: NW7 760-01 Encounter: [id number]      Impression/Recommendations:  1  Latent syphilis  Patient received 14 days of IV penicillin 2 months ago  It may still be too early to expect to see reduction of RPR titer  However, there is always the possibility a reinfection  continue with IM benzathine penicillin weekly x 3 weeks  Will continue to follow RPR titer afterwards  Continue benzathine penicillin IM weekly x 3 weeks, next dose   Follow serial RPRs afterwards      2   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  Patient completed appropriate antibiotic course for this 2 months ago  No further treatment is necessary  No further treatment or workup for this needed      3   Schizophrenia with delusional thoughts  I suspect this is all psychiatric illness and not related to syphilis  Management per Psychiatry service      Discussed with the patient in detail regarding the above plan      Antibiotics:  Benzathine penicillin x1 dose    Subjective:  Patient's mental status is unchanged  He continues to be delusional   No headache  He tolerated the 1st benzathine penicillin injection well  No further pain in gluteus  Objective:  Vitals:  HR:  [59-74] 59  Resp:  [16] 16  BP: (126-161)/(64-78) 126/64  Temp (24hrs), Av 1 °F (36 7 °C), Min:98 °F (36 7 °C), Max:98 1 °F (36 7 °C)  Current: Temperature: 98 °F (36 7 °C)    Physical Exam:     General: Awake, alert, cooperative, no distress  Lungs: Expansion symmetric, no rales, no wheezing, respirations unlabored  Heart[de-identified]  Regular rate and rhythm, S1 and S2 normal, no murmur  Abdomen: Soft, nondistended, non-tender, bowel sounds active all four quadrants,        no masses, no organomegaly  Extremities: No edema  No erythema/warmth  No ulcer  Nontender to palpation  Skin:  No rash       Invasive Devices          No matching active lines, drains, or airways          Labs studies: I have personally reviewed pertinent labs  Results from last 7 days  Lab Units 09/27/17  0652 09/26/17  0602 09/24/17  0602 09/22/17  1119   SODIUM mmol/L 138 139  --  142   POTASSIUM mmol/L 4 4 4 5  --  5 2   CHLORIDE mmol/L 105 105  --  107   CO2 mmol/L 26 27  --  31   ANION GAP mmol/L 7 7  --  4   BUN mg/dL 12 13  --  9   CREATININE mg/dL 0 93 0 95  --  0 92   EGFR ml/min/1 73sq m 92 90  --  93   GLUCOSE RANDOM mg/dL 149* 139  --  90   CALCIUM mg/dL 8 7 8 7  --  9 2   AST U/L 96* 92* 61* 92*   ALT U/L 156* 141* 129* 143*   ALK PHOS U/L 67 61 62 63   TOTAL PROTEIN g/dL 7 8 7 2 7 5 7 2   ALBUMIN g/dL 3 4* 3 2* 3 2* 3 2*   BILIRUBIN TOTAL mg/dL 0 31 0 42 0 22 0 29               Imaging Studies:   I have personally reviewed pertinent imaging study reports and images in PACS  EKG, Pathology, and Other Studies:   I have personally reviewed pertinent reports

## 2017-09-27 NOTE — PROGRESS NOTES
Progress Note - Behavioral Health   Jazmine Pisano 55 y o  male MRN: [id number]  Unit/Bed#: BL9 760-01 Encounter: [id number]    Assessment/Plan   Principal Problem:    Schizophrenia  Active Problems:    Benign essential HTN      Behavior over the last 24 hours:  unchanged  Sleep: normal  Appetite: normal  Medication side effects: No  ROS: no complaints    Mental Status Evaluation:  Appearance:  thin & gaunt looking   Behavior:  guarded   Speech:  soft   Mood:  anxious and decreased range   Affect:  flat   Thought Process:  disorganized, illogical and loose associations   Thought Content:  delusions  grandiose and persecutory   Perceptual Disturbances: denies   Risk Potential: denies   Sensorium:  person   Cognition:  grossly intact   Consciousness:  awake    Attention: attention span and concentration were age appropriate   Insight:  limited   Judgment: limited   Gait/Station: normal gait/station   Motor Activity: no abnormal movements     Progress Toward Goals: Pt was withdrawn and isolative today with limited engagement in unit milue  Staff reports that he had been agitated this morning demanding to be discharged home  insight cont to be limited as he remains remains disorganized and randiose Mr Bedolla cont to be agreeable with the plan to commence abilify maintana    Recommended Treatment:  1-For abilify Maintana 400mf X1 dose  2-Cont abilify 25mg daily for now  3- Continue with group therapy, milieu therapy and occupational therapy  Risks, benefits and possible side effects of Medications:   Risks, benefits, and possible side effects of medications explained to patient and patient verbalizes understanding        Medications:   current meds:   Current Facility-Administered Medications   Medication Dose Route Frequency    acetaminophen (TYLENOL) tablet 650 mg  650 mg Oral Q6H PRN    aluminum-magnesium hydroxide-simethicone (MYLANTA) 200-200-20 mg/5 mL oral suspension 30 mL  30 mL Oral Q4H PRN    amLODIPine (NORVASC) tablet 10 mg  10 mg Oral Daily    ARIPiprazole (ABILIFY) tablet 25 mg  25 mg Oral Daily    benztropine (COGENTIN) injection 1 mg  1 mg Intramuscular Q6H PRN    benztropine (COGENTIN) tablet 1 mg  1 mg Oral Q6H PRN    [START ON 9/28/2017] chlorthalidone tablet 25 mg  25 mg Oral Daily    haloperidol (HALDOL) tablet 5 mg  5 mg Oral Q6H PRN    haloperidol lactate (HALDOL) injection 5 mg  5 mg Intramuscular Q6H PRN    ibuprofen (MOTRIN) tablet 600 mg  600 mg Oral Q8H PRN    lisinopril (ZESTRIL) tablet 20 mg  20 mg Oral Daily    LORazepam (ATIVAN) 2 mg/mL injection 1 mg  1 mg Intramuscular Q6H PRN    LORazepam (ATIVAN) tablet 1 mg  1 mg Oral Q6H PRN    magnesium hydroxide (MILK OF MAGNESIA) 400 mg/5 mL oral suspension 30 mL  30 mL Oral Daily PRN    nicotine (NICODERM CQ) 14 mg/24hr TD 24 hr patch 1 patch  1 patch Transdermal Daily    risperiDONE (RisperDAL M-TABS) dispersible tablet 1 mg  1 mg Oral Q3H PRN    traZODone (DESYREL) tablet 50 mg  50 mg Oral HS PRN     Labs: I have personally reviewed pt's labs    Counseling / Coordination of Care  Total floor / unit time spent today 25 minutes  Greater than 50% of total time was spent with the patient and / or family counseling and / or coordination of care   A description of the counseling / coordination of care:

## 2017-09-27 NOTE — PROGRESS NOTES
Pt given PRN Haldol, Ativan and Cogentin at the beginning of shift because he stated "  The Dr told me I can leave, go to [facility] to make sure that my daughter is not stealing my SSI checks and I will take the bus and come back here "  Pt did not want to hear that he was not being d/c today and was demanding still to leave  Pt responded well to PRN and is now sitting quietly in day room watching TV

## 2017-09-27 NOTE — PLAN OF CARE
Problem: PSYCHOSIS  Goal: Will report no hallucinations or delusions  Interventions:  - Administer medication as  ordered  - Every waking shifts and PRN assess for the presence of hallucinations and or delusions  - Assist with reality testing to support increasing orientation  - Assess if patient's hallucinations or delusions are encouraging self-harm or harm to others and intervene as appropriate   Outcome: Progressing

## 2017-09-28 LAB
ANION GAP SERPL CALCULATED.3IONS-SCNC: 5 MMOL/L (ref 4–13)
BUN SERPL-MCNC: 14 MG/DL (ref 5–25)
CALCIUM SERPL-MCNC: 8.9 MG/DL (ref 8.3–10.1)
CHLORIDE SERPL-SCNC: 102 MMOL/L (ref 100–108)
CO2 SERPL-SCNC: 28 MMOL/L (ref 21–32)
CREAT SERPL-MCNC: 1.04 MG/DL (ref 0.6–1.3)
GFR SERPL CREATININE-BSD FRML MDRD: 80 ML/MIN/1.73SQ M
GLUCOSE P FAST SERPL-MCNC: 130 MG/DL (ref 65–99)
GLUCOSE SERPL-MCNC: 130 MG/DL (ref 65–140)
POTASSIUM SERPL-SCNC: 5.2 MMOL/L (ref 3.5–5.3)
SODIUM SERPL-SCNC: 135 MMOL/L (ref 136–145)

## 2017-09-28 PROCEDURE — 80048 BASIC METABOLIC PNL TOTAL CA: CPT | Performed by: INTERNAL MEDICINE

## 2017-09-28 RX ADMIN — AMLODIPINE BESYLATE 10 MG: 10 TABLET ORAL at 10:08

## 2017-09-28 RX ADMIN — NICOTINE 1 PATCH: 14 PATCH, EXTENDED RELEASE TRANSDERMAL at 08:21

## 2017-09-28 RX ADMIN — LISINOPRIL 20 MG: 20 TABLET ORAL at 10:07

## 2017-09-28 RX ADMIN — CHLORTHALIDONE 25 MG: 25 TABLET ORAL at 10:45

## 2017-09-28 RX ADMIN — ARIPIPRAZOLE 25 MG: 15 TABLET ORAL at 08:18

## 2017-09-28 NOTE — CASE MANAGEMENT
Pt is pleasant in demeanor, had visit by Bing Lin from [facility] and pt was appropriate and calm during her assessment  She states she will take the information "back to her supervisor" to see if the patient is appropriate  She voiced concern that the patient may be too ill for them or too chronic which I find non-sensical because the pt is not aggressive and was open to the ACT team and the purpose of the ACT team is to help chronically ill patients

## 2017-09-28 NOTE — PROGRESS NOTES
Progress Note - Behavioral Health   Naseem Castaneda 55 y o  male MRN: [id number]  Unit/Bed#: MZ7 760-01 Encounter: [id number]    Assessment/Plan   Principal Problem:    Schizophrenia  Active Problems:    Benign essential HTN      Behavior over the last 24 hours:  unchanged  Sleep: normal  Appetite: normal  Medication side effects: No  ROS: no complaints    Mental Status Evaluation:  Appearance:  thin & gaunt looking   Behavior:  guarded   Speech:  soft   Mood:  anxious and decreased range   Affect:  flat   Thought Process:  disorganized, illogical and loose associations   Thought Content:  delusions  grandiose and persecutory   Perceptual Disturbances: denies   Risk Potential: denies   Sensorium:  person   Cognition:  grossly intact   Consciousness:  awake    Attention: attention span and concentration were age appropriate   Insight:  limited   Judgment: limited   Gait/Station: normal gait/station   Motor Activity: no abnormal movements     Progress Toward Goals: Pt seenthis afternoon in individualsession,less paranoid and more organized as he is better able to engage with myself  Pt has been engaged in unit milue the patient does cont to delusional -paranoid and grandiose but this is improved    Recommended Treatment:  1-For abilify Maintana 400mf X1 dose starting tomorrow  2-Cont abilify 25mg daily for now  3- Continue with group therapy, milieu therapy and occupational therapy  Risks, benefits and possible side effects of Medications:   Risks, benefits, and possible side effects of medications explained to patient and patient verbalizes understanding        Medications:   current meds:   Current Facility-Administered Medications   Medication Dose Route Frequency    acetaminophen (TYLENOL) tablet 650 mg  650 mg Oral Q6H PRN    aluminum-magnesium hydroxide-simethicone (MYLANTA) 200-200-20 mg/5 mL oral suspension 30 mL  30 mL Oral Q4H PRN    amLODIPine (NORVASC) tablet 10 mg  10 mg Oral Daily    ARIPiprazole (ABILIFY) tablet 25 mg  25 mg Oral Daily    benztropine (COGENTIN) injection 1 mg  1 mg Intramuscular Q6H PRN    benztropine (COGENTIN) tablet 1 mg  1 mg Oral Q6H PRN    chlorthalidone tablet 25 mg  25 mg Oral Daily    haloperidol (HALDOL) tablet 5 mg  5 mg Oral Q6H PRN    haloperidol lactate (HALDOL) injection 5 mg  5 mg Intramuscular Q6H PRN    ibuprofen (MOTRIN) tablet 600 mg  600 mg Oral Q8H PRN    lisinopril (ZESTRIL) tablet 20 mg  20 mg Oral Daily    LORazepam (ATIVAN) 2 mg/mL injection 1 mg  1 mg Intramuscular Q6H PRN    LORazepam (ATIVAN) tablet 1 mg  1 mg Oral Q6H PRN    magnesium hydroxide (MILK OF MAGNESIA) 400 mg/5 mL oral suspension 30 mL  30 mL Oral Daily PRN    nicotine (NICODERM CQ) 14 mg/24hr TD 24 hr patch 1 patch  1 patch Transdermal Daily    [START ON 9/29/2017] NON FORMULARY  400 mg Intramuscular Q30 Days    [START ON 9/29/2017] penicillin G benzathine (BICILLIN L-A) intramuscular injection 2 4 Million Units  2 4 Million Units Intramuscular Once    risperiDONE (RisperDAL M-TABS) dispersible tablet 1 mg  1 mg Oral Q3H PRN    traZODone (DESYREL) tablet 50 mg  50 mg Oral HS PRN     Labs: I have personally reviewed pt's labs    Counseling / Coordination of Care  Total floor / unit time spent today 25 minutes  Greater than 50% of total time was spent with the patient and / or family counseling and / or coordination of care   A description of the counseling / coordination of care:

## 2017-09-28 NOTE — PROGRESS NOTES
Pt has been out in the milieu and interact with select peers  Pt is superficial on interaction  No delusional material noted

## 2017-09-28 NOTE — PROGRESS NOTES
IM Residency CONSULT Progress Note   Unit/Bed#: NW7 760-01 Encounter: [id number]  SOD Team C       Miri Church 55 y o  male [address] Stay Days: 10      Assessment/Plan:    Principal Problem:    Schizophrenia  Active Problems:    Benign essential HTN    Hypertension  Patient with improved blood pressure control since addition of chlorthalidone to BP regimen of amlodipine and lisinopril   -Continue current regimen and have patient follow up with PCP following discharge    Disposition: Will sign off from medical perspective  Thank you for allowing us to participate in the patient's care and do not hesitate to call if any further questions arise  Subjective:   Patient seen and examined in bed this afternoon  Patient states that he is feeling well and eating well without any issues  Denied headache, fever/chills, n/v, abd pain, c/p, SOB, palpitations, diarrhea  Vitals: Temp (24hrs), Av 1 °F (36 7 °C), Min:98 °F (36 7 °C), Max:98 2 °F (36 8 °C)  Current: Temperature: 98 2 °F (36 8 °C)  Vitals:    17 1533 17 0719 17 1002 17 1007   BP: 126/64 115/59 144/72 144/72   Pulse: 59 56 80    Resp: 16 16     Temp: 98 °F (36 7 °C) 98 2 °F (36 8 °C)     TempSrc: Oral Oral     Weight:       Height:        Body mass index is 20 05 kg/m²  No intake/output data recorded        Physical Exam: /72 Comment: pulse 80  Pulse 80   Temp 98 2 °F (36 8 °C) (Oral)   Resp 16   Ht 6' 2" (1 88 m)   Wt 70 9 kg (156 lb 3 2 oz)   BMI 20 05 kg/m²     General Appearance:    Alert, cooperative, no distress, appears stated age   Head:    Normocephalic, without obvious abnormality, atraumatic   Throat:   Lips, mucosa, and tongue normal; teeth and gums normal   Neck:   Supple, symmetrical, trachea midline, no adenopathy   Back:     Symmetric, no curvature, ROM normal   Lungs:     Clear to auscultation bilaterally, respirations unlabored   Chest wall:    No tenderness or deformity   Heart: Regular rate and rhythm, S1 and S2 normal   Abdomen:     Soft, non-tender, bowel sounds active all four quadrants   Extremities:   Extremities normal, atraumatic, no cyanosis or edema   Pulses:   2+ and symmetric all extremities   Skin:   Skin color, texture, turgor normal, no rashes or lesions   Lymph nodes:   Cervical, supraclavicular, and axillary nodes normal   Neurologic:   Answering questions appropriately, fluent speech, aaox3        Invasive Devices          No matching active lines, drains, or airways                    Labs:   Recent Results (from the past 24 hour(s))   Basic metabolic panel    Collection Time: 09/28/17  6:12 AM   Result Value Ref Range    Sodium 135 (L) 136 - 145 mmol/L    Potassium 5 2 3 5 - 5 3 mmol/L    Chloride 102 100 - 108 mmol/L    CO2 28 21 - 32 mmol/L    Anion Gap 5 4 - 13 mmol/L    BUN 14 5 - 25 mg/dL    Creatinine 1 04 0 60 - 1 30 mg/dL    Glucose 130 65 - 140 mg/dL    Glucose, Fasting 130 (H) 65 - 99 mg/dL    Calcium 8 9 8 3 - 10 1 mg/dL    eGFR 80 ml/min/1 73sq m       Radiology Results: I have personally reviewed pertinent reports  Other Diagnostic Testing:   I have personally reviewed pertinent reports          Active Meds:   Current Facility-Administered Medications   Medication Dose Route Frequency    acetaminophen (TYLENOL) tablet 650 mg  650 mg Oral Q6H PRN    aluminum-magnesium hydroxide-simethicone (MYLANTA) 200-200-20 mg/5 mL oral suspension 30 mL  30 mL Oral Q4H PRN    amLODIPine (NORVASC) tablet 10 mg  10 mg Oral Daily    ARIPiprazole (ABILIFY) tablet 25 mg  25 mg Oral Daily    benztropine (COGENTIN) injection 1 mg  1 mg Intramuscular Q6H PRN    benztropine (COGENTIN) tablet 1 mg  1 mg Oral Q6H PRN    chlorthalidone tablet 25 mg  25 mg Oral Daily    haloperidol (HALDOL) tablet 5 mg  5 mg Oral Q6H PRN    haloperidol lactate (HALDOL) injection 5 mg  5 mg Intramuscular Q6H PRN    ibuprofen (MOTRIN) tablet 600 mg  600 mg Oral Q8H PRN    lisinopril (ZESTRIL) tablet 20 mg  20 mg Oral Daily    LORazepam (ATIVAN) 2 mg/mL injection 1 mg  1 mg Intramuscular Q6H PRN    LORazepam (ATIVAN) tablet 1 mg  1 mg Oral Q6H PRN    magnesium hydroxide (MILK OF MAGNESIA) 400 mg/5 mL oral suspension 30 mL  30 mL Oral Daily PRN    nicotine (NICODERM CQ) 14 mg/24hr TD 24 hr patch 1 patch  1 patch Transdermal Daily    [START ON 9/29/2017] penicillin G benzathine (BICILLIN L-A) intramuscular injection 2 4 Million Units  2 4 Million Units Intramuscular Once    risperiDONE (RisperDAL M-TABS) dispersible tablet 1 mg  1 mg Oral Q3H PRN    traZODone (DESYREL) tablet 50 mg  50 mg Oral HS PRN       Eileen Ryder MD

## 2017-09-28 NOTE — CASE MANAGEMENT
Pt is pleasant, superficial, has some persistent delusions, does want to be discharged but is not agitated at this point in time  Left a message for his  and landlord Kris Calderon to keep her updated; she continues to hold his room for him so that he has a place to live when he is discharge

## 2017-09-29 RX ADMIN — AMLODIPINE BESYLATE 10 MG: 10 TABLET ORAL at 08:19

## 2017-09-29 RX ADMIN — NICOTINE 1 PATCH: 14 PATCH, EXTENDED RELEASE TRANSDERMAL at 08:20

## 2017-09-29 RX ADMIN — TRAZODONE HYDROCHLORIDE 50 MG: 50 TABLET ORAL at 21:00

## 2017-09-29 RX ADMIN — PENICILLIN G BENZATHINE 2.4 MILLION UNITS: 1200000 INJECTION, SUSPENSION INTRAMUSCULAR at 13:18

## 2017-09-29 RX ADMIN — CHLORTHALIDONE 25 MG: 25 TABLET ORAL at 08:20

## 2017-09-29 RX ADMIN — LISINOPRIL 20 MG: 20 TABLET ORAL at 08:19

## 2017-09-29 RX ADMIN — ARIPIPRAZOLE 25 MG: 15 TABLET ORAL at 08:19

## 2017-09-29 NOTE — PROGRESS NOTES
Pt is quiet and tends to keep to himself  Remains delusional  Pt thinks that the police" know who he is" so everything is ok   [place] SI  Denies hallucinations

## 2017-09-29 NOTE — PROGRESS NOTES
Progress Note - Behavioral Health   Wilton Martines 55 y o  male MRN: [id number]  Unit/Bed#: EU9 760-01 Encounter: [id number]    Assessment/Plan   Principal Problem:    Schizophrenia  Active Problems:    Benign essential HTN      Behavior over the last 24 hours:  unchanged  Sleep: normal  Appetite: normal  Medication side effects: No  ROS: no complaints    Mental Status Evaluation:  Appearance:  thin & gaunt looking   Behavior:  guarded   Speech:  soft   Mood:  anxious and decreased range   Affect:  flat   Thought Process:  disorganized, illogical and loose associations   Thought Content:  delusions  grandiose and persecutory   Perceptual Disturbances: denies   Risk Potential: denies   Sensorium:  person   Cognition:  grossly intact   Consciousness:  awake    Attention: attention span and concentration were age appropriate   Insight:  limited   Judgment: limited   Gait/Station: normal gait/station   Motor Activity: no abnormal movements     Progress Toward Goals: Pt seen this morning in individual session,does report feeling low with some anhedonia but feels a lot more positive at this time and more organized  Pt cont to respond to abilify with no issues  Pt to receive first dose of abilify maintana 400mg this morning  Pt has been engaged in unit mil with no behavioral issues  Recommended Treatment:  1-For abilify Maintana 400mf X1 dose starting tomorrow  2-Cont abilify 25mg daily for now  3- Continue with group therapy, milieu therapy and occupational therapy  Risks, benefits and possible side effects of Medications:   Risks, benefits, and possible side effects of medications explained to patient and patient verbalizes understanding        Medications:   current meds:   Current Facility-Administered Medications   Medication Dose Route Frequency    acetaminophen (TYLENOL) tablet 650 mg  650 mg Oral Q6H PRN    aluminum-magnesium hydroxide-simethicone (MYLANTA) 200-200-20 mg/5 mL oral suspension 30 mL  30 mL Oral Q4H PRN    amLODIPine (NORVASC) tablet 10 mg  10 mg Oral Daily    ARIPiprazole (ABILIFY) tablet 25 mg  25 mg Oral Daily    benztropine (COGENTIN) injection 1 mg  1 mg Intramuscular Q6H PRN    benztropine (COGENTIN) tablet 1 mg  1 mg Oral Q6H PRN    chlorthalidone tablet 25 mg  25 mg Oral Daily    haloperidol (HALDOL) tablet 5 mg  5 mg Oral Q6H PRN    haloperidol lactate (HALDOL) injection 5 mg  5 mg Intramuscular Q6H PRN    ibuprofen (MOTRIN) tablet 600 mg  600 mg Oral Q8H PRN    lisinopril (ZESTRIL) tablet 20 mg  20 mg Oral Daily    LORazepam (ATIVAN) 2 mg/mL injection 1 mg  1 mg Intramuscular Q6H PRN    LORazepam (ATIVAN) tablet 1 mg  1 mg Oral Q6H PRN    magnesium hydroxide (MILK OF MAGNESIA) 400 mg/5 mL oral suspension 30 mL  30 mL Oral Daily PRN    nicotine (NICODERM CQ) 14 mg/24hr TD 24 hr patch 1 patch  1 patch Transdermal Daily    NON FORMULARY  400 mg Intramuscular Q30 Days    penicillin G benzathine (BICILLIN L-A) intramuscular injection 2 4 Million Units  2 4 Million Units Intramuscular Once    risperiDONE (RisperDAL M-TABS) dispersible tablet 1 mg  1 mg Oral Q3H PRN    traZODone (DESYREL) tablet 50 mg  50 mg Oral HS PRN     Labs: I have personally reviewed pt's labs    Counseling / Coordination of Care  Total floor / unit time spent today 25 minutes  Greater than 50% of total time was spent with the patient and / or family counseling and / or coordination of care   A description of the counseling / coordination of care:

## 2017-09-29 NOTE — PLAN OF CARE
Problem: PSYCHOSIS  Goal: Will report no hallucinations or delusions  Interventions:  - Administer medication as  ordered  - Every waking shifts and PRN assess for the presence of hallucinations and or delusions  - Assist with reality testing to support increasing orientation  - Assess if patient's hallucinations or delusions are encouraging self-harm or harm to others and intervene as appropriate   Outcome: Progressing      Problem: INVOLUNTARY ADMIT  Goal: Will cooperate with staff recommendations and doctor's orders and will demonstrate appropriate behavior  INTERVENTIONS:  - Treat underlying conditions and offer medication as ordered  - Educate regarding involuntary admission procedures and rules  - Utilize positive consistent limit setting strategies to support patient and staff safety   Outcome: Progressing      Problem: Prexisting or High Potential for Compromised Skin Integrity  Goal: Skin integrity is maintained or improved  INTERVENTIONS:  - Identify patients at risk for skin breakdown  - Assess and monitor skin integrity  - Assess and monitor nutrition and hydration status  - Monitor labs (i e  albumin)  - Assess for incontinence   - Turn and reposition patient  - Assist with mobility/ambulation  - Relieve pressure over bony prominences  - Avoid friction and shearing  - Provide appropriate hygiene as needed including keeping skin clean and dry  - Evaluate need for skin moisturizer/barrier cream  - Collaborate with interdisciplinary team (i e  Nutrition, Rehabilitation, etc )   - Patient/family teaching   Outcome: Progressing      Problem: Ineffective Coping  Goal: Identifies ineffective coping skills  Outcome: Progressing    Goal: Identifies healthy coping skills  Outcome: Progressing    Goal: Participates in unit activities  Interventions:  - Provide therapeutic environment   - Provide required programming   - Redirect inappropriate behaviors    Outcome: Progressing      Problem: DISCHARGE PLANNING  Goal: Discharge to home or other facility with appropriate resources  INTERVENTIONS:  - Identify barriers to discharge w/patient and caregiver  - Arrange for needed discharge resources and transportation as appropriate  - Identify discharge learning needs (meds, wound care, etc )  - Arrange for interpretive services to assist at discharge as needed  - Refer to Case Management Department for coordinating discharge planning if the patient needs post-hospital services based on physician/advanced practitioner order or complex needs related to functional status, cognitive ability, or social support system   Outcome: Progressing
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
## 2017-09-29 NOTE — PLAN OF CARE
Problem: PSYCHOSIS  Goal: Will report no hallucinations or delusions  Interventions:  - Administer medication as  ordered  - Every waking shifts and PRN assess for the presence of hallucinations and or delusions  - Assist with reality testing to support increasing orientation  - Assess if patient's hallucinations or delusions are encouraging self-harm or harm to others and intervene as appropriate   Outcome: Not Progressing  Patient still has delusion of having lots of money and believing his daughter stole it all from him      Problem: INVOLUNTARY ADMIT  Goal: Will cooperate with staff recommendations and doctor's orders and will demonstrate appropriate behavior  INTERVENTIONS:  - Treat underlying conditions and offer medication as ordered  - Educate regarding involuntary admission procedures and rules  - Utilize positive consistent limit setting strategies to support patient and staff safety   Outcome: Progressing      Problem: Prexisting or High Potential for Compromised Skin Integrity  Goal: Skin integrity is maintained or improved  INTERVENTIONS:  - Identify patients at risk for skin breakdown  - Assess and monitor skin integrity  - Assess and monitor nutrition and hydration status  - Monitor labs (i e  albumin)  - Assess for incontinence   - Turn and reposition patient  - Assist with mobility/ambulation  - Relieve pressure over bony prominences  - Avoid friction and shearing  - Provide appropriate hygiene as needed including keeping skin clean and dry  - Evaluate need for skin moisturizer/barrier cream  - Collaborate with interdisciplinary team (i e  Nutrition, Rehabilitation, etc )   - Patient/family teaching   Outcome: Progressing      Problem: Ineffective Coping  Goal: Identifies ineffective coping skills  Outcome: Progressing    Goal: Identifies healthy coping skills  Outcome: Progressing    Goal: Participates in unit activities  Interventions:  - Provide therapeutic environment   - Provide required programming   - Redirect inappropriate behaviors    Outcome: Progressing      Problem: DISCHARGE PLANNING  Goal: Discharge to home or other facility with appropriate resources  INTERVENTIONS:  - Identify barriers to discharge w/patient and caregiver  - Arrange for needed discharge resources and transportation as appropriate  - Identify discharge learning needs (meds, wound care, etc )  - Arrange for interpretive services to assist at discharge as needed  - Refer to Case Management Department for coordinating discharge planning if the patient needs post-hospital services based on physician/advanced practitioner order or complex needs related to functional status, cognitive ability, or social support system   Outcome: Progressing

## 2017-09-29 NOTE — CASE MANAGEMENT
Script for Maria Teresa Styles taken down to [facility] and it was filled with no issues as per [facility] (sent to inpt pharmacy)   Erick Fears from [facility] called and stated they want more information regarding his medical condition and that "what if he decompensates and needs a nursing home, what are they supposed to do "

## 2017-09-30 RX ADMIN — LISINOPRIL 20 MG: 20 TABLET ORAL at 08:46

## 2017-09-30 RX ADMIN — TRAZODONE HYDROCHLORIDE 50 MG: 50 TABLET ORAL at 21:43

## 2017-09-30 RX ADMIN — AMLODIPINE BESYLATE 10 MG: 10 TABLET ORAL at 08:24

## 2017-09-30 RX ADMIN — CHLORTHALIDONE 25 MG: 25 TABLET ORAL at 08:24

## 2017-09-30 RX ADMIN — ARIPIPRAZOLE 25 MG: 15 TABLET ORAL at 08:24

## 2017-09-30 RX ADMIN — NICOTINE 1 PATCH: 14 PATCH, EXTENDED RELEASE TRANSDERMAL at 08:25
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
## 2017-09-30 NOTE — PROGRESS NOTES
Abilify Maintenna 400mgs IM given this afternoon - said he is happy to be getting something that will make it easier to keep taking his medications  He has been in the milieu all evening, in TV room, some interaction with select peers  He requested sleeping medication and received Trazadone 50mgs po prn

## 2017-09-30 NOTE — PROGRESS NOTES
Progress Note - Behavioral Health   Mayte Hall 55 y o  male MRN: [id number]  Unit/Bed#: NU2 760-01 Encounter: [id number]    Assessment/Plan   Principal Problem:    Schizophrenia  Active Problems:    Benign essential HTN      Behavior over the last 24 hours:  improved  Sleep: normal  Appetite: normal  Medication side effects: No  ROS: no complaints    Mental Status Evaluation:  Appearance:  age appropriate and casually dressed   Behavior:  normal   Speech:  normal pitch and normal volume   Mood:  constricted   Affect:  constricted   Thought Process:  circumstantial   Thought Content:  delusions  persecutory and grandiose   Perceptual Disturbances: None   Risk Potential: Suicidal Ideations none, Homicidal Ideations none and Potential for Aggression No   Sensorium:  person, place and time/date   Cognition:  grossly intact   Consciousness:  alert and awake    Attention: attention span and concentration were age appropriate   Insight:  limited   Judgment: limited   Gait/Station: normal gait/station and normal balance   Motor Activity: no abnormal movements     Progress Toward Goals: PATIENT IS RESPONDING WELL TO Abilify and received first long acting injection yesterday  Denies medication side effects  Recommended Treatment: Continue with group therapy, milieu therapy and occupational therapy  Risks, benefits and possible side effects of Medications:   Risks, benefits, and possible side effects of medications explained to patient and patient verbalizes understanding        Medications:   all current active meds have been reviewed, continue current psychiatric medications and current meds:   Current Facility-Administered Medications   Medication Dose Route Frequency    acetaminophen (TYLENOL) tablet 650 mg  650 mg Oral Q6H PRN    aluminum-magnesium hydroxide-simethicone (MYLANTA) 200-200-20 mg/5 mL oral suspension 30 mL  30 mL Oral Q4H PRN    amLODIPine (NORVASC) tablet 10 mg  10 mg Oral Daily    ARIPiprazole (ABILIFY) tablet 25 mg  25 mg Oral Daily    benztropine (COGENTIN) injection 1 mg  1 mg Intramuscular Q6H PRN    benztropine (COGENTIN) tablet 1 mg  1 mg Oral Q6H PRN    chlorthalidone tablet 25 mg  25 mg Oral Daily    haloperidol (HALDOL) tablet 5 mg  5 mg Oral Q6H PRN    haloperidol lactate (HALDOL) injection 5 mg  5 mg Intramuscular Q6H PRN    ibuprofen (MOTRIN) tablet 600 mg  600 mg Oral Q8H PRN    lisinopril (ZESTRIL) tablet 20 mg  20 mg Oral Daily    LORazepam (ATIVAN) 2 mg/mL injection 1 mg  1 mg Intramuscular Q6H PRN    LORazepam (ATIVAN) tablet 1 mg  1 mg Oral Q6H PRN    magnesium hydroxide (MILK OF MAGNESIA) 400 mg/5 mL oral suspension 30 mL  30 mL Oral Daily PRN    nicotine (NICODERM CQ) 14 mg/24hr TD 24 hr patch 1 patch  1 patch Transdermal Daily    risperiDONE (RisperDAL M-TABS) dispersible tablet 1 mg  1 mg Oral Q3H PRN    traZODone (DESYREL) tablet 50 mg  50 mg Oral HS PRN     Labs: reviewed    Counseling / Coordination of Care  Total floor / unit time spent today  minutes  Greater than 50% of total time was spent with the patient and / or family counseling and / or coordination of care   A description of the counseling / coordination of care:

## 2017-10-01 RX ADMIN — TRAZODONE HYDROCHLORIDE 50 MG: 50 TABLET ORAL at 21:29

## 2017-10-01 RX ADMIN — AMLODIPINE BESYLATE 10 MG: 10 TABLET ORAL at 08:30

## 2017-10-01 RX ADMIN — LISINOPRIL 20 MG: 20 TABLET ORAL at 08:30

## 2017-10-01 RX ADMIN — CHLORTHALIDONE 25 MG: 25 TABLET ORAL at 08:30

## 2017-10-01 RX ADMIN — ARIPIPRAZOLE 25 MG: 15 TABLET ORAL at 08:30

## 2017-10-01 RX ADMIN — NICOTINE 1 PATCH: 14 PATCH, EXTENDED RELEASE TRANSDERMAL at 08:33

## 2017-10-01 NOTE — PROGRESS NOTES
Continues delusional  He is in the milieu and sits in the company of peers watching TV  Little interaction with them however  He asks for sleeping medication each evening - Trazadone 50mgs po prn given and he insists he sleeps poorly "It was after 3am when I got to sleep last night" but that is not what night staff observe or report
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
## 2017-10-01 NOTE — PLAN OF CARE
Problem: PSYCHOSIS  Goal: Will report no hallucinations or delusions  Interventions:  - Administer medication as  ordered  - Every waking shifts and PRN assess for the presence of hallucinations and or delusions  - Assist with reality testing to support increasing orientation  - Assess if patient's hallucinations or delusions are encouraging self-harm or harm to others and intervene as appropriate   Outcome: Not Progressing  Patient is delusional     Problem: INVOLUNTARY ADMIT  Goal: Will cooperate with staff recommendations and doctor's orders and will demonstrate appropriate behavior  INTERVENTIONS:  - Treat underlying conditions and offer medication as ordered  - Educate regarding involuntary admission procedures and rules  - Utilize positive consistent limit setting strategies to support patient and staff safety   Outcome: Progressing      Problem: Prexisting or High Potential for Compromised Skin Integrity  Goal: Skin integrity is maintained or improved  INTERVENTIONS:  - Identify patients at risk for skin breakdown  - Assess and monitor skin integrity  - Assess and monitor nutrition and hydration status  - Monitor labs (i e  albumin)  - Assess for incontinence   - Turn and reposition patient  - Assist with mobility/ambulation  - Relieve pressure over bony prominences  - Avoid friction and shearing  - Provide appropriate hygiene as needed including keeping skin clean and dry  - Evaluate need for skin moisturizer/barrier cream  - Collaborate with interdisciplinary team (i e  Nutrition, Rehabilitation, etc )   - Patient/family teaching   Outcome: Progressing      Problem: Ineffective Coping  Goal: Identifies ineffective coping skills  Outcome: Progressing    Goal: Identifies healthy coping skills  Outcome: Progressing    Goal: Participates in unit activities  Interventions:  - Provide therapeutic environment   - Provide required programming   - Redirect inappropriate behaviors    Outcome: Progressing      Problem: DISCHARGE PLANNING  Goal: Discharge to home or other facility with appropriate resources  INTERVENTIONS:  - Identify barriers to discharge w/patient and caregiver  - Arrange for needed discharge resources and transportation as appropriate  - Identify discharge learning needs (meds, wound care, etc )  - Arrange for interpretive services to assist at discharge as needed  - Refer to Case Management Department for coordinating discharge planning if the patient needs post-hospital services based on physician/advanced practitioner order or complex needs related to functional status, cognitive ability, or social support system   Outcome: Progressing

## 2017-10-02 RX ADMIN — LISINOPRIL 20 MG: 20 TABLET ORAL at 08:04

## 2017-10-02 RX ADMIN — ARIPIPRAZOLE 25 MG: 15 TABLET ORAL at 08:04

## 2017-10-02 RX ADMIN — CHLORTHALIDONE 25 MG: 25 TABLET ORAL at 08:04

## 2017-10-02 RX ADMIN — AMLODIPINE BESYLATE 10 MG: 10 TABLET ORAL at 08:03

## 2017-10-02 RX ADMIN — NICOTINE 1 PATCH: 14 PATCH, EXTENDED RELEASE TRANSDERMAL at 08:04

## 2017-10-02 NOTE — CASE MANAGEMENT
CM spoke to [employer] and they asked CM to fax any clinical that is medical in order for their psychiatrist to review  CM faxed H&P and consult progress note and infectious disease progress note
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
## 2017-10-02 NOTE — PROGRESS NOTES
Progress Note - Behavioral Health   Dani Ramirez 55 y o  male MRN: @MRN   Unit/Bed#: GX7 760-01 Encounter: [id number]        The patient was seen for continuing care and reviewed with staff  Report from staff regarding this patient received and discussed, and records reviewed prior to seeing this patient   The patient stated that he was doing better but still depressed 5/10  He admitted that he had Jerie Kil a provided to him but still feels depressed  Behavior over the last 24 hours: slowly improving  Patient remains appropriate, calm, delusional, depressed and disheveled today  Sleep: improved  Appetite: improving  Medication side effects: No   ROS: no complaints    Mental Status Evaluation:    Appearance:  dressed in hospital attire, looks older than stated age, underweight   Behavior:  cooperative, guarded   Speech:  decreased rate, scant, increased latency of response, delayed, decreased volume   Mood:  depressed, dysphoric   Affect:  constricted   Thought Process:  decreased rate of thoughts, slowing of thoughts, negative thinking, impaired abstract reasoning   Thought Content:  no overt delusions   Perceptual Disturbances: no auditory hallucinations, denies auditory hallucinations when asked, does not appear responding to internal stimuli   Risk Potential: Suicidal ideation - None at present   Sensorium:  oriented to person, place and time/date   Cognition:  recent and remote memory grossly intact   Consciousness:  alert and awake    Attention: attention span and concentration are age appropriate   Insight:  impaired   Judgment: impaired   Gait/Station: normal gait/station and normal balance   Motor Activity: no abnormal movements         Laboratory results:  I have personally reviewed all pertinent laboratory results      Progress Toward Goals: minimal improvement    Assessment/Plan   Principal Problem:    Schizophrenia  Active Problems:    Benign essential HTN      Recommended Treatment: Will continue the same medications today, will observe the patient has 2 more  May consider increase Abilify to 30 mg daily        Current Facility-Administered Medications:     acetaminophen (TYLENOL) tablet 650 mg, 650 mg, Oral, Q6H PRN, Lima Freedman MD    aluminum-magnesium hydroxide-simethicone (MYLANTA) 200-200-20 mg/5 mL oral suspension 30 mL, 30 mL, Oral, Q4H PRN, Lima Freedman MD, 30 mL at 09/27/17 1535    amLODIPine (NORVASC) tablet 10 mg, 10 mg, Oral, Daily, Prem Engel DO, 10 mg at 10/02/17 0803    ARIPiprazole (ABILIFY) tablet 25 mg, 25 mg, Oral, Daily, Sherryle Field, MD, 25 mg at 10/02/17 0804    benztropine (COGENTIN) injection 1 mg, 1 mg, Intramuscular, Q6H PRN, Lima Freedman MD    benztropine (COGENTIN) tablet 1 mg, 1 mg, Oral, Q6H PRN, Lima Freedman MD, 1 mg at 09/27/17 1536    chlorthalidone tablet 25 mg, 25 mg, Oral, Daily, Tatyana Bowen DO, 25 mg at 10/02/17 0804    haloperidol (HALDOL) tablet 5 mg, 5 mg, Oral, Q6H PRN, Lima Freedman MD, 5 mg at 09/27/17 1536    haloperidol lactate (HALDOL) injection 5 mg, 5 mg, Intramuscular, Q6H PRN, Lima Freedman MD    ibuprofen (MOTRIN) tablet 600 mg, 600 mg, Oral, Q8H PRN, Lima Freedman MD    lisinopril (ZESTRIL) tablet 20 mg, 20 mg, Oral, Daily, Tatyana Bowen DO, 20 mg at 10/02/17 0804    LORazepam (ATIVAN) 2 mg/mL injection 1 mg, 1 mg, Intramuscular, Q6H PRN, Lima Freedman MD    LORazepam (ATIVAN) tablet 1 mg, 1 mg, Oral, Q6H PRN, Lima Freedman MD, 1 mg at 09/27/17 1536    magnesium hydroxide (MILK OF MAGNESIA) 400 mg/5 mL oral suspension 30 mL, 30 mL, Oral, Daily PRN, Lima Freedman MD    nicotine (NICODERM CQ) 14 mg/24hr TD 24 hr patch 1 patch, 1 patch, Transdermal, Daily, Lima Freedman MD, 1 patch at 10/02/17 0804    risperiDONE (RisperDAL M-TABS) dispersible tablet 1 mg, 1 mg, Oral, Q3H PRN, Lima Freedman MD    traZODone (DESYREL) tablet 50 mg, 50 mg, Oral, HS PRN, Lima Freedman MD, 50 mg at 10/01/17 2129    Planned medication and treatment changes: All current active medications have been reviewed  Continue treatment with group therapy, milieu therapy, occupational therapy and medication management  Risks / Benefits of Treatment:    Risks, benefits, and possible side effects of medications explained to patient including risk of parkinsonian symptoms, Tardive Dyskinesia and metabolic syndrome related to treatment with antipsychotic medications  The patient verbalizes understanding and agreement for treatment  Counseling / Coordination of Care:    Patient's progress discussed with staff in treatment team meeting  Medications, treatment progress and treatment plan reviewed with patient  Medication education provided to patient  Supportive therapy provided to patient  Coping skills reviewed with patient  Portions of the record may have been created with voice recognition software  Occasional wrong word or "sound a like" substitutions may have occurred due to the inherent limitations of voice recognition software  Read the chart carefully and recognize, using context, where substitutions have occurred  There may be translation, syntax,  or grammatical errors  If you have any questions, please contact the dictating provider

## 2017-10-03 RX ADMIN — ARIPIPRAZOLE 25 MG: 15 TABLET ORAL at 08:11

## 2017-10-03 RX ADMIN — AMLODIPINE BESYLATE 10 MG: 10 TABLET ORAL at 08:12

## 2017-10-03 RX ADMIN — CHLORTHALIDONE 25 MG: 25 TABLET ORAL at 08:12

## 2017-10-03 RX ADMIN — NICOTINE 1 PATCH: 14 PATCH, EXTENDED RELEASE TRANSDERMAL at 08:12

## 2017-10-03 RX ADMIN — LISINOPRIL 20 MG: 20 TABLET ORAL at 08:11

## 2017-10-03 NOTE — CASE MANAGEMENT
CM called [facility] to follow up with ACT referral   ACT said Billy Diaz is not in the office but they will give her the message  Phone [phone]

## 2017-10-03 NOTE — PROGRESS NOTES
Progress Note - Behavioral Health   Chelsey Reese 55 y o  male MRN: [id number]  Unit/Bed#: CM4 760-01 Encounter: [id number]    Assessment/Plan   Principal Problem:    Schizophrenia  Active Problems:    Benign essential HTN      Behavior over the last 24 hours:  improved  Sleep: normal  Appetite: normal  Medication side effects: No  ROS: no complaints    Mental Status Evaluation:  Appearance:  age appropriate and casually dressed   Behavior:  cooperative   Speech:  normal pitch and normal volume   Mood:  constricted   Affect:  constricted   Thought Process:  circumstantial   Thought Content:  delusions  grandiose   Perceptual Disturbances: None   Risk Potential: Suicidal Ideations none, Homicidal Ideations none and Potential for Aggression No   Sensorium:  person and place   Cognition:  grossly intact   Consciousness:  alert and awake    Attention: attention span appeared shorter than expected for age   Insight:  limited   Judgment: limited   Gait/Station: normal gait/station   Motor Activity: no abnormal movements     Progress Toward Goals: Patient is tolerating well medication and denies side effects  He is pacing less, and talking to himself less  He is less guarded or paranoid but still has grandiose delusions  Continue Abilify Maintenna monthly  Recommended Treatment: Continue with group therapy, milieu therapy and occupational therapy  Risks, benefits and possible side effects of Medications:   Risks, benefits, and possible side effects of medications explained to patient and patient verbalizes understanding        Medications:   all current active meds have been reviewed, continue current psychiatric medications and current meds:   Current Facility-Administered Medications   Medication Dose Route Frequency    acetaminophen (TYLENOL) tablet 650 mg  650 mg Oral Q6H PRN    aluminum-magnesium hydroxide-simethicone (MYLANTA) 200-200-20 mg/5 mL oral suspension 30 mL  30 mL Oral Q4H PRN    amLODIPine (NORVASC) tablet 10 mg  10 mg Oral Daily    ARIPiprazole (ABILIFY) tablet 25 mg  25 mg Oral Daily    benztropine (COGENTIN) injection 1 mg  1 mg Intramuscular Q6H PRN    benztropine (COGENTIN) tablet 1 mg  1 mg Oral Q6H PRN    chlorthalidone tablet 25 mg  25 mg Oral Daily    haloperidol (HALDOL) tablet 5 mg  5 mg Oral Q6H PRN    haloperidol lactate (HALDOL) injection 5 mg  5 mg Intramuscular Q6H PRN    ibuprofen (MOTRIN) tablet 600 mg  600 mg Oral Q8H PRN    lisinopril (ZESTRIL) tablet 20 mg  20 mg Oral Daily    LORazepam (ATIVAN) 2 mg/mL injection 1 mg  1 mg Intramuscular Q6H PRN    LORazepam (ATIVAN) tablet 1 mg  1 mg Oral Q6H PRN    magnesium hydroxide (MILK OF MAGNESIA) 400 mg/5 mL oral suspension 30 mL  30 mL Oral Daily PRN    nicotine (NICODERM CQ) 14 mg/24hr TD 24 hr patch 1 patch  1 patch Transdermal Daily    risperiDONE (RisperDAL M-TABS) dispersible tablet 1 mg  1 mg Oral Q3H PRN    traZODone (DESYREL) tablet 50 mg  50 mg Oral HS PRN     Labs: reviewed    Counseling / Coordination of Care  Total floor / unit time spent today n/a minutes  Greater than 50% of total time was spent with the patient and / or family counseling and / or coordination of care   A description of the counseling / coordination of care:
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
## 2017-10-04 RX ORDER — BENZTROPINE MESYLATE 0.5 MG/1
0.5 TABLET ORAL 2 TIMES DAILY
Status: DISCONTINUED | OUTPATIENT
Start: 2017-10-04 | End: 2017-10-19 | Stop reason: HOSPADM

## 2017-10-04 RX ORDER — ARIPIPRAZOLE 10 MG/1
20 TABLET ORAL DAILY
Status: DISCONTINUED | OUTPATIENT
Start: 2017-10-05 | End: 2017-10-06

## 2017-10-04 RX ORDER — HALOPERIDOL 2 MG/1
2 TABLET ORAL 2 TIMES DAILY
Status: DISCONTINUED | OUTPATIENT
Start: 2017-10-04 | End: 2017-10-05

## 2017-10-04 RX ADMIN — CHLORTHALIDONE 25 MG: 25 TABLET ORAL at 08:47

## 2017-10-04 RX ADMIN — AMLODIPINE BESYLATE 10 MG: 10 TABLET ORAL at 08:20

## 2017-10-04 RX ADMIN — ARIPIPRAZOLE 25 MG: 15 TABLET ORAL at 08:20

## 2017-10-04 RX ADMIN — LISINOPRIL 20 MG: 20 TABLET ORAL at 08:20

## 2017-10-04 RX ADMIN — BENZTROPINE MESYLATE 1 MG: 1 TABLET ORAL at 13:24

## 2017-10-04 RX ADMIN — HALOPERIDOL 2 MG: 2 TABLET ORAL at 13:24

## 2017-10-04 RX ADMIN — NICOTINE 1 PATCH: 14 PATCH, EXTENDED RELEASE TRANSDERMAL at 08:20

## 2017-10-04 RX ADMIN — BENZTROPINE MESYLATE 0.5 MG: 0.5 TABLET ORAL at 18:14
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
## 2017-10-04 NOTE — PLAN OF CARE
Problem: PSYCHOSIS  Goal: Will report no hallucinations or delusions  Interventions:  - Administer medication as  ordered  - Every waking shifts and PRN assess for the presence of hallucinations and or delusions  - Assist with reality testing to support increasing orientation  - Assess if patient's hallucinations or delusions are encouraging self-harm or harm to others and intervene as appropriate   -Goal extended on 10/3/2017   Outcome: Progressing      Problem: INVOLUNTARY ADMIT  Goal: Will cooperate with staff recommendations and doctor's orders and will demonstrate appropriate behavior  INTERVENTIONS:  - Treat underlying conditions and offer medication as ordered  - Educate regarding involuntary admission procedures and rules  - Utilize positive consistent limit setting strategies to support patient and staff safety   -Goal extended on 10/13/2017   Outcome: Progressing      Problem: Prexisting or High Potential for Compromised Skin Integrity  Goal: Skin integrity is maintained or improved  INTERVENTIONS:  - Identify patients at risk for skin breakdown  - Assess and monitor skin integrity  - Assess and monitor nutrition and hydration status  - Monitor labs (i e  Albumin)  - Assess for incontinence   -Goal extended on 10/02/2017  - Turn and reposition patient  - Assist with mobility/ambulation  - Relieve pressure over bony prominences  - Avoid friction and shearing  - Provide appropriate hygiene as needed including keeping skin clean and dry  - Evaluate need for skin moisturizer/barrier cream  - Collaborate with interdisciplinary team (i e  Nutrition, Rehabilitation, etc )   - Patient/family teaching    Outcome: Progressing      Problem: Ineffective Coping  Goal: Identifies ineffective coping skills  Outcome: Progressing    Goal: Identifies healthy coping skills  Goal extended on 10/02/2017   Outcome: Progressing    Goal: Participates in unit activities  Interventions:  - Provide therapeutic environment   - Provide required programming   - Redirect inappropriate behaviors    Outcome: Progressing      Problem: DISCHARGE PLANNING  Goal: Discharge to home or other facility with appropriate resources  INTERVENTIONS:  - Identify barriers to discharge w/patient and caregiver  - Arrange for needed discharge resources and transportation as appropriate  - Identify discharge learning needs (meds, wound care, etc )  - Arrange for interpretive services to assist at discharge as needed  - Refer to Case Management Department for coordinating discharge planning if the patient needs post-hospital services based on physician/advanced practitioner order or complex needs related to functional status, cognitive ability, or social support system   Outcome: Progressing
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
## 2017-10-04 NOTE — PLAN OF CARE
Problem: PSYCHOSIS  Goal: Will report no hallucinations or delusions  Interventions:  - Administer medication as  ordered  - Every waking shifts and PRN assess for the presence of hallucinations and or delusions  - Assist with reality testing to support increasing orientation  - Assess if patient's hallucinations or delusions are encouraging self-harm or harm to others and intervene as appropriate   Outcome: Progressing      Problem: INVOLUNTARY ADMIT  Goal: Will cooperate with staff recommendations and doctor's orders and will demonstrate appropriate behavior  INTERVENTIONS:  - Treat underlying conditions and offer medication as ordered  - Educate regarding involuntary admission procedures and rules  - Utilize positive consistent limit setting strategies to support patient and staff safety   Outcome: Adequate for Discharge      Problem: Prexisting or High Potential for Compromised Skin Integrity  Goal: Skin integrity is maintained or improved  INTERVENTIONS:  - Identify patients at risk for skin breakdown  - Assess and monitor skin integrity  - Assess and monitor nutrition and hydration status  - Monitor labs (i e  Albumin)  - Assess for incontinence   -Goal extended on 10/02/2017  - Turn and reposition patient  - Assist with mobility/ambulation  - Relieve pressure over bony prominences  - Avoid friction and shearing  - Provide appropriate hygiene as needed including keeping skin clean and dry  - Evaluate need for skin moisturizer/barrier cream  - Collaborate with interdisciplinary team (i e  Nutrition, Rehabilitation, etc )   - Patient/family teaching    Outcome: Adequate for Discharge      Problem: Ineffective Coping  Goal: Identifies ineffective coping skills  Outcome: Progressing    Goal: Identifies healthy coping skills  Goal extended on 10/02/2017   Outcome: Progressing    Goal: Participates in unit activities  Interventions:  - Provide therapeutic environment   - Provide required programming   - Redirect inappropriate behaviors    Outcome: Progressing      Problem: DISCHARGE PLANNING  Goal: Discharge to home or other facility with appropriate resources  INTERVENTIONS:  - Identify barriers to discharge w/patient and caregiver  - Arrange for needed discharge resources and transportation as appropriate  - Identify discharge learning needs (meds, wound care, etc )  - Arrange for interpretive services to assist at discharge as needed  - Refer to Case Management Department for coordinating discharge planning if the patient needs post-hospital services based on physician/advanced practitioner order or complex needs related to functional status, cognitive ability, or social support system   Outcome: Progressing

## 2017-10-04 NOTE — PROGRESS NOTES
Progress Note - Behavioral Health   Jeremías Witt 55 y o  male MRN: @MRN   Unit/Bed#: RK7 760-01 Encounter: [id number]        The patient was seen for continuing care and reviewed with staff  Report from staff regarding this patient received and discussed, and records reviewed prior to seeing this patient   Today the patient became more preoccupied with his delusional belief that his daughter stolen all of his money buying houses in [place]  Was more tense, guarded, and has symptoms of psychomotor agitation  His mental state deteriorated  Behavior over the last 24 hours:  Regressing  Patient remains appropriate, calm, delusional, depressed and disheveled today  Sleep: no change  Appetite: improving  Medication side effects: No   ROS: no complaints    Mental Status Evaluation: visible deterioration  More psychomotor activity with anxious and tense mood and constricted affect  Fast rate and low volume of speech  Thought pattern is circumstantial, thought content - paranoid delusions remain  Not suicidal  Language was appropriate, associations - concrete  Memory - hard to test due to ongoing delusional interpretation of the past  Alert, oriented times 3  Insight and judgement - poor due to psychosis  Laboratory results:  I have personally reviewed all pertinent laboratory results  Progress Toward Goals: minimal improvement    Assessment/Plan   Principal Problem:    Schizophrenia ([facility] [address] )  Active Problems:    Benign essential HTN      Recommended Treatment:  Will start Haldol with initially low dose  2 mg bid, with titration as tolerated  Will lower oral Abilify to 20 mg       Planned medication and treatment changes: All current active medications have been reviewed  Continue treatment with group therapy, milieu therapy, occupational therapy and medication management        Risks / Benefits of Treatment:    Risks, benefits, and possible side effects of medications explained to patient including risk of parkinsonian symptoms, Tardive Dyskinesia and metabolic syndrome related to treatment with antipsychotic medications  The patient verbalizes understanding and agreement for treatment  Counseling / Coordination of Care:    Patient's progress discussed with staff in treatment team meeting  Medications, treatment progress and treatment plan reviewed with patient  Medication education provided to patient  Supportive therapy provided to patient  Coping skills reviewed with patient  Portions of the record may have been created with voice recognition software  Occasional wrong word or "sound a like" substitutions may have occurred due to the inherent limitations of voice recognition software  Read the chart carefully and recognize, using context, where substitutions have occurred  There may be translation, syntax,  or grammatical errors  If you have any questions, please contact the dictating provider

## 2017-10-05 RX ORDER — HALOPERIDOL 5 MG
2.5 TABLET ORAL 2 TIMES DAILY
Status: DISCONTINUED | OUTPATIENT
Start: 2017-10-05 | End: 2017-10-09

## 2017-10-05 RX ADMIN — BENZTROPINE MESYLATE 0.5 MG: 0.5 TABLET ORAL at 08:27

## 2017-10-05 RX ADMIN — NICOTINE 1 PATCH: 14 PATCH, EXTENDED RELEASE TRANSDERMAL at 08:33

## 2017-10-05 RX ADMIN — CHLORTHALIDONE 25 MG: 25 TABLET ORAL at 08:30

## 2017-10-05 RX ADMIN — BENZTROPINE MESYLATE 0.5 MG: 0.5 TABLET ORAL at 18:17

## 2017-10-05 RX ADMIN — LISINOPRIL 20 MG: 20 TABLET ORAL at 08:29

## 2017-10-05 RX ADMIN — ARIPIPRAZOLE 20 MG: 10 TABLET ORAL at 09:09

## 2017-10-05 RX ADMIN — AMLODIPINE BESYLATE 10 MG: 10 TABLET ORAL at 08:29

## 2017-10-05 RX ADMIN — HALOPERIDOL 2 MG: 2 TABLET ORAL at 08:27

## 2017-10-05 RX ADMIN — HALOPERIDOL 2.5 MG: 5 TABLET ORAL at 18:18

## 2017-10-05 NOTE — PROGRESS NOTES
Pt appears calm  Pt is cooperative with BH assessment  Pt denies SI's, HI's, and A/V hallucinations  Encouraged pt to be out in the milieu and not secluded to room  Pt is med and meal complaint  Will continue to monitor and provide therapeutic support

## 2017-10-05 NOTE — CASE MANAGEMENT
All 304 documentation had been faxed to [facility] yesterday on 10/4/17 and this morning on 10/5/17, Maria Del Carmen Rubio verified that all documentation was complete and correct  Maria Del Carmen Rubio states that the hearing will be this Tuesday on October 10th at [address] remains resistant to accepting this pt for ACT services though all of their requested documentation has been sent and psychiatrist Dr Chidi Sands reached out to discuss the case with their psychiatrist Dr Johana Marinelli who did not return his call as of this time  Therefore, CM completing other referral for pt to obtain services and completed [address] Recovery Team referral to [facility] for special ICM services that address both mental and medical issues (if pt also has a medical diagnosis)   Pt is calm, cooperative, quiet demeanor, selectively social

## 2017-10-05 NOTE — PROGRESS NOTES
Progress Note - Infectious Disease   Ian Palmer 55 y o  male MRN: [id number]  Unit/Bed#: NW7 760-01 Encounter: [id number]      Impression/Recommendations:  1   Latent syphilis   Patient received 14 days of IV penicillin 2 months ago  Ibeth Mercado may still be too early to expect to see reduction of RPR titer   However, there is always the possibility a reinfection    Will continue with IM benzathine penicillin weekly x 3 weeks   Will continue to follow RPR titer afterwards  Continue benzathine penicillin IM weekly x 3 weeks, next/last dose tomorrow  Patient will need to follow up with health bureau for serial RPRs afterwards      2   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   Patient completed appropriate antibiotic course for this 2 months ago   No further treatment is necessary  No further treatment or workup for this needed      3   Schizophrenia with delusional thoughts   I suspect this is all psychiatric illness and not related to syphilis  Management per Psychiatry service      Discussed with the patient in detail regarding the above plan  Patient will need to follow up with [facility] for serial RPR's after discharge  At this point, I will sign off  Thank you for the consultation  Please do not hesitate to reconsult us for any questions or issues      Antibiotics:  Benzathine penicillin x 2 doses     Subjective:  Patient's mental status is unchanged  delusional thoughts unchanged  No headache  He tolerated the 2 benzathine penicillin injections well  No further pain in gluteus  Objective:  Vitals:  HR:  [61-92] 61  Resp:  [16] 16  BP: (152-157)/(70-85) 157/74  Temp (24hrs), Av 8 °F (36 6 °C), Min:97 7 °F (36 5 °C), Max:97 8 °F (36 6 °C)  Current: Temperature: 97 8 °F (36 6 °C)    Physical Exam:     General: Awake, alert, cooperative, no distress  Lungs: Expansion symmetric, no rales, no wheezing, respirations unlabored  Heart[de-identified]  Regular rate and rhythm, S1 and S2 normal, no murmur     Abdomen: Soft, nondistended, non-tender, bowel sounds active all four quadrants,        no masses, no organomegaly  Extremities: No edema  No erythema/warmth  No ulcer  Nontender to palpation  Skin:  No rash  Invasive Devices          No matching active lines, drains, or airways          Labs studies:   I have personally reviewed pertinent labs  Imaging Studies:   I have personally reviewed pertinent imaging study reports and images in PACS  EKG, Pathology, and Other Studies:   I have personally reviewed pertinent reports

## 2017-10-05 NOTE — PROGRESS NOTES
Progress Note - Behavioral Health   Sneha Mckoy 55 y o  male MRN: @MRN   Unit/Bed#: JM6 760-01 Encounter: [id number]        The patient was seen for continuing care and reviewed with staff  Report from staff regarding this patient received and discussed, and records reviewed prior to seeing this patient   Today the patient became more preoccupied with his delusional belief that his daughter stolen all of his money buying houses in [place]  Was more tense, guarded, and has symptoms of psychomotor agitation  His mental state deteriorated  Behavior over the last 24 hours:  Regressing  Patient remains appropriate, calm, delusional, depressed and disheveled today  Sleep: no change  Appetite: improving  Medication side effects: No   ROS: no complaints    Mental Status Evaluation:  Moderate improvement  The patient was not agitated or angry all counts starting was obsessive thoughts  He was more common polite at improved eye contact  Speech was slow rate and normal volume  Thoughts were concrete  He still endorsed chronic paranoid delusions about his daughter which the patient stated are to all of his money  He stated that this behavior or her daughter was not new  He admitted that he had the same thoughts about 5 years ago the and he was treated in psychiatric units  His memory recent and remote was intact  Language was appropriate associations were concrete  Patient was fully alert oriented x3  Denied auditory visual hallucinations  Insight and judgment  restricted but slowly improving    Laboratory results:  I have personally reviewed all pertinent laboratory results  Progress Toward Goals: minimal improvement    Assessment/Plan   Principal Problem:    Schizophrenia ([facility] [address] )  Active Problems:    Benign essential HTN      Recommended Treatment:  Will continue Haldol with dose increase to 2 5  mg bid, with titration as tolerated  Will lower oral Abilify to 20 mg     Cognitive OT to evaluate an ability for independent living at the ACT team request       Planned medication and treatment changes: All current active medications have been reviewed  Continue treatment with group therapy, milieu therapy, occupational therapy and medication management  Risks / Benefits of Treatment:    Risks, benefits, and possible side effects of medications explained to patient including risk of parkinsonian symptoms, Tardive Dyskinesia and metabolic syndrome related to treatment with antipsychotic medications  The patient verbalizes understanding and agreement for treatment  Counseling / Coordination of Care:    Patient's progress discussed with staff in treatment team meeting  Medications, treatment progress and treatment plan reviewed with patient  Medication education provided to patient  Supportive therapy provided to patient  Coping skills reviewed with patient  Portions of the record may have been created with voice recognition software  Occasional wrong word or "sound a like" substitutions may have occurred due to the inherent limitations of voice recognition software  Read the chart carefully and recognize, using context, where substitutions have occurred  There may be translation, syntax,  or grammatical errors  If you have any questions, please contact the dictating provider

## 2017-10-06 RX ORDER — ARIPIPRAZOLE 15 MG/1
15 TABLET ORAL DAILY
Status: DISCONTINUED | OUTPATIENT
Start: 2017-10-07 | End: 2017-10-16

## 2017-10-06 RX ADMIN — BENZTROPINE MESYLATE 0.5 MG: 0.5 TABLET ORAL at 08:33

## 2017-10-06 RX ADMIN — ARIPIPRAZOLE 20 MG: 10 TABLET ORAL at 08:33

## 2017-10-06 RX ADMIN — HALOPERIDOL 2.5 MG: 5 TABLET ORAL at 08:33

## 2017-10-06 RX ADMIN — BENZTROPINE MESYLATE 0.5 MG: 0.5 TABLET ORAL at 17:02

## 2017-10-06 RX ADMIN — CHLORTHALIDONE 25 MG: 25 TABLET ORAL at 11:30

## 2017-10-06 RX ADMIN — LISINOPRIL 20 MG: 20 TABLET ORAL at 08:35

## 2017-10-06 RX ADMIN — HALOPERIDOL 2.5 MG: 5 TABLET ORAL at 17:02

## 2017-10-06 RX ADMIN — AMLODIPINE BESYLATE 10 MG: 10 TABLET ORAL at 08:35

## 2017-10-06 RX ADMIN — NICOTINE 1 PATCH: 14 PATCH, EXTENDED RELEASE TRANSDERMAL at 08:37

## 2017-10-06 RX ADMIN — PENICILLIN G BENZATHINE 2.4 MILLION UNITS: 1200000 INJECTION, SUSPENSION INTRAMUSCULAR at 16:35

## 2017-10-06 NOTE — PROGRESS NOTES
Pt still remains delusional that his daughter wants to kill him and steal his money  Pt is calm and pleasant upon interacting with staff and other patients

## 2017-10-06 NOTE — PLAN OF CARE
Problem: PSYCHOSIS  Goal: Will report no hallucinations or delusions  Interventions:  - Administer medication as  ordered  - Every waking shifts and PRN assess for the presence of hallucinations and or delusions  - Assist with reality testing to support increasing orientation  - Assess if patient's hallucinations or delusions are encouraging self-harm or harm to others and intervene as appropriate   -Goal extended on 10/3/2017   Outcome: Progressing      Problem: INVOLUNTARY ADMIT  Goal: Will cooperate with staff recommendations and doctor's orders and will demonstrate appropriate behavior  INTERVENTIONS:  - Treat underlying conditions and offer medication as ordered  - Educate regarding involuntary admission procedures and rules  - Utilize positive consistent limit setting strategies to support patient and staff safety   -Goal extended on 10/13/2017   Outcome: Adequate for Discharge      Problem: Prexisting or High Potential for Compromised Skin Integrity  Goal: Skin integrity is maintained or improved  INTERVENTIONS:  - Identify patients at risk for skin breakdown  - Assess and monitor skin integrity  - Assess and monitor nutrition and hydration status  - Monitor labs (i e  Albumin)  - Assess for incontinence   -Goal extended on 10/02/2017  - Turn and reposition patient  - Assist with mobility/ambulation  - Relieve pressure over bony prominences  - Avoid friction and shearing  - Provide appropriate hygiene as needed including keeping skin clean and dry  - Evaluate need for skin moisturizer/barrier cream  - Collaborate with interdisciplinary team (i e  Nutrition, Rehabilitation, etc )   - Patient/family teaching    Outcome: Completed Date Met: 10/06/17      Problem: Ineffective Coping  Goal: Identifies ineffective coping skills  Outcome: Progressing    Goal: Identifies healthy coping skills  Goal extended on 10/02/2017   Outcome: Progressing    Goal: Participates in unit activities  Interventions:  - Provide therapeutic environment   - Provide required programming   - Redirect inappropriate behaviors    Outcome: Progressing      Problem: DISCHARGE PLANNING  Goal: Discharge to home or other facility with appropriate resources  INTERVENTIONS:  - Identify barriers to discharge w/patient and caregiver  - Arrange for needed discharge resources and transportation as appropriate  - Identify discharge learning needs (meds, wound care, etc )  - Arrange for interpretive services to assist at discharge as needed  - Refer to Case Management Department for coordinating discharge planning if the patient needs post-hospital services based on physician/advanced practitioner order or complex needs related to functional status, cognitive ability, or social support system   -Goal extended on 10/04/2017   Outcome: Progressing

## 2017-10-06 NOTE — PROGRESS NOTES
Progress Note - Behavioral Health   Jeremías Witt 55 y o  male MRN: @MRN   Unit/Bed#: VX3 760-01 Encounter: [id number]        The patient was seen for continuing care and reviewed with staff  Report from staff regarding this patient received and discussed, and records reviewed prior to seeing this patient   The patient was evaluated in the presence of medical student  The patient's condition slowly improved was his paranoid ideations are less evident  He continued to be guarded and self isolating however most compliant with the medication management and unit rules  Cognitive OT results are pending  Behavior over the last 24 hours:  Improving slowly        Patient remains appropriate, calm, delusional, depressed and disheveled today  Sleep: no change  Appetite: improving  Medication side effects: No   ROS: no complaints    Mental Status Evaluation:  Patient was evaluated in the presence of medical student  He was guarded and had concrete responses was not delayed  He declined to discuss his paranoid delusions about his the daughter  Denied suicidal homicidal ideations did not endorse any hallucinations  Affect was restricted and mood was "OK"  Was fully alert, oriented x3  Language was appropriate associations were concrete  Memory recent and remote was grossly intact keep in mind that the patient paranoid delusions might affect his interpretation of reality and past memories the insight and judgment was restricted the the patient feels better and less tense    L the the boratory results:  I have personally reviewed all pertinent laboratory results  Progress Toward Goals: minimal improvement    Assessment/Plan   Principal Problem:    Schizophrenia ([facility] [address] )  Active Problems:    Benign essential HTN      Recommended Treatment:  Will continue Haldol with dose increase to 2 5  mg bid, with titration as tolerated  Will further lower oral Abilify to 15 mg     Cognitive OT to evaluate an ability for independent living at the ACT team request       Planned medication and treatment changes: All current active medications have been reviewed  Continue treatment with group therapy, milieu therapy, occupational therapy and medication management  Risks / Benefits of Treatment:    Risks, benefits, and possible side effects of medications explained to patient including risk of parkinsonian symptoms, Tardive Dyskinesia and metabolic syndrome related to treatment with antipsychotic medications  The patient verbalizes understanding and agreement for treatment  Counseling / Coordination of Care:    Patient's progress discussed with staff in treatment team meeting  Medications, treatment progress and treatment plan reviewed with patient  Medication education provided to patient  Supportive therapy provided to patient  Coping skills reviewed with patient  Portions of the record may have been created with voice recognition software  Occasional wrong word or "sound a like" substitutions may have occurred due to the inherent limitations of voice recognition software  Read the chart carefully and recognize, using context, where substitutions have occurred  There may be translation, syntax,  or grammatical errors  If you have any questions, please contact the dictating provider

## 2017-10-06 NOTE — CASE MANAGEMENT
CM had made a referral yesterday to [facility] wellness recovery team ICM services who would help pt both with his mental and physical issues  Left a message for Joslyn Weinstein today who heads up the WRT of [facility]; she will return on Monday as per her office and SARA needs to contact her again on Monday for her to come out and see/assess patient  Pt is pleasant, quiet, calm, social with select peers

## 2017-10-07 RX ADMIN — CHLORTHALIDONE 25 MG: 25 TABLET ORAL at 10:14

## 2017-10-07 RX ADMIN — HALOPERIDOL 2.5 MG: 5 TABLET ORAL at 10:08

## 2017-10-07 RX ADMIN — LISINOPRIL 20 MG: 20 TABLET ORAL at 10:07

## 2017-10-07 RX ADMIN — BENZTROPINE MESYLATE 0.5 MG: 0.5 TABLET ORAL at 10:15

## 2017-10-07 RX ADMIN — BENZTROPINE MESYLATE 0.5 MG: 0.5 TABLET ORAL at 18:05

## 2017-10-07 RX ADMIN — NICOTINE 1 PATCH: 14 PATCH, EXTENDED RELEASE TRANSDERMAL at 10:19

## 2017-10-07 RX ADMIN — AMLODIPINE BESYLATE 10 MG: 10 TABLET ORAL at 10:15

## 2017-10-07 RX ADMIN — HALOPERIDOL 2.5 MG: 5 TABLET ORAL at 18:05

## 2017-10-07 RX ADMIN — ARIPIPRAZOLE 15 MG: 15 TABLET ORAL at 10:06

## 2017-10-07 NOTE — PROGRESS NOTES
Progress Note - Behavioral Health   Lars Mcdowell 55 y o  male MRN: [id number]  Unit/Bed#: SA3 769-01 Encounter: [id number]    Assessment/Plan   Principal Problem:    Schizophrenia ([address] )  Active Problems:    Benign essential HTN      Behavior over the last 24 hours:  unchanged  Sleep: normal  Appetite: normal  Medication side effects: No  ROS: no complaints    Mental Status Evaluation:  Appearance:  thin & gaunt looking   Behavior:  guarded   Speech:  soft   Mood:  anxious and decreased range   Affect:  flat   Thought Process:  disorganized, illogical and loose associations   Thought Content:  delusions  grandiose and persecutory   Perceptual Disturbances: denies   Risk Potential: denies   Sensorium:  person   Cognition:  grossly intact   Consciousness:  awake    Attention: attention span and concentration were age appropriate   Insight:  limited   Judgment: limited   Gait/Station: normal gait/station   Motor Activity: no abnormal movements     Progress Toward Goals: Pt seen this morning in individual session,still rather delusional and grandiose with limited insight  Pt is however engaged in [facility] and denied mood symptoms with myself during the interview  Pt is hopeful for a discharge soon  Pt has been engaged in unit milue with no behavioral issues  Recommended Treatment:  1-Cont current treatment  2- Continue with group therapy, milieu therapy and occupational therapy  Risks, benefits and possible side effects of Medications:   Risks, benefits, and possible side effects of medications explained to patient and patient verbalizes understanding        Medications:   current meds:   Current Facility-Administered Medications   Medication Dose Route Frequency    acetaminophen (TYLENOL) tablet 650 mg  650 mg Oral Q6H PRN    aluminum-magnesium hydroxide-simethicone (MYLANTA) 200-200-20 mg/5 mL oral suspension 30 mL  30 mL Oral Q4H PRN    amLODIPine (NORVASC) tablet 10 mg  10 mg Oral Daily    ARIPiprazole (ABILIFY) tablet 15 mg  15 mg Oral Daily    benztropine (COGENTIN) injection 1 mg  1 mg Intramuscular Q6H PRN    benztropine (COGENTIN) tablet 0 5 mg  0 5 mg Oral BID    benztropine (COGENTIN) tablet 1 mg  1 mg Oral Q6H PRN    chlorthalidone tablet 25 mg  25 mg Oral Daily    haloperidol (HALDOL) tablet 2 5 mg  2 5 mg Oral BID    haloperidol (HALDOL) tablet 5 mg  5 mg Oral Q6H PRN    haloperidol lactate (HALDOL) injection 5 mg  5 mg Intramuscular Q6H PRN    ibuprofen (MOTRIN) tablet 600 mg  600 mg Oral Q8H PRN    lisinopril (ZESTRIL) tablet 20 mg  20 mg Oral Daily    LORazepam (ATIVAN) 2 mg/mL injection 1 mg  1 mg Intramuscular Q6H PRN    LORazepam (ATIVAN) tablet 1 mg  1 mg Oral Q6H PRN    magnesium hydroxide (MILK OF MAGNESIA) 400 mg/5 mL oral suspension 30 mL  30 mL Oral Daily PRN    nicotine (NICODERM CQ) 14 mg/24hr TD 24 hr patch 1 patch  1 patch Transdermal Daily    risperiDONE (RisperDAL M-TABS) dispersible tablet 1 mg  1 mg Oral Q3H PRN    traZODone (DESYREL) tablet 50 mg  50 mg Oral HS PRN     Labs: I have personally reviewed pt's labs    Counseling / Coordination of Care  Total floor / unit time spent today 25 minutes  Greater than 50% of total time was spent with the patient and / or family counseling and / or coordination of care   A description of the counseling / coordination of care:

## 2017-10-07 NOTE — PROGRESS NOTES
Pt remains fixated on delusion that his daughter stole his money and bought a "big mansion in [place] with my money "  Pt is calm and pleasant with staff and other patients

## 2017-10-08 RX ADMIN — BENZTROPINE MESYLATE 0.5 MG: 0.5 TABLET ORAL at 09:57

## 2017-10-08 RX ADMIN — NICOTINE 1 PATCH: 14 PATCH, EXTENDED RELEASE TRANSDERMAL at 10:03

## 2017-10-08 RX ADMIN — LISINOPRIL 20 MG: 20 TABLET ORAL at 09:56

## 2017-10-08 RX ADMIN — CHLORTHALIDONE 25 MG: 25 TABLET ORAL at 09:57

## 2017-10-08 RX ADMIN — ARIPIPRAZOLE 15 MG: 15 TABLET ORAL at 09:56

## 2017-10-08 RX ADMIN — HALOPERIDOL 2.5 MG: 5 TABLET ORAL at 18:10

## 2017-10-08 RX ADMIN — AMLODIPINE BESYLATE 10 MG: 10 TABLET ORAL at 09:57

## 2017-10-08 RX ADMIN — HALOPERIDOL 2.5 MG: 5 TABLET ORAL at 09:56

## 2017-10-08 RX ADMIN — BENZTROPINE MESYLATE 0.5 MG: 0.5 TABLET ORAL at 18:11

## 2017-10-08 NOTE — PLAN OF CARE
Problem: PSYCHOSIS  Goal: Will report no hallucinations or delusions  Interventions:  - Administer medication as  ordered  - Every waking shifts and PRN assess for the presence of hallucinations and or delusions  - Assist with reality testing to support increasing orientation  - Assess if patient's hallucinations or delusions are encouraging self-harm or harm to others and intervene as appropriate   -Goal extended on 10/3/2017   Outcome: Progressing      Problem: INVOLUNTARY ADMIT  Goal: Will cooperate with staff recommendations and doctor's orders and will demonstrate appropriate behavior  INTERVENTIONS:  - Treat underlying conditions and offer medication as ordered  - Educate regarding involuntary admission procedures and rules  - Utilize positive consistent limit setting strategies to support patient and staff safety   -Goal extended on 10/13/2017   Outcome: Progressing      Problem: Ineffective Coping  Goal: Identifies ineffective coping skills  Outcome: Progressing    Goal: Identifies healthy coping skills  Goal extended on 10/02/2017   Outcome: Progressing    Goal: Participates in unit activities  Interventions:  - Provide therapeutic environment   - Provide required programming   - Redirect inappropriate behaviors    Outcome: Progressing      Problem: DISCHARGE PLANNING  Goal: Discharge to home or other facility with appropriate resources  INTERVENTIONS:  - Identify barriers to discharge w/patient and caregiver  - Arrange for needed discharge resources and transportation as appropriate  - Identify discharge learning needs (meds, wound care, etc )  - Arrange for interpretive services to assist at discharge as needed  - Refer to Case Management Department for coordinating discharge planning if the patient needs post-hospital services based on physician/advanced practitioner order or complex needs related to functional status, cognitive ability, or social support system   -Goal extended on 10/04/2017   Outcome: Progressing

## 2017-10-08 NOTE — PROGRESS NOTES
PT is scant, pleasant, and cooperative  Pt denies all s/s  PT has been out in the dayroom watching TV
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
## 2017-10-09 RX ORDER — HALOPERIDOL 5 MG
5 TABLET ORAL 2 TIMES DAILY
Status: DISCONTINUED | OUTPATIENT
Start: 2017-10-09 | End: 2017-10-12

## 2017-10-09 RX ADMIN — HALOPERIDOL 5 MG: 5 TABLET ORAL at 18:32

## 2017-10-09 RX ADMIN — LISINOPRIL 20 MG: 20 TABLET ORAL at 08:25

## 2017-10-09 RX ADMIN — ARIPIPRAZOLE 15 MG: 15 TABLET ORAL at 08:25

## 2017-10-09 RX ADMIN — NICOTINE 1 PATCH: 14 PATCH, EXTENDED RELEASE TRANSDERMAL at 08:25

## 2017-10-09 RX ADMIN — HALOPERIDOL 2.5 MG: 5 TABLET ORAL at 08:26

## 2017-10-09 RX ADMIN — CHLORTHALIDONE 25 MG: 25 TABLET ORAL at 08:25

## 2017-10-09 RX ADMIN — BENZTROPINE MESYLATE 0.5 MG: 0.5 TABLET ORAL at 18:32

## 2017-10-09 RX ADMIN — AMLODIPINE BESYLATE 10 MG: 10 TABLET ORAL at 08:25

## 2017-10-09 RX ADMIN — BENZTROPINE MESYLATE 0.5 MG: 0.5 TABLET ORAL at 08:25

## 2017-10-09 NOTE — PROGRESS NOTES
Pt denies SI or HI  He also denies any hallucinations and did not verbalize any delusions   Pt is polite and cooperative, although scant, stated, "My thoughts are a lot better "

## 2017-10-09 NOTE — PROGRESS NOTES
PT is pleasant but scant w/ conversation  Pt denies all s/s  No delusional content noted  Pt is medication compliant

## 2017-10-09 NOTE — CASE MANAGEMENT
Called [facility] this morning and left message for Sachin Mckoy, supervisor, to find out when they can come assess pt for their services

## 2017-10-09 NOTE — PROGRESS NOTES
Pt is polite and medication complaint  Pt denies all s/s  Pt is out in dayroom playing cards w/ peers

## 2017-10-10 LAB
ANION GAP SERPL CALCULATED.3IONS-SCNC: 5 MMOL/L (ref 4–13)
BASOPHILS # BLD AUTO: 0.03 THOUSANDS/ΜL (ref 0–0.1)
BASOPHILS NFR BLD AUTO: 1 % (ref 0–1)
BUN SERPL-MCNC: 19 MG/DL (ref 5–25)
CALCIUM SERPL-MCNC: 8.8 MG/DL (ref 8.3–10.1)
CHLORIDE SERPL-SCNC: 95 MMOL/L (ref 100–108)
CO2 SERPL-SCNC: 31 MMOL/L (ref 21–32)
CREAT SERPL-MCNC: 1 MG/DL (ref 0.6–1.3)
EOSINOPHIL # BLD AUTO: 0.2 THOUSAND/ΜL (ref 0–0.61)
EOSINOPHIL NFR BLD AUTO: 3 % (ref 0–6)
ERYTHROCYTE [DISTWIDTH] IN BLOOD BY AUTOMATED COUNT: 12.8 % (ref 11.6–15.1)
GFR SERPL CREATININE-BSD FRML MDRD: 84 ML/MIN/1.73SQ M
GLUCOSE P FAST SERPL-MCNC: 242 MG/DL (ref 65–99)
GLUCOSE SERPL-MCNC: 242 MG/DL (ref 65–140)
HCT VFR BLD AUTO: 44.8 % (ref 36.5–49.3)
HGB BLD-MCNC: 15.2 G/DL (ref 12–17)
LYMPHOCYTES # BLD AUTO: 2.91 THOUSANDS/ΜL (ref 0.6–4.47)
LYMPHOCYTES NFR BLD AUTO: 47 % (ref 14–44)
MCH RBC QN AUTO: 29.9 PG (ref 26.8–34.3)
MCHC RBC AUTO-ENTMCNC: 33.9 G/DL (ref 31.4–37.4)
MCV RBC AUTO: 88 FL (ref 82–98)
MONOCYTES # BLD AUTO: 0.46 THOUSAND/ΜL (ref 0.17–1.22)
MONOCYTES NFR BLD AUTO: 8 % (ref 4–12)
NEUTROPHILS # BLD AUTO: 2.47 THOUSANDS/ΜL (ref 1.85–7.62)
NEUTS SEG NFR BLD AUTO: 41 % (ref 43–75)
NRBC BLD AUTO-RTO: 0 /100 WBCS
PLATELET # BLD AUTO: 291 THOUSANDS/UL (ref 149–390)
PMV BLD AUTO: 10.5 FL (ref 8.9–12.7)
POTASSIUM SERPL-SCNC: 3.9 MMOL/L (ref 3.5–5.3)
RBC # BLD AUTO: 5.08 MILLION/UL (ref 3.88–5.62)
SODIUM SERPL-SCNC: 131 MMOL/L (ref 136–145)
WBC # BLD AUTO: 6.09 THOUSAND/UL (ref 4.31–10.16)

## 2017-10-10 PROCEDURE — 85025 COMPLETE CBC W/AUTO DIFF WBC: CPT | Performed by: PSYCHIATRY & NEUROLOGY

## 2017-10-10 PROCEDURE — 80048 BASIC METABOLIC PNL TOTAL CA: CPT | Performed by: PSYCHIATRY & NEUROLOGY

## 2017-10-10 RX ADMIN — HALOPERIDOL 5 MG: 5 TABLET ORAL at 17:20

## 2017-10-10 RX ADMIN — NICOTINE 1 PATCH: 14 PATCH, EXTENDED RELEASE TRANSDERMAL at 08:26

## 2017-10-10 RX ADMIN — BENZTROPINE MESYLATE 0.5 MG: 0.5 TABLET ORAL at 08:26

## 2017-10-10 RX ADMIN — CHLORTHALIDONE 25 MG: 25 TABLET ORAL at 09:16

## 2017-10-10 RX ADMIN — ARIPIPRAZOLE 15 MG: 15 TABLET ORAL at 08:26

## 2017-10-10 RX ADMIN — HALOPERIDOL 5 MG: 5 TABLET ORAL at 08:26

## 2017-10-10 RX ADMIN — AMLODIPINE BESYLATE 10 MG: 10 TABLET ORAL at 08:26

## 2017-10-10 RX ADMIN — LISINOPRIL 20 MG: 20 TABLET ORAL at 08:26

## 2017-10-10 RX ADMIN — BENZTROPINE MESYLATE 0.5 MG: 0.5 TABLET ORAL at 17:20

## 2017-10-10 NOTE — PROGRESS NOTES
Patient conversation scant with content but he did deny feeling suicidal  Patient with no delusional talk and out in milieu keeping to himself  Patient took his medications with no difficulty

## 2017-10-10 NOTE — CASE MANAGEMENT
Spoke to Mando Gaines from [facility] who reviewed pt's referral and was very pleasant but states pt isn't quite as medically ill as the rest of their patients/the type of patients they take and that she must take the quite medically ill pts first which is 25 pts on her waiting list; however, she was helpful, did want to help this pt and suggested their ICM team which includes a nurse and the pt could be seen twice a week and also supervised with getting him to his medical and psych appts  She told me to submit a referral to their ICM team and also stated that if the ICM team thought the pt's medical needs warranted the WRT team, Mando Gaines would pick him up as a patient  Therefore, CM will make [facility] referral  Pt had [facility] [address] hearing which he tolerated very well
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
## 2017-10-10 NOTE — PROGRESS NOTES
Progress Note - Behavioral Health   Charline Clark 55 y o  male MRN: @MRN   Unit/Bed#: EY7 759-01 Encounter: [id number]        The patient was seen for continuing care and reviewed with staff  Report from staff regarding this patient received and discussed, and records reviewed prior to seeing this patient   Today's is 3 of 4 hearing supported the patient's continuation of inpatient stay was transitioned to outpatient care when appropriate  The patient stated that he wanted to continue his treatment in the inpatient unit  He was still moderately guarded and slightly paranoid however overall improved  Behavior over the last 24 hours:  Improving slowly        Patient remains appropriate, calm, delusional, depressed and disheveled today  Sleep: no change  Appetite: improving  Medication side effects: No   ROS: no complaints    Mental Status Evaluation:  No significant change  Anxious mood was restricted affect, relatively poor eye contact  Patient's speech was slow rate and low volume  Thoughts were concrete  Today he did not mention his daughter and did not report paranoid ideations  Denied auditory hallucinations  Not responding to internal stimuli  Recent remote memory was WNL  Patient was fully alert oriented x3  His recent memory about his inpatient treatment was normal  Patient's language was appropriate associations were concrete  His insight and judgment remained very restricted because of ongoing psychosis    L the the boratory results:  I have personally reviewed all pertinent laboratory results  Progress Toward Goals: minimal improvement    Assessment/Plan   Principal Problem:    Schizophrenia ([facility] [address] )  Active Problems:    Benign essential HTN      Recommended Treatment:  Will continue Haldol with dose increase to 5  mg bid, with titration as tolerated  Will continue oral Abilify  15 mg  Planned medication and treatment changes:     All current active medications have been reviewed  Continue treatment with group therapy, milieu therapy, occupational therapy and medication management  Risks / Benefits of Treatment:    Risks, benefits, and possible side effects of medications explained to patient including risk of parkinsonian symptoms, Tardive Dyskinesia and metabolic syndrome related to treatment with antipsychotic medications  The patient verbalizes understanding and agreement for treatment  Counseling / Coordination of Care:    Patient's progress discussed with staff in treatment team meeting  Medications, treatment progress and treatment plan reviewed with patient  Medication education provided to patient  Supportive therapy provided to patient  Coping skills reviewed with patient  Portions of the record may have been created with voice recognition software  Occasional wrong word or "sound a like" substitutions may have occurred due to the inherent limitations of voice recognition software  Read the chart carefully and recognize, using context, where substitutions have occurred  There may be translation, syntax,  or grammatical errors  If you have any questions, please contact the dictating provider

## 2017-10-11 LAB
GLUCOSE SERPL-MCNC: 265 MG/DL (ref 65–140)
GLUCOSE SERPL-MCNC: 400 MG/DL (ref 65–140)
GLUCOSE SERPL-MCNC: 494 MG/DL (ref 65–140)
OSMOLALITY UR: 576 MMOL/KG
SODIUM 24H UR-SCNC: 87 MOL/L

## 2017-10-11 PROCEDURE — 83935 ASSAY OF URINE OSMOLALITY: CPT | Performed by: INTERNAL MEDICINE

## 2017-10-11 PROCEDURE — 84300 ASSAY OF URINE SODIUM: CPT | Performed by: INTERNAL MEDICINE

## 2017-10-11 PROCEDURE — 82948 REAGENT STRIP/BLOOD GLUCOSE: CPT

## 2017-10-11 RX ORDER — METFORMIN HYDROCHLORIDE 750 MG/1
750 TABLET, EXTENDED RELEASE ORAL
Status: DISCONTINUED | OUTPATIENT
Start: 2017-10-11 | End: 2017-10-13

## 2017-10-11 RX ADMIN — BENZTROPINE MESYLATE 0.5 MG: 0.5 TABLET ORAL at 17:00

## 2017-10-11 RX ADMIN — INSULIN LISPRO 7 UNITS: 100 INJECTION, SOLUTION INTRAVENOUS; SUBCUTANEOUS at 22:47

## 2017-10-11 RX ADMIN — AMLODIPINE BESYLATE 10 MG: 10 TABLET ORAL at 08:48

## 2017-10-11 RX ADMIN — ARIPIPRAZOLE 15 MG: 15 TABLET ORAL at 08:48

## 2017-10-11 RX ADMIN — LISINOPRIL 20 MG: 20 TABLET ORAL at 08:48

## 2017-10-11 RX ADMIN — METFORMIN HYDROCHLORIDE 750 MG: 750 TABLET, EXTENDED RELEASE ORAL at 20:17

## 2017-10-11 RX ADMIN — NICOTINE 1 PATCH: 14 PATCH, EXTENDED RELEASE TRANSDERMAL at 08:48

## 2017-10-11 RX ADMIN — CHLORTHALIDONE 25 MG: 25 TABLET ORAL at 08:48

## 2017-10-11 RX ADMIN — HALOPERIDOL 5 MG: 5 TABLET ORAL at 17:00

## 2017-10-11 RX ADMIN — INSULIN LISPRO 2 UNITS: 100 INJECTION, SOLUTION INTRAVENOUS; SUBCUTANEOUS at 19:00

## 2017-10-11 RX ADMIN — BENZTROPINE MESYLATE 0.5 MG: 0.5 TABLET ORAL at 08:48

## 2017-10-11 RX ADMIN — HALOPERIDOL 5 MG: 5 TABLET ORAL at 08:48

## 2017-10-11 NOTE — CASE MANAGEMENT
James Hernandez, supervisor, from [address] [place] [address] Team called and is interested in this pt  He requested more information and it was faxed to him  James Hernandez is supposed to call CM back if he feels pt is appropriate for their many services
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
## 2017-10-11 NOTE — PROGRESS NOTES
Patient denies all symptoms and is out in milieu mostly keeping to himself  I did tried to encourage him to participate in groups and spend less time in his room

## 2017-10-11 NOTE — CASE MANAGEMENT
Called [facility] to cancel pt's appointment for today  They want CM to reschedule when we know an exact discharge date for patient

## 2017-10-12 LAB
ANION GAP SERPL CALCULATED.3IONS-SCNC: 9 MMOL/L (ref 4–13)
BUN SERPL-MCNC: 20 MG/DL (ref 5–25)
CALCIUM SERPL-MCNC: 8.6 MG/DL (ref 8.3–10.1)
CHLORIDE SERPL-SCNC: 97 MMOL/L (ref 100–108)
CO2 SERPL-SCNC: 28 MMOL/L (ref 21–32)
CREAT SERPL-MCNC: 0.99 MG/DL (ref 0.6–1.3)
GFR SERPL CREATININE-BSD FRML MDRD: 85 ML/MIN/1.73SQ M
GLUCOSE P FAST SERPL-MCNC: 205 MG/DL (ref 65–99)
GLUCOSE SERPL-MCNC: 188 MG/DL (ref 65–140)
GLUCOSE SERPL-MCNC: 203 MG/DL (ref 65–140)
GLUCOSE SERPL-MCNC: 205 MG/DL (ref 65–140)
GLUCOSE SERPL-MCNC: 227 MG/DL (ref 65–140)
GLUCOSE SERPL-MCNC: 247 MG/DL (ref 65–140)
GLUCOSE SERPL-MCNC: 282 MG/DL (ref 65–140)
GLUCOSE SERPL-MCNC: 307 MG/DL (ref 65–140)
OSMOLALITY UR/SERPL-RTO: 289 MMOL/KG (ref 282–298)
POTASSIUM SERPL-SCNC: 3.9 MMOL/L (ref 3.5–5.3)
SODIUM SERPL-SCNC: 134 MMOL/L (ref 136–145)

## 2017-10-12 PROCEDURE — 83930 ASSAY OF BLOOD OSMOLALITY: CPT | Performed by: INTERNAL MEDICINE

## 2017-10-12 PROCEDURE — 82948 REAGENT STRIP/BLOOD GLUCOSE: CPT

## 2017-10-12 PROCEDURE — 80048 BASIC METABOLIC PNL TOTAL CA: CPT | Performed by: INTERNAL MEDICINE

## 2017-10-12 RX ORDER — HALOPERIDOL 2 MG/1
2 TABLET ORAL 2 TIMES DAILY
Status: DISCONTINUED | OUTPATIENT
Start: 2017-10-13 | End: 2017-10-19 | Stop reason: HOSPADM

## 2017-10-12 RX ORDER — INSULIN GLARGINE 100 [IU]/ML
5 INJECTION, SOLUTION SUBCUTANEOUS
Status: DISCONTINUED | OUTPATIENT
Start: 2017-10-12 | End: 2017-10-13

## 2017-10-12 RX ADMIN — BENZTROPINE MESYLATE 0.5 MG: 0.5 TABLET ORAL at 08:32

## 2017-10-12 RX ADMIN — CHLORTHALIDONE 25 MG: 25 TABLET ORAL at 08:33

## 2017-10-12 RX ADMIN — INSULIN GLARGINE 5 UNITS: 100 INJECTION, SOLUTION SUBCUTANEOUS at 22:26

## 2017-10-12 RX ADMIN — BENZTROPINE MESYLATE 0.5 MG: 0.5 TABLET ORAL at 17:29

## 2017-10-12 RX ADMIN — INSULIN LISPRO 3 UNITS: 100 INJECTION, SOLUTION INTRAVENOUS; SUBCUTANEOUS at 08:34

## 2017-10-12 RX ADMIN — INSULIN LISPRO 2 UNITS: 100 INJECTION, SOLUTION INTRAVENOUS; SUBCUTANEOUS at 17:32

## 2017-10-12 RX ADMIN — LISINOPRIL 20 MG: 20 TABLET ORAL at 08:32

## 2017-10-12 RX ADMIN — METFORMIN HYDROCHLORIDE 750 MG: 750 TABLET, EXTENDED RELEASE ORAL at 17:28

## 2017-10-12 RX ADMIN — HALOPERIDOL 5 MG: 5 TABLET ORAL at 08:32

## 2017-10-12 RX ADMIN — HALOPERIDOL 5 MG: 5 TABLET ORAL at 17:29

## 2017-10-12 RX ADMIN — NICOTINE 1 PATCH: 14 PATCH, EXTENDED RELEASE TRANSDERMAL at 08:33

## 2017-10-12 RX ADMIN — INSULIN LISPRO 1 UNITS: 100 INJECTION, SOLUTION INTRAVENOUS; SUBCUTANEOUS at 02:10

## 2017-10-12 RX ADMIN — AMLODIPINE BESYLATE 10 MG: 10 TABLET ORAL at 08:32

## 2017-10-12 RX ADMIN — INSULIN LISPRO 1 UNITS: 100 INJECTION, SOLUTION INTRAVENOUS; SUBCUTANEOUS at 12:25

## 2017-10-12 RX ADMIN — ARIPIPRAZOLE 15 MG: 15 TABLET ORAL at 08:32

## 2017-10-12 RX ADMIN — INSULIN GLARGINE 5 UNITS: 100 INJECTION, SOLUTION SUBCUTANEOUS at 00:28

## 2017-10-12 NOTE — PROGRESS NOTES
[address]    Internal Medicine Progress Note: Ron Witt 55 y o  male MRN: [id number]  Unit/Bed#: LK5 759-01 Encounter: [id number]  Code Status: Level 1 - Full Code      HPI/24hr events: Patient seen and examined at the bedside where he is resting comfortably  He denies N/V/D, fever, chills or pain  He is awake and alert, is oriented to person and place, takes prompting for date and reports president is Obama  He offers no other complaints at this time  RN reports elevated glucose overnight and he is not on any diabetic medications at home  Review of Systems   Constitutional: Negative for chills and fever  HENT: Negative  Eyes: Negative  Respiratory: Negative  Cardiovascular: Negative  Gastrointestinal: Negative  Endocrine: Negative  Musculoskeletal: Negative  Neurological: Negative  Hematological: Negative  Psychiatric/Behavioral: Positive for decreased concentration, dysphoric mood and sleep disturbance  All other systems reviewed and are negative  Vitals:   Vitals:    10/11/17 0749 10/11/17 1523 10/12/17 0741 10/12/17 0832   BP: 147/78 159/70 154/69 154/69   Pulse: 82 77 83    Resp: 16 16 16    Temp: 98 1 °F (36 7 °C) 97 8 °F (36 6 °C) 98 1 °F (36 7 °C)    TempSrc: Oral Oral Oral    Weight:       Height:         Temp  Min: 97 2 °F (36 2 °C)  Max: 98 7 °F (37 1 °C)  IBW: 82 2 kg  Body mass index is 20 05 kg/m²  Temp (24hrs), Av °F (36 7 °C), Min:97 8 °F (36 6 °C), Max:98 1 °F (36 7 °C)    Intake and Outputs:No intake or output data in the 24 hours ending 10/12/17 0840  No intake/output data recorded      Nutrition:        Diet Orders            Start     Ordered    10/11/17 2236  Diet Alex/CHO Controlled; Consistent Carbohydrate Diet Level 1 (4 carb servings/60 grams CHO/meal)  Diet effective now     Question Answer Comment   Diet Type Alex/CHO Controlled    Alex/CHO Controlled Consistent Carbohydrate Diet Level 1 (4 carb servings/60 grams CHO/meal)    RD to adjust diet per protocol? Yes        10/11/17 2236        Lab:     Results from last 7 days  Lab Units 10/10/17  0645   WBC Thousand/uL 6 09   HEMOGLOBIN g/dL 15 2   HEMATOCRIT % 44 8   PLATELETS Thousands/uL 291   NEUTROS PCT % 41*   MONOS PCT % 8       Results from last 7 days  Lab Units 10/12/17  0633 10/10/17  0645   SODIUM mmol/L 134* 131*   POTASSIUM mmol/L 3 9 3 9   CHLORIDE mmol/L 97* 95*   CO2 mmol/L 28 31   BUN mg/dL 20 19   CREATININE mg/dL 0 99 1 00   CALCIUM mg/dL 8 6 8 8   GLUCOSE RANDOM mg/dL 205* 242*     Imaging: No new imaging overnight     Allergies: No Known Allergies    Medications:   Scheduled Meds:  amLODIPine 10 mg Oral Daily   ARIPiprazole 15 mg Oral Daily   benztropine 0 5 mg Oral BID   chlorthalidone 25 mg Oral Daily   haloperidol 5 mg Oral BID   insulin glargine 5 Units Subcutaneous HS   insulin lispro 1-5 Units Subcutaneous TID AC   insulin lispro 1-5 Units Subcutaneous 0200   lisinopril 20 mg Oral Daily   metFORMIN 750 mg Oral Daily With Dinner   nicotine 1 patch Transdermal Daily     Continuous Infusions:   PRN Meds  :  acetaminophen    aluminum-magnesium hydroxide-simethicone    benztropine    benztropine    haloperidol    haloperidol lactate    ibuprofen    LORazepam    LORazepam    magnesium hydroxide    risperiDONE    traZODone    Invasive lines and devices:   Invasive Devices          No matching active lines, drains, or airways        Physical Exam:  Constitutional: Normal appearing, well nourished, well developed,NAD, resting comfortably in bed wide awake   HEENT: Normocephalic and atraumatic, MMM   Eyes: PEARRL  Neck: Neck supple, trachea midline, no JVD  Cardiovascular: Regular rate and rhythm, no murmurs, rubs or gallops  Respiratory: Clear to auscultation bilaterally, no rales, rhonchi or wheeze   Abdomen: soft, not distended, non-tender  Musculoskeletal: Normal muscle bulk and tone  Neurological: Patient is alert and oriented to person, place, needs prompting for time and unable to provide name of president  No other focal deficits  Skin: Skin is warm and dry     Psychiatric: appropriate mood, flat affect    Impression:  Patient Active Problem List   Diagnosis    Paranoia ([facility] [address] )    Neurosyphilis in adult    Benign essential HTN    Prediabetes    HLD (hyperlipidemia)    Elevated liver enzymes    Tobacco dependence    Schizophrenia ([facility] [address] )    Hepatitis C    Atypical psychosis     A/P: 55 Y/OM with hx recently treated neurosyphilis, pre-diabetes, hepatitis C and HTN being seen in consultation follow-up    Hyponatremia  -[Na] today 134 from 131 10/10, new since 10/10, Chlorthaladone started this admission, this may be 2/2 Chlorthaladone vs psychiatric medications, consider reduction of dose or d/c Chlorthalidone, as asymptomatic will continue to monitor for now, recheck Na tomorrow  -Urine Osm 576, Urine Na 87, not reliable 2/2 diuretic      Neurosyphilis  -previously confirmed with serum and CSF (9/17)  -HIV negative (7/17)  -has completed course of IV Penicilin ending 8/3/17 and 3 weeks of IM penicillin weekly dosing completed 10/6/17  -ID previously recommended follow-up RPR at 6/12/24 months from Aug 2017 and will need to follow-up with [facility] at d/c    Chronic Hepatitis B and C  -RUQ US revealed mild hepatomegaly   -will need o/p f/u with GI for treatment     HTN  -continue Chlorthalidone, Lisinopril and Amlodipine, consider addition of Hydralizine or labetalol if SBP remains >140 persistently     Presdiabetes  -HbA1c 6 1 in July, recheck now as accuchecks are consistently high, continue Metformin and ISS    Polly Sanchez DO  Internal Medicine PGY-3  10/12/2017 8:40 AM

## 2017-10-12 NOTE — PROGRESS NOTES
Spoke with SOD about HS blood sugar  Fingerstick redone at that time and was 494  7 units Humalog ordered and given

## 2017-10-12 NOTE — MEDICAL STUDENT
MEDICAL STUDENT NOTE - NOT PART OF LEGAL RECORD    Progress Note - Behavioral Health   Land Whit 55 y o  male MRN: [id number]  Unit/Bed#: QZ4 759-01 Encounter: [id number]    Assessment/Plan   Principal Problem:    Schizophrenia [facility])  Active Problems:    Benign essential HTN    Patient continues to report mild depression, but states that overall he feels good  He denies SI/HI and stated he has not had any recent auditory hallucinations  He did not express any overt delusions regarding his daughter today as he has in the past, and when questioned about his feelings, he stated "I'm trying not to think about her, I need to focus on me"  He denied noticing any dyskinetic movements  AIMS examination was positive, primarily in the area of oral movements  Behavior over the last 24 hours:  Improving  Sleep: normal  Appetite: normal  Medication side effects: No  ROS: no complaints    Mental Status Evaluation:  Appearance:  casually dressed and disheveled   Behavior:  normal   Speech:  normal pitch and normal volume   Mood:  anxious and depressed   Affect:  constricted   Thought Process:  concrete   Thought Content:  delusions  regarding daughter   Perceptual Disturbances: none reported today   Risk Potential: Suicidal Ideations none   Sensorium:  person, place and time/date   Cognition:  grossly intact   Consciousness:  alert and awake    Attention: attention span and concentration were age appropriate   Insight:  fair   Judgment: fair   Gait/Station: normal gait/station   Motor Activity: abnormal movement noted  dyskinetic movements of the lips/jaw     Progress Toward Goals: Improving  Discontinue Haldol  Recommended Treatment: Continue with group therapy, milieu therapy and occupational therapy  Medications: all current active meds have been reviewed and planned medication changes: discontinue Haldol  Labs: Pertinent labs reviewed        [address] [facility] [employer] Student

## 2017-10-12 NOTE — CASE MANAGEMENT
Made referral for regular [facility] ICM team because no WRT's have an ICM aspect to their program  Spoke to Josh again and am trying to obtain an assessment date, they did not call back with that date  Made a referral to [facility] WRT in case things do not come to fruition with [facility] and they are supposed to call me back tomorrow with an intake date
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
## 2017-10-12 NOTE — PROGRESS NOTES
Progress Note - Behavioral Health   Cristo Ludwig 55 y o  male MRN: @MRN   Unit/Bed#: KE6 759-01 Encounter: [id number]        The patient was seen for continuing care and reviewed with staff  Report from staff regarding this patient received and discussed, and records reviewed prior to seeing this patient   He felt "good", and slept better  He denied voices, but had paranoid thoughts about his daughter  His glucose level was elevate and Medicine prescribed menformin  Behavior over the last 24 hours:  Improving slowly        Patient remains appropriate, calm, delusional, depressed and disheveled today  Sleep: no change  Appetite: improving  Medication side effects: No   ROS: no complaints    Mental Status Evaluation:  No significant change  Anxious mood was restricted affect, relatively poor eye contact  Patient's speech was slow rate and low volume  Thoughts were concrete  Today he did not mention his daughter and did not report paranoid ideations  Denied auditory hallucinations  Not responding to internal stimuli  Recent remote memory was WNL  Patient was fully alert oriented x3  His recent memory about his inpatient treatment was normal  Patient's language was appropriate associations were concrete  His insight and judgment remained very restricted because of ongoing psychosis    L the the boratory results:  I have personally reviewed all pertinent laboratory results  Progress Toward Goals: minimal improvement    Assessment/Plan   Principal Problem:    Schizophrenia (HCC)  Active Problems:    Benign essential HTN      Recommended Treatment:  Will decrease Haldol to 2 5  mg bid, pt has transient facial motor movements, resembling TD  Will continue oral Abilify  15 mg  Planned medication and treatment changes: All current active medications have been reviewed  Continue treatment with group therapy, milieu therapy, occupational therapy and medication management        Risks / Benefits of Treatment:    Risks, benefits, and possible side effects of medications explained to patient including risk of parkinsonian symptoms, Tardive Dyskinesia and metabolic syndrome related to treatment with antipsychotic medications  The patient verbalizes understanding and agreement for treatment  Counseling / Coordination of Care:    Patient's progress discussed with staff in treatment team meeting  Medications, treatment progress and treatment plan reviewed with patient  Medication education provided to patient  Supportive therapy provided to patient  Coping skills reviewed with patient  Portions of the record may have been created with voice recognition software  Occasional wrong word or "sound a like" substitutions may have occurred due to the inherent limitations of voice recognition software  Read the chart carefully and recognize, using context, where substitutions have occurred  There may be translation, syntax,  or grammatical errors  If you have any questions, please contact the dictating provider

## 2017-10-12 NOTE — PLAN OF CARE
Problem: PSYCHOSIS  Goal: Will report no hallucinations or delusions  Interventions:  - Administer medication as  ordered  - Every waking shifts and PRN assess for the presence of hallucinations and or delusions  - Assist with reality testing to support increasing orientation  - Assess if patient's hallucinations or delusions are encouraging self-harm or harm to others and intervene as appropriate   -Goal extended on 10/3/2017   Outcome: Progressing      Problem: Ineffective Coping  Goal: Identifies ineffective coping skills  Outcome: Progressing    Goal: Identifies healthy coping skills  Goal extended on 10/02/2017   Outcome: Progressing    Goal: Participates in unit activities  Interventions:  - Provide therapeutic environment   - Provide required programming   - Redirect inappropriate behaviors      Goal extended on 10/12/2017   Outcome: Progressing      Problem: DISCHARGE PLANNING  Goal: Discharge to home or other facility with appropriate resources  INTERVENTIONS:  - Identify barriers to discharge w/patient and caregiver  - Arrange for needed discharge resources and transportation as appropriate  - Identify discharge learning needs (meds, wound care, etc )  - Arrange for interpretive services to assist at discharge as needed  - Refer to Case Management Department for coordinating discharge planning if the patient needs post-hospital services based on physician/advanced practitioner order or complex needs related to functional status, cognitive ability, or social support system   -Goal extended on 10/04/2017   Outcome: Progressing
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
## 2017-10-12 NOTE — PROGRESS NOTES
Patient seen by medicine today and is now on accuchecks and insulin sliding scale  He is cooperative with this  Metformin also ordered  Did not receive insulin from pharmacy until 6 45pm - 2 units given at that time  Blood sugar was 265 pre dinner and he ate 100% of his dinner  Urine for osmolality and sodium obtained and sent to lab

## 2017-10-13 LAB
ANION GAP SERPL CALCULATED.3IONS-SCNC: 6 MMOL/L (ref 4–13)
BUN SERPL-MCNC: 22 MG/DL (ref 5–25)
CALCIUM SERPL-MCNC: 8.4 MG/DL (ref 8.3–10.1)
CHLORIDE SERPL-SCNC: 99 MMOL/L (ref 100–108)
CO2 SERPL-SCNC: 30 MMOL/L (ref 21–32)
CREAT SERPL-MCNC: 0.92 MG/DL (ref 0.6–1.3)
EST. AVERAGE GLUCOSE BLD GHB EST-MCNC: 174 MG/DL
GFR SERPL CREATININE-BSD FRML MDRD: 93 ML/MIN/1.73SQ M
GLUCOSE P FAST SERPL-MCNC: 158 MG/DL (ref 65–99)
GLUCOSE SERPL-MCNC: 158 MG/DL (ref 65–140)
GLUCOSE SERPL-MCNC: 226 MG/DL (ref 65–140)
GLUCOSE SERPL-MCNC: 232 MG/DL (ref 65–140)
GLUCOSE SERPL-MCNC: 234 MG/DL (ref 65–140)
GLUCOSE SERPL-MCNC: 285 MG/DL (ref 65–140)
GLUCOSE SERPL-MCNC: 308 MG/DL (ref 65–140)
HBA1C MFR BLD: 7.7 % (ref 4.2–6.3)
POTASSIUM SERPL-SCNC: 4.2 MMOL/L (ref 3.5–5.3)
SODIUM SERPL-SCNC: 135 MMOL/L (ref 136–145)

## 2017-10-13 PROCEDURE — 82948 REAGENT STRIP/BLOOD GLUCOSE: CPT

## 2017-10-13 PROCEDURE — 80048 BASIC METABOLIC PNL TOTAL CA: CPT | Performed by: INTERNAL MEDICINE

## 2017-10-13 PROCEDURE — 83036 HEMOGLOBIN GLYCOSYLATED A1C: CPT | Performed by: INTERNAL MEDICINE

## 2017-10-13 RX ORDER — METFORMIN HYDROCHLORIDE 500 MG/1
1000 TABLET, EXTENDED RELEASE ORAL 2 TIMES DAILY WITH MEALS
Status: DISCONTINUED | OUTPATIENT
Start: 2017-10-13 | End: 2017-10-19 | Stop reason: HOSPADM

## 2017-10-13 RX ADMIN — INSULIN LISPRO 2 UNITS: 100 INJECTION, SOLUTION INTRAVENOUS; SUBCUTANEOUS at 08:47

## 2017-10-13 RX ADMIN — AMLODIPINE BESYLATE 10 MG: 10 TABLET ORAL at 08:41

## 2017-10-13 RX ADMIN — INSULIN LISPRO 2 UNITS: 100 INJECTION, SOLUTION INTRAVENOUS; SUBCUTANEOUS at 16:45

## 2017-10-13 RX ADMIN — BENZTROPINE MESYLATE 0.5 MG: 0.5 TABLET ORAL at 18:29

## 2017-10-13 RX ADMIN — LISINOPRIL 20 MG: 20 TABLET ORAL at 08:42

## 2017-10-13 RX ADMIN — CHLORTHALIDONE 25 MG: 25 TABLET ORAL at 10:29

## 2017-10-13 RX ADMIN — HALOPERIDOL 2 MG: 2 TABLET ORAL at 08:46

## 2017-10-13 RX ADMIN — INSULIN LISPRO 2 UNITS: 100 INJECTION, SOLUTION INTRAVENOUS; SUBCUTANEOUS at 02:12

## 2017-10-13 RX ADMIN — HALOPERIDOL 2 MG: 2 TABLET ORAL at 18:29

## 2017-10-13 RX ADMIN — NICOTINE 1 PATCH: 14 PATCH, EXTENDED RELEASE TRANSDERMAL at 08:45

## 2017-10-13 RX ADMIN — INSULIN LISPRO 3 UNITS: 100 INJECTION, SOLUTION INTRAVENOUS; SUBCUTANEOUS at 12:35

## 2017-10-13 RX ADMIN — BENZTROPINE MESYLATE 0.5 MG: 0.5 TABLET ORAL at 08:42

## 2017-10-13 RX ADMIN — ARIPIPRAZOLE 15 MG: 15 TABLET ORAL at 08:41

## 2017-10-13 RX ADMIN — METFORMIN HYDROCHLORIDE 1000 MG: 500 TABLET, EXTENDED RELEASE ORAL at 17:30

## 2017-10-13 NOTE — CASE MANAGEMENT
Spoke to [employer] from [facility] and she states she didn't get referral, CM re-faxed it 3 times  She states she will contact CM Monday and assign someone to come out to see this patient and then pick him up for services along with [facility]'s wellness recovery team  an  from [facility] will call CM on Monday with the date the pt is to start the WRT program/date of his assessment  If [employer]  does not call CM by 1300, we will call her

## 2017-10-13 NOTE — PROGRESS NOTES
[address]    Internal Medicine Progress Note: Chata Lucero Rising 55 y o  male MRN: [id number]  Unit/Bed#: JW2 759-01 Encounter: [id number]  Code Status: Level 1 - Full Code    HPI/24hr events: Patient seen and examined at the bedside where he is resting comfortably  He denies any acute complaints overnight  His glu has been running high and he was started on low dose Lantus overnight by the night team and he tolerated it well, his morning readings have been significantly improved  He offers no other complaints today and reports that he is feeling much improved  Review of Systems   Constitutional: Negative for chills and fever  HENT: Negative  Eyes: Negative  Respiratory: Negative  Cardiovascular: Negative  Gastrointestinal: Negative for abdominal pain, constipation, diarrhea, nausea and vomiting  Endocrine: Negative  Genitourinary: Negative  Musculoskeletal: Negative  Skin: Negative  Neurological: Negative  Hematological: Negative  Psychiatric/Behavioral: Positive for dysphoric mood  All other systems reviewed and are negative  Vitals:   Vitals:    10/12/17 0741 10/12/17 0832 10/12/17 1117 10/12/17 1500   BP: 154/69 154/69 142/74 143/66   Pulse: 83  75 80   Resp: 16  16 16   Temp: 98 1 °F (36 7 °C)   97 9 °F (36 6 °C)   TempSrc: Oral   Oral   Weight:       Height:         Temp  Min: 97 2 °F (36 2 °C)  Max: 98 7 °F (37 1 °C)  IBW: 82 2 kg  Body mass index is 20 05 kg/m²  Temp (24hrs), Av 9 °F (36 6 °C), Min:97 9 °F (36 6 °C), Max:97 9 °F (36 6 °C)    Intake and Outputs:No intake or output data in the 24 hours ending 10/13/17 0838  No intake/output data recorded      Nutrition:        Diet Orders            Start     Ordered    10/11/17 2236  Diet Alex/CHO Controlled; Consistent Carbohydrate Diet Level 1 (4 carb servings/60 grams CHO/meal)  Diet effective now     Question Answer Comment   Diet Type Alex/CHO Controlled    Alex/CHO Controlled Consistent Carbohydrate Diet Level 1 (4 carb servings/60 grams CHO/meal)    RD to adjust diet per protocol? Yes        10/11/17 2236        Lab:     Results from last 7 days  Lab Units 10/10/17  0645   WBC Thousand/uL 6 09   HEMOGLOBIN g/dL 15 2   HEMATOCRIT % 44 8   PLATELETS Thousands/uL 291   NEUTROS PCT % 41*   MONOS PCT % 8       Results from last 7 days  Lab Units 10/13/17  0640 10/12/17  0633 10/10/17  0645   SODIUM mmol/L 135* 134* 131*   POTASSIUM mmol/L 4 2 3 9 3 9   CHLORIDE mmol/L 99* 97* 95*   CO2 mmol/L 30 28 31   BUN mg/dL 22 20 19   CREATININE mg/dL 0 92 0 99 1 00   CALCIUM mg/dL 8 4 8 6 8 8   GLUCOSE RANDOM mg/dL 158* 205* 242*     Imaging: No new imaging overnight     Allergies: No Known Allergies    Medications:   Scheduled Meds:    amLODIPine 10 mg Oral Daily   ARIPiprazole 15 mg Oral Daily   benztropine 0 5 mg Oral BID   chlorthalidone 25 mg Oral Daily   haloperidol 2 mg Oral BID   insulin glargine 5 Units Subcutaneous HS   insulin lispro 1-5 Units Subcutaneous TID AC   insulin lispro 1-5 Units Subcutaneous 0200   lisinopril 20 mg Oral Daily   metFORMIN 750 mg Oral Daily With Dinner   nicotine 1 patch Transdermal Daily     Continuous Infusions:   PRN Meds  :  acetaminophen    aluminum-magnesium hydroxide-simethicone    benztropine    benztropine    haloperidol    haloperidol lactate    ibuprofen    LORazepam    LORazepam    magnesium hydroxide    risperiDONE    traZODone    Invasive lines and devices:   Invasive Devices          No matching active lines, drains, or airways        Physical Exam:  Constitutional: Normal appearing, well nourished, well developed,NAD, has just returned from taking shower   HEENT: Normocephalic and atraumatic, MMM   Eyes: PEARRL  Neck: Neck supple, trachea midline, no JVD  Cardiovascular: Regular rate and rhythm, no murmurs, rubs or gallops  Respiratory: Clear to auscultation bilaterally, no rales, rhonchi or wheeze   Abdomen: soft, not distended, non-tender  Musculoskeletal: Normal muscle bulk and tone  Neurological: Patient is awake and alert this morning with no focal neuro deficits  Skin: Skin is warm and dry   Psychiatric: appropriate mood, flat affect    Impression:  Patient Active Problem List   Diagnosis    Paranoia ([facility] [address]    Neurosyphilis in adult    Benign essential HTN    Prediabetes    HLD (hyperlipidemia)    Elevated liver enzymes    Tobacco dependence    Schizophrenia ([facility] )    Hepatitis C    Atypical psychosis     A/P: 55 Y/OM with hx recently treated neurosyphilis, diabetes, hepatitis C and HTN being seen in consultation follow-up    Hyponatremia  -[Na] today 135 from 131 10/10, new since 10/10, Chlorthaladone started this admission, this may be 2/2 Chlorthaladone vs psychiatric medications, consider reduction of dose or d/c Chlorthalidone if recurs, since has improved will continue current dose and monitor clinically, recheck labs on Monday     Neurosyphilis  -previously confirmed with serum and CSF (9/17)  -HIV negative (7/17)  -has completed course of IV Penicilin ending 8/3/17 and 3 weeks of IM penicillin weekly dosing completed 10/6/17  -ID previously recommended follow-up RPR at 6/12/24 months from Aug 2017 and will need to follow-up with [facility] at d/c    Chronic Hepatitis B and C  -RUQ US revealed mild hepatomegaly   -will need o/p f/u with GI for treatment     HTN  -continue Chlorthalidone, Lisinopril and Amlodipine, consider addition of Hydralizine or labetalol if SBP remains >140 persistently     Presdiabetes  -HbA1c 6 1 in July, rechecked today as accuchecks have been consistently high, A1c is now 7 7 continue Metformin and ISS, Lantus 5U HS added overnight by call team, will continue for now, pt will not likely require insulin at d/c if can tolerate oral regimen, consider addition of Juanuvia, contiue QID accuchecks and will adjust regimen as necessary    Severo Reddy, DO  Internal Medicine PGY-3  10/13/2017 8:38 AM

## 2017-10-13 NOTE — PLAN OF CARE
Problem: PSYCHOSIS  Goal: Will report no hallucinations or delusions  Interventions:  - Administer medication as  ordered  - Every waking shifts and PRN assess for the presence of hallucinations and or delusions  - Assist with reality testing to support increasing orientation  - Assess if patient's hallucinations or delusions are encouraging self-harm or harm to others and intervene as appropriate   -Goal extended on 10/3/2017   Outcome: Progressing      Problem: INVOLUNTARY ADMIT  Goal: Will cooperate with staff recommendations and doctor's orders and will demonstrate appropriate behavior  INTERVENTIONS:  - Treat underlying conditions and offer medication as ordered  - Educate regarding involuntary admission procedures and rules  - Utilize positive consistent limit setting strategies to support patient and staff safety   -Goal extended on 10/13/2017   Outcome: Progressing      Problem: Ineffective Coping  Goal: Identifies ineffective coping skills  Outcome: Progressing    Goal: Identifies healthy coping skills  Goal extended on 10/02/2017   Outcome: Progressing    Goal: Participates in unit activities  Interventions:  - Provide therapeutic environment   - Provide required programming   - Redirect inappropriate behaviors      Goal extended on 10/12/2017   Outcome: Progressing      Problem: DISCHARGE PLANNING  Goal: Discharge to home or other facility with appropriate resources  INTERVENTIONS:  - Identify barriers to discharge w/patient and caregiver  - Arrange for needed discharge resources and transportation as appropriate  - Identify discharge learning needs (meds, wound care, etc )  - Arrange for interpretive services to assist at discharge as needed  - Refer to Case Management Department for coordinating discharge planning if the patient needs post-hospital services based on physician/advanced practitioner order or complex needs related to functional status, cognitive ability, or social support system   -Goal extended on 10/04/2017   Outcome: Progressing

## 2017-10-13 NOTE — PROGRESS NOTES
See nursing communication from Dr LI regarding contacting SOD  I spoke with SOD resident this evening and he reviewed the accuchecks  He said it seemed the team were trying  To see if he could be treated without insulin  Glucophage was increased today also  He said they will monitor his blood sugar results and will come by to see him tomorrow if the accuchecks continue to be in the upper 200's/300
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
## 2017-10-14 PROBLEM — R73.9 HYPERGLYCEMIA: Status: ACTIVE | Noted: 2017-10-14

## 2017-10-14 PROBLEM — E87.1 HYPONATREMIA: Status: ACTIVE | Noted: 2017-10-14

## 2017-10-14 LAB
GLUCOSE SERPL-MCNC: 190 MG/DL (ref 65–140)
GLUCOSE SERPL-MCNC: 196 MG/DL (ref 65–140)
GLUCOSE SERPL-MCNC: 210 MG/DL (ref 65–140)
GLUCOSE SERPL-MCNC: 218 MG/DL (ref 65–140)
GLUCOSE SERPL-MCNC: 308 MG/DL (ref 65–140)

## 2017-10-14 PROCEDURE — 82948 REAGENT STRIP/BLOOD GLUCOSE: CPT

## 2017-10-14 RX ADMIN — LISINOPRIL 20 MG: 20 TABLET ORAL at 08:42

## 2017-10-14 RX ADMIN — HALOPERIDOL 2 MG: 2 TABLET ORAL at 08:42

## 2017-10-14 RX ADMIN — HALOPERIDOL 2 MG: 2 TABLET ORAL at 17:12

## 2017-10-14 RX ADMIN — INSULIN LISPRO 1 UNITS: 100 INJECTION, SOLUTION INTRAVENOUS; SUBCUTANEOUS at 08:47

## 2017-10-14 RX ADMIN — CHLORTHALIDONE 25 MG: 25 TABLET ORAL at 08:47

## 2017-10-14 RX ADMIN — BENZTROPINE MESYLATE 0.5 MG: 0.5 TABLET ORAL at 17:11

## 2017-10-14 RX ADMIN — INSULIN LISPRO 2 UNITS: 100 INJECTION, SOLUTION INTRAVENOUS; SUBCUTANEOUS at 17:00

## 2017-10-14 RX ADMIN — INSULIN LISPRO 1 UNITS: 100 INJECTION, SOLUTION INTRAVENOUS; SUBCUTANEOUS at 02:19

## 2017-10-14 RX ADMIN — NICOTINE 1 PATCH: 14 PATCH, EXTENDED RELEASE TRANSDERMAL at 08:51

## 2017-10-14 RX ADMIN — AMLODIPINE BESYLATE 10 MG: 10 TABLET ORAL at 08:41

## 2017-10-14 RX ADMIN — METFORMIN HYDROCHLORIDE 1000 MG: 500 TABLET, EXTENDED RELEASE ORAL at 08:41

## 2017-10-14 RX ADMIN — METFORMIN HYDROCHLORIDE 1000 MG: 500 TABLET, EXTENDED RELEASE ORAL at 16:41

## 2017-10-14 RX ADMIN — BENZTROPINE MESYLATE 0.5 MG: 0.5 TABLET ORAL at 08:42

## 2017-10-14 RX ADMIN — INSULIN LISPRO 4 UNITS: 100 INJECTION, SOLUTION INTRAVENOUS; SUBCUTANEOUS at 12:33

## 2017-10-14 RX ADMIN — ARIPIPRAZOLE 15 MG: 15 TABLET ORAL at 08:42

## 2017-10-14 NOTE — PROGRESS NOTES
Progress Note - Behavioral Health   Russell Beauchamp 55 y o  male MRN: @MRN   Unit/Bed#: KN9 759-01 Encounter: [id number]        The patient was seen for continuing care and reviewed with staff  Report from staff regarding this patient received and discussed, and records reviewed prior to seeing this patient   Today's the patient was was found not to be in any acute distress, compliant was milieu but still expressing his delusional believes  He stated that his daughter took his 4 million of dollars he earned as an successful [facility] [place] [facility]  After decrease of Haldol from 5 mg twice a day to 2 mg twice a day he is target dyskinesia type symptoms of of lips movements disappeared  [facility] was contacted about recent change in his diabetes treatment  They stated that the patient was placed on Lantus insulin however is a decided to stop insulin and provide the patient was increase metformin to ensure his better compliance on outpatient basis  We discussed the fact that the patient blood sugar levels around relatively high today in the medicine was aware of side abnormal blood glucose level  They stated that they would follow up the patient closely  Behavior over the last 24 hours:  Improving slowly        Patient remains appropriate, calm, delusional, depressed but no longer disheveled today  Sleep: no change  Appetite: improving  Medication side effects: No   ROS: no complaints    Mental Status Evaluation:  Today's the patient was calm and polite, was normal rate and volume of his speech and good eye contact  The patient is thoughts pattern was concrete  His language was appropriate, his associations were intact  He endorsed paranoid delusions that his daughter stole his money and both for houses  As the same time was he has improved insight judgment his stated that after discharge he will take his 425 dollars from his bank he gets as a social security system    He denied any thoughts of going after his daughter  Today he denies that he hears his daughter voice when he is alone  He did not respond to internal stimuli during the interview  His memory recent and remote was grossly intact despite of his paranoid delusion  Patient's was alert and oriented x3  His insight remains poor consistent was chronic paranoid schizophrenia psychosis, however his judgment is improving  Laboratory results:  I have personally reviewed all pertinent laboratory results  Progress Toward Goals: Improvement in judgment    Assessment/Plan   Principal Problem:    Schizophrenia ([facility] [address] )  Active Problems:    Benign essential HTN      Recommended Treatment:  Will continue Haldol to 2  mg bid, pt no longer has transient facial motor movements, resembling TD  Will continue oral Abilify  15 mg  Planned medication and treatment changes: All current active medications have been reviewed  Continue treatment with group therapy, milieu therapy, occupational therapy and medication management  Risks / Benefits of Treatment:    Risks, benefits, and possible side effects of medications explained to patient including risk of parkinsonian symptoms, Tardive Dyskinesia and metabolic syndrome related to treatment with antipsychotic medications  The patient verbalizes understanding and agreement for treatment  Counseling / Coordination of Care:    Patient's progress discussed with staff in treatment team meeting  Medications, treatment progress and treatment plan reviewed with patient  Medication education provided to patient  Supportive therapy provided to patient  Coping skills reviewed with patient  Portions of the record may have been created with voice recognition software  Occasional wrong word or "sound a like" substitutions may have occurred due to the inherent limitations of voice recognition software   Read the chart carefully and recognize, using context, where substitutions have occurred  There may be translation, syntax,  or grammatical errors  If you have any questions, please contact the dictating provider

## 2017-10-14 NOTE — PROGRESS NOTES
Out in the group, but very quiet but cooperative  Patient still paranoid and delusional  Taking medications as ordered   Seen by SOD , insulin med changes

## 2017-10-14 NOTE — PROGRESS NOTES
Patient continues with chronic delusional ideas but despite this he is pleasant, polite and courteous

## 2017-10-14 NOTE — PROGRESS NOTES
IM Consult Progress Note   Unit/Bed#: NW7 759-01 Encounter: [id number]  SOD Team C       Charline Clark 55 y o  male [address] Stay Days: 32      Assessment/Plan:    Principal Problem:    Schizophrenia ([address] )  Active Problems:    Benign essential HTN    Prediabetes    HLD (hyperlipidemia)    Tobacco dependence    Atypical psychosis    Hyperglycemia    Hyponatremia    Neurosyphilis/psychosis  - Completed penicillin in 2017     - Patient still having delusions, but pleasant and cooperative  - Per ID, will need repeat INR from 2017 at 6, 12, and 24 months  - No further abx at this time  - Treatment per psych    HTN  - BP still elevated this AM at 668 systolic  - Increase chlorthalidone to 50 mg daily  - Continue amlodipine 10 mg daily and lisinopril 20 mg daily    HLD  - Continue daily atorvastatin    Prediabetes w/ hyperglycemia  - A1C 6 1 in 2017    - Suspect hyperglycemia 2/2 abilify  - Continue metformin 750 mg daily, increase as needed  - Continue SSI, alg 3 with meals and Alg 1 at bedtime    Hyponatremia (improved)  - Na 135 on 10/13  - Urine osm normal    - Suspect 2/2 chlorthalidone  - Monitor BMP's    Schizophrenia  - Per psych    Hep C  - No treatment for this hospitalization, previous RUQ normal  - Outpatient follow up    Disposition: Per psych       Subjective:   Patient has no complaints today  He denies pain, says he feels good  He knows he is in [facility] and is oriented to the year  He denies shortness of breath, dizziness, lightheadedness, nausea, vomiting  Per Psychiatry, he is still having chronic delusions, but is otherwise pleasant and cooperative         Vitals: Temp (24hrs), Av 3 °F (36 8 °C), Min:98 1 °F (36 7 °C), Max:98 4 °F (36 9 °C)  Current: Temperature: 98 1 °F (36 7 °C)  Vitals:    10/13/17 1028 10/13/17 1154 10/13/17 1558 10/14/17 0729   BP: 168/84 143/64 151/69 164/86   Pulse: 89 74 68 79   Resp:  16 16 16   Temp:   98 4 °F (36 9 °C) 98 1 °F (36 7 °C)   TempSrc:   Oral Oral   Weight:       Height:        Body mass index is 20 05 kg/m²  No intake/output data recorded  Physical Exam: General appearance: alert, appears stated age, cooperative and no distress  Lungs: clear to auscultation bilaterally  Heart: regular rate and rhythm, S1, S2 normal, no murmur, click, rub or gallop  Abdomen: soft, non-tender; bowel sounds normal; no masses,  no organomegaly     Invasive Devices          No matching active lines, drains, or airways           Labs:   Recent Results (from the past 24 hour(s))   Fingerstick Glucose (POCT)    Collection Time: 10/13/17 12:11 PM   Result Value Ref Range    POC Glucose 308 (H) 65 - 140 mg/dl   Fingerstick Glucose (POCT)    Collection Time: 10/13/17  5:12 PM   Result Value Ref Range    POC Glucose 232 (H) 65 - 140 mg/dl   Fingerstick Glucose (POCT)    Collection Time: 10/13/17  8:51 PM   Result Value Ref Range    POC Glucose 285 (H) 65 - 140 mg/dl   Fingerstick Glucose (POCT)    Collection Time: 10/14/17  2:15 AM   Result Value Ref Range    POC Glucose 218 (H) 65 - 140 mg/dl   Fingerstick Glucose (POCT)    Collection Time: 10/14/17  8:26 AM   Result Value Ref Range    POC Glucose 196 (H) 65 - 140 mg/dl       Radiology Results: I have personally reviewed pertinent reports  Other Diagnostic Testing:   I have personally reviewed pertinent reports          Active Meds:   Current Facility-Administered Medications   Medication Dose Route Frequency    acetaminophen (TYLENOL) tablet 650 mg  650 mg Oral Q6H PRN    aluminum-magnesium hydroxide-simethicone (MYLANTA) 200-200-20 mg/5 mL oral suspension 30 mL  30 mL Oral Q4H PRN    amLODIPine (NORVASC) tablet 10 mg  10 mg Oral Daily    ARIPiprazole (ABILIFY) tablet 15 mg  15 mg Oral Daily    benztropine (COGENTIN) injection 1 mg  1 mg Intramuscular Q6H PRN    benztropine (COGENTIN) tablet 0 5 mg  0 5 mg Oral BID    benztropine (COGENTIN) tablet 1 mg  1 mg Oral Q6H PRN    chlorthalidone tablet 25 mg  25 mg Oral Daily    haloperidol (HALDOL) tablet 2 mg  2 mg Oral BID    haloperidol (HALDOL) tablet 5 mg  5 mg Oral Q6H PRN    haloperidol lactate (HALDOL) injection 5 mg  5 mg Intramuscular Q6H PRN    ibuprofen (MOTRIN) tablet 600 mg  600 mg Oral Q8H PRN    insulin lispro (HumaLOG) 100 units/mL subcutaneous injection 1-5 Units  1-5 Units Subcutaneous 0200    insulin lispro (HumaLOG) 100 units/mL subcutaneous injection 1-6 Units  1-6 Units Subcutaneous TID AC    lisinopril (ZESTRIL) tablet 20 mg  20 mg Oral Daily    LORazepam (ATIVAN) 2 mg/mL injection 1 mg  1 mg Intramuscular Q6H PRN    LORazepam (ATIVAN) tablet 1 mg  1 mg Oral Q6H PRN    magnesium hydroxide (MILK OF MAGNESIA) 400 mg/5 mL oral suspension 30 mL  30 mL Oral Daily PRN    metFORMIN (GLUCOPHAGE-XR) 24 hr tablet 1,000 mg  1,000 mg Oral BID With Meals    nicotine (NICODERM CQ) 14 mg/24hr TD 24 hr patch 1 patch  1 patch Transdermal Daily    risperiDONE (RisperDAL M-TABS) dispersible tablet 1 mg  1 mg Oral Q3H PRN    traZODone (DESYREL) tablet 50 mg  50 mg Oral HS PRN         VTE Pharmacologic Prophylaxis: Reason for no pharmacologic prophylaxis psych floor  VTE Mechanical Prophylaxis: reason for no mechanical VTE prophylaxis low risk; psych floor    [employer], DO

## 2017-10-14 NOTE — PROGRESS NOTES
C/O" My daughter is a  and she put me mundo "    Report from staff regarding this patient received and record reviewed  prior to seeing this patient   Behavior over the last 24 hours:    Sleep: Ok  Appetite:Ok  Medication side effects:denied  ROS:paarnoid  Mental Status Evaluation:  Appearance:  Dressed appropraitely   Behavior:  cooperative   Speech:  normal   Mood:  euthymic   Affect:   blunted   Thought Process:  Goal directed   Thought Content:  normal   Perceptual Disturbances: Denied AV hallucination   Risk Potential: NO PETRA    Sensorium:  normal   Cognition:  intact   Consciousness:  Alert, OX3   Attention: Fair   Insight:  fair   Judgment: fair   Gait/Station: With in normal range   Motor Activity: With in normal range     Progress Toward Goals: working on current treatment goals, no changes  Made in treatment plan   Recommended Treatment: Continue with group therapy, milieu therapy and occupational therapy  Risks, benefits and possible side effects of Medications:   Risks, benefits, and possible side effects of medications explained to patient and patient verbalizes understanding        Medications:   current meds:   Current Facility-Administered Medications   Medication Dose Route Frequency    acetaminophen (TYLENOL) tablet 650 mg  650 mg Oral Q6H PRN    aluminum-magnesium hydroxide-simethicone (MYLANTA) 200-200-20 mg/5 mL oral suspension 30 mL  30 mL Oral Q4H PRN    amLODIPine (NORVASC) tablet 10 mg  10 mg Oral Daily    ARIPiprazole (ABILIFY) tablet 15 mg  15 mg Oral Daily    benztropine (COGENTIN) injection 1 mg  1 mg Intramuscular Q6H PRN    benztropine (COGENTIN) tablet 0 5 mg  0 5 mg Oral BID    benztropine (COGENTIN) tablet 1 mg  1 mg Oral Q6H PRN    chlorthalidone tablet 25 mg  25 mg Oral Daily    haloperidol (HALDOL) tablet 2 mg  2 mg Oral BID    haloperidol (HALDOL) tablet 5 mg  5 mg Oral Q6H PRN    haloperidol lactate (HALDOL) injection 5 mg  5 mg Intramuscular Q6H PRN    ibuprofen (MOTRIN) tablet 600 mg  600 mg Oral Q8H PRN    insulin lispro (HumaLOG) 100 units/mL subcutaneous injection 1-5 Units  1-5 Units Subcutaneous HS    insulin lispro (HumaLOG) 100 units/mL subcutaneous injection 1-6 Units  1-6 Units Subcutaneous TID AC    lisinopril (ZESTRIL) tablet 20 mg  20 mg Oral Daily    LORazepam (ATIVAN) 2 mg/mL injection 1 mg  1 mg Intramuscular Q6H PRN    LORazepam (ATIVAN) tablet 1 mg  1 mg Oral Q6H PRN    magnesium hydroxide (MILK OF MAGNESIA) 400 mg/5 mL oral suspension 30 mL  30 mL Oral Daily PRN    metFORMIN (GLUCOPHAGE-XR) 24 hr tablet 1,000 mg  1,000 mg Oral BID With Meals    nicotine (NICODERM CQ) 14 mg/24hr TD 24 hr patch 1 patch  1 patch Transdermal Daily    risperiDONE (RisperDAL M-TABS) dispersible tablet 1 mg  1 mg Oral Q3H PRN    traZODone (DESYREL) tablet 50 mg  50 mg Oral HS PRN     Labs: NA    Assessment, Diagnosis  and Plan: continue with current meds and goals, F/U tomorrow    Counseling / Coordination of Care  Total floor / unit time spent today20 minutes  minutes  Greater than 50% of total time was spent with the patient and / or family counseling and / or coordination of care  A description of the counseling / coordination of care:      Shirley Joy MD

## 2017-10-15 LAB
ANION GAP SERPL CALCULATED.3IONS-SCNC: 6 MMOL/L (ref 4–13)
BUN SERPL-MCNC: 25 MG/DL (ref 5–25)
CALCIUM SERPL-MCNC: 8.9 MG/DL (ref 8.3–10.1)
CHLORIDE SERPL-SCNC: 97 MMOL/L (ref 100–108)
CO2 SERPL-SCNC: 29 MMOL/L (ref 21–32)
CREAT SERPL-MCNC: 1.08 MG/DL (ref 0.6–1.3)
GFR SERPL CREATININE-BSD FRML MDRD: 77 ML/MIN/1.73SQ M
GLUCOSE P FAST SERPL-MCNC: 227 MG/DL (ref 65–99)
GLUCOSE SERPL-MCNC: 201 MG/DL (ref 65–140)
GLUCOSE SERPL-MCNC: 227 MG/DL (ref 65–140)
GLUCOSE SERPL-MCNC: 233 MG/DL (ref 65–140)
GLUCOSE SERPL-MCNC: 244 MG/DL (ref 65–140)
GLUCOSE SERPL-MCNC: 247 MG/DL (ref 65–140)
POTASSIUM SERPL-SCNC: 4.3 MMOL/L (ref 3.5–5.3)
SODIUM SERPL-SCNC: 132 MMOL/L (ref 136–145)

## 2017-10-15 PROCEDURE — 82948 REAGENT STRIP/BLOOD GLUCOSE: CPT

## 2017-10-15 PROCEDURE — 80048 BASIC METABOLIC PNL TOTAL CA: CPT | Performed by: INTERNAL MEDICINE

## 2017-10-15 RX ORDER — CHLORTHALIDONE 25 MG/1
50 TABLET ORAL DAILY
Status: DISCONTINUED | OUTPATIENT
Start: 2017-10-15 | End: 2017-10-19 | Stop reason: HOSPADM

## 2017-10-15 RX ADMIN — CHLORTHALIDONE 50 MG: 25 TABLET ORAL at 09:05

## 2017-10-15 RX ADMIN — INSULIN LISPRO 3 UNITS: 100 INJECTION, SOLUTION INTRAVENOUS; SUBCUTANEOUS at 17:28

## 2017-10-15 RX ADMIN — METFORMIN HYDROCHLORIDE 1000 MG: 500 TABLET, EXTENDED RELEASE ORAL at 17:26

## 2017-10-15 RX ADMIN — INSULIN LISPRO 3 UNITS: 100 INJECTION, SOLUTION INTRAVENOUS; SUBCUTANEOUS at 13:00

## 2017-10-15 RX ADMIN — BENZTROPINE MESYLATE 0.5 MG: 0.5 TABLET ORAL at 08:17

## 2017-10-15 RX ADMIN — HALOPERIDOL 2 MG: 2 TABLET ORAL at 17:27

## 2017-10-15 RX ADMIN — AMLODIPINE BESYLATE 10 MG: 10 TABLET ORAL at 08:17

## 2017-10-15 RX ADMIN — NICOTINE 1 PATCH: 14 PATCH, EXTENDED RELEASE TRANSDERMAL at 08:25

## 2017-10-15 RX ADMIN — HALOPERIDOL 2 MG: 2 TABLET ORAL at 08:22

## 2017-10-15 RX ADMIN — INSULIN LISPRO 2 UNITS: 100 INJECTION, SOLUTION INTRAVENOUS; SUBCUTANEOUS at 08:26

## 2017-10-15 RX ADMIN — BENZTROPINE MESYLATE 0.5 MG: 0.5 TABLET ORAL at 17:27

## 2017-10-15 RX ADMIN — LISINOPRIL 20 MG: 20 TABLET ORAL at 08:18

## 2017-10-15 RX ADMIN — ARIPIPRAZOLE 15 MG: 15 TABLET ORAL at 08:17

## 2017-10-15 RX ADMIN — METFORMIN HYDROCHLORIDE 1000 MG: 500 TABLET, EXTENDED RELEASE ORAL at 08:17

## 2017-10-15 NOTE — PROGRESS NOTES
C/O" Attened all groups, took meds, slept well "    Report from staff regarding this patient received and record reviewed  prior to seeing this patient   Behavior over the last 24 hours:    Sleep:Good  Appetite:good  Medication side effects:none  ROS:improving   Mental Status Evaluation:  Appearance:  Dressed appropraitely   Behavior:  cooperative   Speech:  normal   Mood:  euthymic   Affect:  appropriate    Thought Process:  Goal directed   Thought Content:  normal   Perceptual Disturbances: Denied AV hallucination   Risk Potential: NO PETRA    Sensorium:  normal   Cognition:  intact   Consciousness:  Alert, OX3   Attention: Fair   Insight:  fair   Judgment: fair   Gait/Station: With in normal range   Motor Activity: With in normal range     Progress Toward Goals: working on current treatment goals, no changes  Made in treatment plan   Recommended Treatment: Continue with group therapy, milieu therapy and occupational therapy  Risks, benefits and possible side effects of Medications:   Risks, benefits, and possible side effects of medications explained to patient and patient verbalizes understanding        Medications:   current meds:   Current Facility-Administered Medications   Medication Dose Route Frequency    acetaminophen (TYLENOL) tablet 650 mg  650 mg Oral Q6H PRN    aluminum-magnesium hydroxide-simethicone (MYLANTA) 200-200-20 mg/5 mL oral suspension 30 mL  30 mL Oral Q4H PRN    amLODIPine (NORVASC) tablet 10 mg  10 mg Oral Daily    ARIPiprazole (ABILIFY) tablet 15 mg  15 mg Oral Daily    benztropine (COGENTIN) injection 1 mg  1 mg Intramuscular Q6H PRN    benztropine (COGENTIN) tablet 0 5 mg  0 5 mg Oral BID    benztropine (COGENTIN) tablet 1 mg  1 mg Oral Q6H PRN    chlorthalidone tablet 50 mg  50 mg Oral Daily    haloperidol (HALDOL) tablet 2 mg  2 mg Oral BID    haloperidol (HALDOL) tablet 5 mg  5 mg Oral Q6H PRN    haloperidol lactate (HALDOL) injection 5 mg  5 mg Intramuscular Q6H PRN    ibuprofen (MOTRIN) tablet 600 mg  600 mg Oral Q8H PRN    insulin lispro (HumaLOG) 100 units/mL subcutaneous injection 1-5 Units  1-5 Units Subcutaneous HS    insulin lispro (HumaLOG) 100 units/mL subcutaneous injection 1-6 Units  1-6 Units Subcutaneous TID AC    lisinopril (ZESTRIL) tablet 20 mg  20 mg Oral Daily    LORazepam (ATIVAN) 2 mg/mL injection 1 mg  1 mg Intramuscular Q6H PRN    LORazepam (ATIVAN) tablet 1 mg  1 mg Oral Q6H PRN    magnesium hydroxide (MILK OF MAGNESIA) 400 mg/5 mL oral suspension 30 mL  30 mL Oral Daily PRN    metFORMIN (GLUCOPHAGE-XR) 24 hr tablet 1,000 mg  1,000 mg Oral BID With Meals    nicotine (NICODERM CQ) 14 mg/24hr TD 24 hr patch 1 patch  1 patch Transdermal Daily    risperiDONE (RisperDAL M-TABS) dispersible tablet 1 mg  1 mg Oral Q3H PRN    traZODone (DESYREL) tablet 50 mg  50 mg Oral HS PRN     Labs: NA    Assessment, Diagnosis  and Plan: continue with current meds and goals, F/U tomorrow    Counseling / Coordination of Care  Total floor / unit time spent today15 minutes  minutes  Greater than 50% of total time was spent with the patient and / or family counseling and / or coordination of care  A description of the counseling / coordination of care:      Kaylin Ty MD

## 2017-10-16 PROBLEM — E11.9 TYPE 2 DIABETES MELLITUS (HCC): Chronic | Status: ACTIVE | Noted: 2017-07-20

## 2017-10-16 LAB
ANION GAP SERPL CALCULATED.3IONS-SCNC: 8 MMOL/L (ref 4–13)
BUN SERPL-MCNC: 20 MG/DL (ref 5–25)
CALCIUM SERPL-MCNC: 8.5 MG/DL (ref 8.3–10.1)
CHLORIDE SERPL-SCNC: 97 MMOL/L (ref 100–108)
CO2 SERPL-SCNC: 28 MMOL/L (ref 21–32)
CREAT SERPL-MCNC: 0.89 MG/DL (ref 0.6–1.3)
GFR SERPL CREATININE-BSD FRML MDRD: 96 ML/MIN/1.73SQ M
GLUCOSE P FAST SERPL-MCNC: 168 MG/DL (ref 65–99)
GLUCOSE SERPL-MCNC: 168 MG/DL (ref 65–140)
GLUCOSE SERPL-MCNC: 171 MG/DL (ref 65–140)
GLUCOSE SERPL-MCNC: 199 MG/DL (ref 65–140)
GLUCOSE SERPL-MCNC: 210 MG/DL (ref 65–140)
GLUCOSE SERPL-MCNC: 220 MG/DL (ref 65–140)
POTASSIUM SERPL-SCNC: 3.8 MMOL/L (ref 3.5–5.3)
SODIUM SERPL-SCNC: 133 MMOL/L (ref 136–145)

## 2017-10-16 PROCEDURE — 82948 REAGENT STRIP/BLOOD GLUCOSE: CPT

## 2017-10-16 PROCEDURE — 80048 BASIC METABOLIC PNL TOTAL CA: CPT | Performed by: INTERNAL MEDICINE

## 2017-10-16 RX ORDER — ARIPIPRAZOLE 10 MG/1
20 TABLET ORAL DAILY
Status: DISCONTINUED | OUTPATIENT
Start: 2017-10-17 | End: 2017-10-19 | Stop reason: HOSPADM

## 2017-10-16 RX ADMIN — BENZTROPINE MESYLATE 0.5 MG: 0.5 TABLET ORAL at 08:16

## 2017-10-16 RX ADMIN — NICOTINE 1 PATCH: 14 PATCH, EXTENDED RELEASE TRANSDERMAL at 08:18

## 2017-10-16 RX ADMIN — AMLODIPINE BESYLATE 10 MG: 10 TABLET ORAL at 08:17

## 2017-10-16 RX ADMIN — CHLORTHALIDONE 50 MG: 25 TABLET ORAL at 08:17

## 2017-10-16 RX ADMIN — INSULIN LISPRO 2 UNITS: 100 INJECTION, SOLUTION INTRAVENOUS; SUBCUTANEOUS at 12:38

## 2017-10-16 RX ADMIN — LISINOPRIL 20 MG: 20 TABLET ORAL at 08:16

## 2017-10-16 RX ADMIN — BENZTROPINE MESYLATE 0.5 MG: 0.5 TABLET ORAL at 17:41

## 2017-10-16 RX ADMIN — METFORMIN HYDROCHLORIDE 1000 MG: 500 TABLET, EXTENDED RELEASE ORAL at 08:18

## 2017-10-16 RX ADMIN — INSULIN LISPRO 1 UNITS: 100 INJECTION, SOLUTION INTRAVENOUS; SUBCUTANEOUS at 17:44

## 2017-10-16 RX ADMIN — HALOPERIDOL 2 MG: 2 TABLET ORAL at 08:17

## 2017-10-16 RX ADMIN — INSULIN LISPRO 2 UNITS: 100 INJECTION, SOLUTION INTRAVENOUS; SUBCUTANEOUS at 08:19

## 2017-10-16 RX ADMIN — METFORMIN HYDROCHLORIDE 1000 MG: 500 TABLET, EXTENDED RELEASE ORAL at 17:00

## 2017-10-16 RX ADMIN — ARIPIPRAZOLE 15 MG: 15 TABLET ORAL at 08:17

## 2017-10-16 RX ADMIN — HALOPERIDOL 2 MG: 2 TABLET ORAL at 17:41

## 2017-10-16 RX ADMIN — SITAGLIPTIN 50 MG: 50 TABLET, FILM COATED ORAL at 08:20

## 2017-10-16 NOTE — PLAN OF CARE
Problem: Ineffective Coping  Goal: Participates in unit activities  Interventions:  - Provide therapeutic environment   - Provide required programming   - Redirect inappropriate behaviors      Goal extended on 10/12/2017   Outcome: Progressing

## 2017-10-16 NOTE — PROGRESS NOTES
[address]    Internal Medicine Progress Note: Blanca Park 55 y o  male MRN: [id number]  Unit/Bed#: KA4 759-01 Encounter: [id number]  Code Status: Level 1 - Full Code    HPI/24hr events: Patient seen and examined, he has just come back from his morning shower and reports that he is feeling well today  He is anticipating discharge to home later today  He was started on Metformin earlier this admission and has been tolerating it well  It was titrated up and he denies any GI symptoms with the increased dose  He has been on ISS during this admission for coverage while titrating his oral medications and he will likely not require insulin at d/c  Due to persistent hyperglycemia possibly in part to his other medications we can add an additional oral agent and he is amenable as he would like to avoid insulin or injectables at d/c  Review of Systems   Constitutional: Negative for chills and fever  HENT: Negative  Respiratory: Negative  Negative for shortness of breath and wheezing  Cardiovascular: Negative for chest pain and palpitations  Gastrointestinal: Negative  Endocrine: Negative  Musculoskeletal: Negative  Skin: Negative  Neurological: Negative  Psychiatric/Behavioral: Positive for dysphoric mood  All other systems reviewed and are negative  Vitals:   Vitals:    10/15/17 0725 10/15/17 1506 10/16/17 0739 10/16/17 0816   BP: 151/74 130/70 133/75 133/75   Pulse: 77 79 81    Resp: 16 16 16    Temp: 97 6 °F (36 4 °C) 98 1 °F (36 7 °C) 98 1 °F (36 7 °C)    TempSrc: Oral Oral Oral    Weight:       Height:         Temp  Min: 97 2 °F (36 2 °C)  Max: 98 7 °F (37 1 °C)  IBW: 82 2 kg  Body mass index is 20 05 kg/m²      Temp (24hrs), Av 1 °F (36 7 °C), Min:98 1 °F (36 7 °C), Max:98 1 °F (36 7 °C)    Intake and Outputs:No intake or output data in the 24 hours ending 10/16/17 0830  No intake/output data recorded  Nutrition:        Diet Orders            Start     Ordered    10/11/17 2236  Diet Alex/CHO Controlled; Consistent Carbohydrate Diet Level 1 (4 carb servings/60 grams CHO/meal)  Diet effective now     Question Answer Comment   Diet Type Alex/CHO Controlled    Alex/CHO Controlled Consistent Carbohydrate Diet Level 1 (4 carb servings/60 grams CHO/meal)    RD to adjust diet per protocol? Yes        10/11/17 2236        Lab:     Results from last 7 days  Lab Units 10/10/17  0645   WBC Thousand/uL 6 09   HEMOGLOBIN g/dL 15 2   HEMATOCRIT % 44 8   PLATELETS Thousands/uL 291   NEUTROS PCT % 41*   MONOS PCT % 8       Results from last 7 days  Lab Units 10/16/17  0631 10/15/17  0627 10/13/17  0640   SODIUM mmol/L 133* 132* 135*   POTASSIUM mmol/L 3 8 4 3 4 2   CHLORIDE mmol/L 97* 97* 99*   CO2 mmol/L 28 29 30   BUN mg/dL 20 25 22   CREATININE mg/dL 0 89 1 08 0 92   CALCIUM mg/dL 8 5 8 9 8 4   GLUCOSE RANDOM mg/dL 168* 227* 158*         Imaging: No new imaging overnight     Allergies: No Known Allergies    Medications:   Scheduled Meds:  amLODIPine 10 mg Oral Daily   ARIPiprazole 15 mg Oral Daily   benztropine 0 5 mg Oral BID   chlorthalidone 50 mg Oral Daily   haloperidol 2 mg Oral BID   insulin lispro 1-5 Units Subcutaneous HS   insulin lispro 1-6 Units Subcutaneous TID AC   lisinopril 20 mg Oral Daily   metFORMIN 1,000 mg Oral BID With Meals   nicotine 1 patch Transdermal Daily   sitaGLIPtin 50 mg Oral Daily     Continuous Infusions:   PRN Meds    acetaminophen    aluminum-magnesium hydroxide-simethicone    benztropine    benztropine    haloperidol    haloperidol lactate    ibuprofen    LORazepam    LORazepam    magnesium hydroxide    risperiDONE    traZODone    Invasive lines and devices:   Invasive Devices          No matching active lines, drains, or airways        Physical Exam:  Constitutional: Appears stated age, no acute distress, ambulates without difficulty  HEENT: Normocephalic and atraumatic, MMM   Eyes: PEARRL  Neck: Neck supple, trachea midline, no JVD  Cardiovascular: Regular rate and rhythm, no murmurs, rubs or gallops  Respiratory: Clear to auscultation bilaterally, no rales, rhonchi or wheeze   Abdomen: soft, not distended, non-tender  Musculoskeletal: Normal muscle bulk and tone, no LE edema  Neurological: patient is awake and alert, answers questions appropriately  Skin: Skin is warm and dry     Psychiatric: continues to have blunted affect    Impression:  Patient Active Problem List   Diagnosis    Paranoia ([facility] [address] )    Neurosyphilis in adult    Benign essential HTN    Prediabetes    HLD (hyperlipidemia)    Elevated liver enzymes    Tobacco dependence    Schizophrenia ([facility] [address] )    Hepatitis C    Atypical psychosis    Hyperglycemia    Hyponatremia     A/P: 55 Y/OM with hx recently treated neurosyphilis, diabetes, hepatitis C and HTN being seen in consultation follow-up     Hyponatremia  -[Na] today 133 from 131 10/10, new since 10/10, Chlorthaladone started this admission, this may be 2/2 Chlorthaladone vs psychiatric medications, consider reduction of dose or d/c Chlorthalidone if symptomatic     Neurosyphilis  -previously confirmed with serum and CSF (9/17)  -HIV negative (7/17)  -has completed course of IV Penicilin ending 8/3/17 and 3 weeks of IM penicillin weekly dosing completed 10/6/17  -ID previously recommended follow-up [facility] at 6/12/24 months from Aug 2017 and will need to follow-up with [facility] at d/c     Chronic Hepatitis B and C  -RUQ US revealed mild hepatomegaly   -will need o/p f/u with GI for treatment       HTN  -now better controlled with Chlorthalidone, Lisinopril and Amlodipine     DM-II  -HbA1c 6 1 in July, now 7 7, started on Metformin and titrated up to 1000mg BID with no notable GI adverse  -due to persistent hyperglycemia in high 100s, low 200s will require additional agent, start Sitagliptin 50mg daily, hesitant to use sulfonylurea or insulin as unsure if pt would be able to manage hypoglycemia and would like to utilize safest regimen possible     Caroline Blevins DO  Internal Medicine PGY-3  10/16/2017 8:30 AM

## 2017-10-16 NOTE — PROGRESS NOTES
Pt is out in the milieu  Pt denies all symptoms  Pt still thinks daughter is after his money  Handout given on Januvia

## 2017-10-16 NOTE — DISCHARGE INSTRUCTIONS
Type 2 Diabetes in Adults   WHAT YOU NEED TO KNOW:   What is type 2 diabetes? Type 2 diabetes is a disease that affects how your body uses glucose (sugar)  Normally, when the blood sugar level increases, the pancreas makes more insulin  Insulin helps move sugar out of the blood so it can be used for energy  Type 2 diabetes develops because either the body cannot make enough insulin, or it cannot use the insulin correctly  After many years, your pancreas may stop making insulin  What increases my risk for type 2 diabetes? · Obesity    · Physical inactivity    · Older age    · High blood pressure or high cholesterol    · A history of heart disease, gestational diabetes, or polycystic ovary syndrome     · A family member with diabetes    · Being [place] American, , , [place], or [facility]  What are the signs and symptoms of type 2 diabetes? You may have high blood sugar levels for a long time before symptoms appear  You may have any of the following:  · More hunger or thirst than usual     · Frequent urination     · Weight loss without trying     · Blurred vision  How is type 2 diabetes diagnosed? You may need tests to check for type 2 diabetes starting at age 39  You may need any of the following:  · An A1c test  shows the average amount of sugar in your blood over the past 2 to 3 months  Your healthcare provider will tell you the A1c level that is right for you  The goal for your A1c is usually below 7%  Your provider can help you make changes if a check shows the A1c is too high  · A fasting plasma glucose test  is when your blood sugar level is tested after you have not eaten for 8 hours  · A 2-hour plasma glucose test  starts with a blood sugar level check after you have not eaten for 8 hours  You are then given a glucose drink  Your blood sugar level is checked after 2 hours       · A random glucose test  may be done any time of day, no matter how long ago you ate   How is type 2 diabetes treated? Type 2 diabetes can be controlled to prevent damage to your heart, blood vessels, and other organs  The goal is to keep your blood sugar at a normal level  You must eat the right foods, and exercise regularly  You may need 1 or more hypoglycemic medicines or insulin if you cannot control your blood sugar level with nutrition and exercise  You may also need medicine to lower your risk for heart disease  An example includes medicine to lower or control your cholesterol  How do I check my blood sugar level? You will be taught how to check a small drop of blood in a glucose monitor  You will need to check your blood sugar level at least 3 times each day if you are on insulin  Ask your healthcare provider when and how often to check during the day  If you check your blood sugar level before a meal , it should be between 80 and 130 mg/dL  If you check your blood sugar level 1 to 2 hours after a meal , it should be less than 180 mg/dL  Ask your healthcare provider if these are good goals for you  Write down your results, and show them to your healthcare provider  Your provider may use the results to make changes to your medicine, food, and exercise schedules  What should I do if my blood sugar level is too low? Your blood sugar level is too low if it goes below 70 mg/dL  If the level is too low, eat or drink 15 grams of fast-acting carbohydrate  These are found naturally in fruits  Fast-acting carbohydrates will raise your blood sugar level quickly  Examples of 15 grams of fast-acting carbohydrate are 4 ounces (½ cup) of fruit juice or 4 ounces of regular soda  Other examples are 2 tablespoons of raisins or 3 to 4 glucose tablets  Check your blood sugar level 15 minutes later  If the level is still low (less than 100 mg/dL), eat another 15 grams of carbohydrate  When the level returns to 100 mg/dL, eat a snack or meal that contains carbohydrates   This will help prevent another drop in blood sugar  Always carefully follow your healthcare provider's instructions on how to treat low blood sugar levels  What do I need to know about nutrition? A dietitian will help you make a meal plan to keep your blood sugar level steady  Do not skip meals  Your blood sugar level may drop too low if you have taken diabetes medicine and do not eat  · Keep track of carbohydrates (sugar and starchy foods)  Your blood sugar level can get too high if you eat too many carbohydrates  Eat fruits, legumes, vegetables, and whole grains  Your dietitian will help you plan meals and snacks that have the right amount of carbohydrates  · Eat low-fat foods , such as skinless chicken and low-fat milk  · Eat less sodium (salt)  Limit high-sodium foods, such as soy sauce, potato chips, and soup  Do not add salt to food you cook  Limit your use of table salt  You should have less than 2,300 mg of sodium per day  · Eat high-fiber foods , such as vegetables, whole-grain breads, and beans  · Limit alcohol  Alcohol affects your blood sugar level and can make it harder to manage your diabetes  Limit alcohol to 1 drink a day if you are a woman  Limit alcohol to 2 drinks a day if you are a man  A drink of alcohol is 12 ounces of beer, 5 ounces of wine, or 1½ ounces of liquor  How much exercise do I need? Exercise can help keep your blood sugar level steady, decrease your risk of heart disease, and help you lose weight  Stretch before and after you exercise  Exercise for at least 150 minutes every week  Spread this amount of exercise over at least 3 days a week  Do not skip exercise more than 2 days in a row  Include muscle strengthening activities 2 to 3 days each week  Older adults should include balance training 2 to 3 times each week  Activities that help increase balance include yoga and hanane chi  Work with your healthcare provider to create an exercise plan    · Check your blood sugar level before and after exercise  Healthcare providers may tell you to change the amount of insulin you take or food you eat  If your blood sugar level is high, check your blood or urine for ketones before you exercise  Do not exercise if your blood sugar level is high and you have ketones  · If your blood sugar level is less than 100 mg/dL, have a carbohydrate snack before you exercise  Examples are 4 to 6 crackers, ½ banana, 8 ounces (1 cup) of milk, or 4 ounces (½ cup) of juice  Drink water or liquids that do not contain sugar before, during, and after exercise  Ask your dietitian or healthcare provider which liquids you should drink when you exercise  · Do not sit for longer than 30 minutes  If you cannot walk around, at least stand up  This will help you stay active and keep your blood circulating  What else can I do to manage type 2 diabetes? · Check your feet each day for sores  Wear shoes and socks that fit correctly  Do not trim your toenails  Ask your healthcare provider for more information about foot care  · Maintain a healthy weight  Ask your healthcare provider how much you should weigh  A healthy weight can help you control your diabetes and prevent heart disease  Ask your provider to help you create a weight loss plan if you are overweight  Together you can set manageable weight loss goals  · Do not smoke  Nicotine and other chemicals in cigarettes and cigars can cause lung damage and make it more difficult to manage your diabetes  Ask your healthcare provider for information if you currently smoke and need help to quit  Do not use e-cigarettes or smokeless tobacco in place of cigarettes or to help you quit  They still contain nicotine  · Check your blood pressure as directed  Ask your healthcare provider what your blood pressure should be  Most adults with diabetes and high blood pressure should have a systolic blood pressure (first number) less than 140   Your diastolic blood pressure (second number) should be less than 90  · Wear medical alert identification  Wear medical alert jewelry or carry a card that says you have diabetes  Ask your healthcare provider where to get these items  · Ask about vaccines  You have a higher risk for serious illness if you get the flu, pneumonia, or hepatitis  Ask your healthcare provider if you should get a flu, pneumonia, or hepatitis B vaccine, and when to get the vaccine  What are the risks of type 2 diabetes? Uncontrolled diabetes can damage your nerves, veins, and arteries  High blood sugar levels may damage other body tissue and organs over time  Damage to arteries may increase your risk for heart attack and stroke  Nerve damage may also lead to other heart, stomach, and nerve problems  Diabetes is life-threatening if it is not controlled  Control your blood glucose levels to prevent health problems  Call 911 for any of the following:   · You have any of the following signs of a stroke:      ¨ Numbness or drooping on one side of your face     ¨ Weakness in an arm or leg    ¨ Confusion or difficulty speaking    ¨ Dizziness, a severe headache, or vision loss    · You have any of the following signs of a heart attack:      ¨ Squeezing, pressure, or pain in your chest that lasts longer than 5 minutes or returns    ¨ Discomfort or pain in your back, neck, jaw, stomach, or arm     ¨ Trouble breathing    ¨ Nausea or vomiting    ¨ Lightheadedness or a sudden cold sweat, especially with chest pain or trouble breathing  When should I seek immediate care? · You have severe abdominal pain, or the pain spreads to your back  You may also be vomiting  · You have trouble staying awake or focusing  · You are shaking or sweating  · You have blurred or double vision  · Your breath has a fruity, sweet smell  · Your breathing is deep and labored, or rapid and shallow  · Your heartbeat is fast and weak    When should I contact my healthcare provider? · You are vomiting or have diarrhea  · You have an upset stomach and cannot eat the foods on your meal plan  · You feel weak or more tired than usual      · You feel dizzy, have headaches, or are easily irritated  · Your skin is red, warm, dry, or swollen  · You have a wound that does not heal      · You have numbness in your arms or legs  · You have trouble coping with your illness, or you feel anxious or depressed  · You have questions or concerns about your condition or care  CARE AGREEMENT:   You have the right to help plan your care  Learn about your health condition and how it may be treated  Discuss treatment options with your caregivers to decide what care you want to receive  You always have the right to refuse treatment  The above information is an  only  It is not intended as medical advice for individual conditions or treatments  Talk to your doctor, nurse or pharmacist before following any medical regimen to see if it is safe and effective for you  © 2017 [address] Information is for End User's use only and may not be sold, redistributed or otherwise used for commercial purposes  All illustrations and images included in CareNotes® are the copyrighted property of A D A M , Inc  or [address]

## 2017-10-16 NOTE — CASE MANAGEMENT
SARA called [facility] and spoke to [facility] regarding the referral for the Marilin Polo said they will accept Manoj Harris and said she must talk to their nurse regarding the intake appointment but will call CM back with appointment  Kamilah's extension is   CM spoke to [facility] and intake appointment with therapist Jamaica Powers is October 23rd at 1400   (noted in AVS)

## 2017-10-16 NOTE — DISCHARGE INSTR - AVS FIRST PAGE
Please make an appointment to follow-up with your primary care doctor at discharge  You should also have an annual diabetic eye exam, please make an appointment with Opthalmology to have this done

## 2017-10-16 NOTE — PROGRESS NOTES
Patient has been in the milieu all evening - he has been watching football on [place]  In the company of mostly male peers  Cooperative with medications including insulin - opportunities over the weekend to educate on medication but he does not seem to be retaining this  Accuchecks 244 and 233 this evening

## 2017-10-17 LAB
GLUCOSE SERPL-MCNC: 110 MG/DL (ref 65–140)
GLUCOSE SERPL-MCNC: 171 MG/DL (ref 65–140)
GLUCOSE SERPL-MCNC: 178 MG/DL (ref 65–140)
GLUCOSE SERPL-MCNC: 184 MG/DL (ref 65–140)

## 2017-10-17 PROCEDURE — 82948 REAGENT STRIP/BLOOD GLUCOSE: CPT

## 2017-10-17 RX ADMIN — NICOTINE 1 PATCH: 14 PATCH, EXTENDED RELEASE TRANSDERMAL at 08:27

## 2017-10-17 RX ADMIN — LISINOPRIL 20 MG: 20 TABLET ORAL at 08:26

## 2017-10-17 RX ADMIN — BENZTROPINE MESYLATE 0.5 MG: 0.5 TABLET ORAL at 18:07

## 2017-10-17 RX ADMIN — AMLODIPINE BESYLATE 10 MG: 10 TABLET ORAL at 08:26

## 2017-10-17 RX ADMIN — CHLORTHALIDONE 50 MG: 25 TABLET ORAL at 08:26

## 2017-10-17 RX ADMIN — HALOPERIDOL 2 MG: 2 TABLET ORAL at 08:26

## 2017-10-17 RX ADMIN — INSULIN LISPRO 1 UNITS: 100 INJECTION, SOLUTION INTRAVENOUS; SUBCUTANEOUS at 08:28

## 2017-10-17 RX ADMIN — BENZTROPINE MESYLATE 0.5 MG: 0.5 TABLET ORAL at 08:27

## 2017-10-17 RX ADMIN — SITAGLIPTIN 50 MG: 50 TABLET, FILM COATED ORAL at 08:27

## 2017-10-17 RX ADMIN — ARIPIPRAZOLE 20 MG: 10 TABLET ORAL at 08:31

## 2017-10-17 RX ADMIN — INSULIN LISPRO 1 UNITS: 100 INJECTION, SOLUTION INTRAVENOUS; SUBCUTANEOUS at 17:05

## 2017-10-17 RX ADMIN — METFORMIN HYDROCHLORIDE 1000 MG: 500 TABLET, EXTENDED RELEASE ORAL at 08:26

## 2017-10-17 RX ADMIN — HALOPERIDOL 2 MG: 2 TABLET ORAL at 18:08

## 2017-10-17 RX ADMIN — METFORMIN HYDROCHLORIDE 1000 MG: 500 TABLET, EXTENDED RELEASE ORAL at 16:49

## 2017-10-17 NOTE — PROGRESS NOTES
Progress Note - Behavioral Health   Jeremías Witt 55 y o  male MRN: @MRN   Unit/Bed#: UI5 759-01 Encounter: [id number]        The patient was seen for continuing care and reviewed with staff  Report from staff regarding this patient received and discussed, and records reviewed prior to seeing this patient   Patient's condition continues to slowly improve  He was still preoccupied with his daughter and paranoid ideations together was ideations of grandeur that he was a successful artist was millions of dollars that his daughter took away from him peers is but did not produce any significant emotional response  The patient stated that he no longer interested in communicating with his daughter  He was supported in his decision to move forward and live his life as he wants to leave  We discussed his meeting was intensive  tomorrow and his agreement to participate in outpatient program which will provide in-home visits as well  The    Behavior over the last 24 hours:  Slightly improvng    Patient remains appropriate, calm, delusional      Sleep: no change  Appetite: improving  Medication side effects: No    No TD like symptoms were noted today  ROS: no complaints    Mental Status Evaluation:  The patient was common polite not in distress moderately anxious but not agitated was normal rate and volume of thoughts good eye contact expressing paranoid ideations without significant emotional component, denied any voices or visual hallucinations  Language was appropriate associations were intact  Denies suicidal homicidal thoughts  Was alert oriented x3  The memory recent was intact  His memory about past was field in was delusional believes  His insight remained restricted but as he is judgment is improving  Laboratory results:  I have personally reviewed all pertinent laboratory results      Progress Toward Goals: Improvement in judgment but not and insight    Assessment/Plan   Principal Problem: Schizophrenia ([facility] [address] )  Active Problems:    Benign essential HTN    Type 2 diabetes mellitus (HCC)    HLD (hyperlipidemia)    Tobacco dependence    Atypical psychosis    Hyperglycemia    Hyponatremia      Recommended Treatment:  Will continue his Abilify back to 20 mg because the patient's condition is started slowly digressing after his Haldol also decreased  continue Haldol to 2  mg bid, pt no longer has transient facial motor movements, resembling TD  Planned medication and treatment changes: All current active medications have been reviewed  Continue treatment with group therapy, milieu therapy, occupational therapy and medication management  Risks / Benefits of Treatment:    Risks, benefits, and possible side effects of medications explained to patient including risk of parkinsonian symptoms, Tardive Dyskinesia and metabolic syndrome related to treatment with antipsychotic medications  The patient verbalizes understanding and agreement for treatment  Counseling / Coordination of Care:    Patient's progress discussed with staff in treatment team meeting  Medications, treatment progress and treatment plan reviewed with patient  Medication education provided to patient  Supportive therapy provided to patient  Coping skills reviewed with patient  Portions of the record may have been created with voice recognition software  Occasional wrong word or "sound a like" substitutions may have occurred due to the inherent limitations of voice recognition software  Read the chart carefully and recognize, using context, where substitutions have occurred  There may be translation, syntax,  or grammatical errors  If you have any questions, please contact the dictating provider  The

## 2017-10-17 NOTE — PROGRESS NOTES
Progress Note - Behavioral Health   Hayes Montana 55 y o  male MRN: @MRN   Unit/Bed#: ZG7 759-01 Encounter: [id number]        The patient was seen for continuing care and reviewed with staff  Report from staff regarding this patient received and discussed, and records reviewed prior to seeing this patient   Patient was slightly more energetic today and concerned about his discharge stating that he has to take money from his bank  His paranoid ideations about his daughter remained the same  We discussed the fact that the patient has to meet with his intensive  for continuation of care  The need for additional days of inpatient unit to read just his medications after his Haldol was decreased due to appearance of tardive dyskinesia type symptoms is necessary to help the patient was chronic paranoid delusion to have better control over his emotions and thoughts  Behavior over the last 24 hours:  Slightly regressing    Patient remains appropriate, calm, delusional      Sleep: no change  Appetite: improving  Medication side effects: No   ROS: no complaints    Mental Status Evaluation:  There was some increase in psychomotor agitation today was more irritable and anxious affect and mood  Speech was relatively fast was low volume  Thoughts pattern was concrete and thought content was related to his need to get money from his bank  Denies suicidal homicidal ideations he still has paranoid ideations about his daughter stealing his money  He had thoughts about taking money from the bank was slightly directly associated to his paranoid the delusion  Memory remote was hard to test because of the patient paranoid ideations about the past   At the same time recent and immediate memory are normal  Patient was fully alert oriented x3  His language was appropriate and associations concrete    His insight and judgment remained very restricted    Laboratory results:  I have personally reviewed all pertinent laboratory results  Progress Toward Goals: Improvement in judgment is no longer evident    Assessment/Plan   Principal Problem:    Schizophrenia ([address] )  Active Problems:    Benign essential HTN    Type 2 diabetes mellitus (HCC)    HLD (hyperlipidemia)    Tobacco dependence    Atypical psychosis    Hyperglycemia    Hyponatremia      Recommended Treatment:  Will increase his Abilify back to 20 mg because the patient's condition is started slowly digressing after his Haldol also decreased  continue Haldol to 2  mg bid, pt no longer has transient facial motor movements, resembling TD  Planned medication and treatment changes: All current active medications have been reviewed  Continue treatment with group therapy, milieu therapy, occupational therapy and medication management  Risks / Benefits of Treatment:    Risks, benefits, and possible side effects of medications explained to patient including risk of parkinsonian symptoms, Tardive Dyskinesia and metabolic syndrome related to treatment with antipsychotic medications  The patient verbalizes understanding and agreement for treatment  Counseling / Coordination of Care:    Patient's progress discussed with staff in treatment team meeting  Medications, treatment progress and treatment plan reviewed with patient  Medication education provided to patient  Supportive therapy provided to patient  Coping skills reviewed with patient  Portions of the record may have been created with voice recognition software  Occasional wrong word or "sound a like" substitutions may have occurred due to the inherent limitations of voice recognition software  Read the chart carefully and recognize, using context, where substitutions have occurred  There may be translation, syntax,  or grammatical errors  If you have any questions, please contact the dictating provider

## 2017-10-17 NOTE — CASE MANAGEMENT
CM called Helga Hindser at [facility]CM to confirm she is coming in tomorrow to meet with Dustin Lawson regarding ICM referral and she confirmed she will be in to completed the intake  CM spoke to Dustin Lawson regarding discharge planning  He is agreeable to ICM referral  CM also reviewed referral to [address] and discussed benefits of program and he is agreeable  He said he takes the bus without difficulty to his appointments  CM called [facility] and left message with Damien Nicole to discuss discharge planning

## 2017-10-17 NOTE — PROGRESS NOTES
Quiet evening although in the company of peers in the sitting room  Accuchecks 171 and 210 - insulin coverage as per algorithm

## 2017-10-17 NOTE — CASE MANAGEMENT
CM spoke to Russell Muñoz at [facility] regarding the 304 to 306 conversion and discharge Thursday  CM did discuss with  oHda Funez the discharge plan of [facility] and ICM with [facility]  Hoda Funez gave outpt appointment of Friday October 20th at 1000 in which she will meet with Cullen Reno to discuss 306  (noted in AVS)  Hoda Funez requested CM fax [phone],psych evaluation and discharge instructions to her at fax [phone]  Hoda Funez said the original [phone] will be a part of patients records at [facility]

## 2017-10-17 NOTE — CASE MANAGEMENT
CM called Pennie Guajardo at [facility] and left message regarding a 304 to 306 conversion with discharge date Thursday  Phone

## 2017-10-18 LAB
GLUCOSE SERPL-MCNC: 165 MG/DL (ref 65–140)
GLUCOSE SERPL-MCNC: 188 MG/DL (ref 65–140)
GLUCOSE SERPL-MCNC: 193 MG/DL (ref 65–140)
GLUCOSE SERPL-MCNC: 232 MG/DL (ref 65–140)

## 2017-10-18 PROCEDURE — 82948 REAGENT STRIP/BLOOD GLUCOSE: CPT

## 2017-10-18 RX ADMIN — LISINOPRIL 20 MG: 20 TABLET ORAL at 08:20

## 2017-10-18 RX ADMIN — INSULIN LISPRO 2 UNITS: 100 INJECTION, SOLUTION INTRAVENOUS; SUBCUTANEOUS at 12:19

## 2017-10-18 RX ADMIN — HALOPERIDOL 2 MG: 2 TABLET ORAL at 08:20

## 2017-10-18 RX ADMIN — BENZTROPINE MESYLATE 0.5 MG: 0.5 TABLET ORAL at 08:21

## 2017-10-18 RX ADMIN — METFORMIN HYDROCHLORIDE 1000 MG: 500 TABLET, EXTENDED RELEASE ORAL at 08:19

## 2017-10-18 RX ADMIN — AMLODIPINE BESYLATE 10 MG: 10 TABLET ORAL at 08:21

## 2017-10-18 RX ADMIN — SITAGLIPTIN 50 MG: 50 TABLET, FILM COATED ORAL at 08:20

## 2017-10-18 RX ADMIN — INSULIN LISPRO 1 UNITS: 100 INJECTION, SOLUTION INTRAVENOUS; SUBCUTANEOUS at 17:32

## 2017-10-18 RX ADMIN — HALOPERIDOL 2 MG: 2 TABLET ORAL at 17:33

## 2017-10-18 RX ADMIN — BENZTROPINE MESYLATE 0.5 MG: 0.5 TABLET ORAL at 17:33

## 2017-10-18 RX ADMIN — ARIPIPRAZOLE 20 MG: 10 TABLET ORAL at 08:20

## 2017-10-18 RX ADMIN — CHLORTHALIDONE 50 MG: 25 TABLET ORAL at 08:21

## 2017-10-18 RX ADMIN — INSULIN LISPRO 1 UNITS: 100 INJECTION, SOLUTION INTRAVENOUS; SUBCUTANEOUS at 08:32

## 2017-10-18 RX ADMIN — METFORMIN HYDROCHLORIDE 1000 MG: 500 TABLET, EXTENDED RELEASE ORAL at 17:30

## 2017-10-18 RX ADMIN — NICOTINE 1 PATCH: 14 PATCH, EXTENDED RELEASE TRANSDERMAL at 08:22

## 2017-10-18 NOTE — PLAN OF CARE
Problem: PSYCHOSIS  Goal: Will report no hallucinations or delusions  Interventions:  - Administer medication as  ordered  - Every waking shifts and PRN assess for the presence of hallucinations and or delusions  - Assist with reality testing to support increasing orientation  - Assess if patient's hallucinations or delusions are encouraging self-harm or harm to others and intervene as appropriate   -Goal extended on 10/3/2017   -Goal extended on 10/17/2017   Outcome: Progressing      Problem: INVOLUNTARY ADMIT  Goal: Will cooperate with staff recommendations and doctor's orders and will demonstrate appropriate behavior  INTERVENTIONS:  - Treat underlying conditions and offer medication as ordered  - Educate regarding involuntary admission procedures and rules  - Utilize positive consistent limit setting strategies to support patient and staff safety   -Goal extended on 10/13/2017   -Goal extended on 10/17/2017   Outcome: Progressing      Problem: Ineffective Coping  Goal: Identifies ineffective coping skills  Outcome: Progressing    Goal: Identifies healthy coping skills  Goal extended on 10/02/2017   Outcome: Progressing    Goal: Participates in unit activities  Interventions:  - Provide therapeutic environment   - Provide required programming   - Redirect inappropriate behaviors      Goal extended on 10/12/2017   Outcome: Progressing      Problem: DISCHARGE PLANNING  Goal: Discharge to home or other facility with appropriate resources  INTERVENTIONS:  - Identify barriers to discharge w/patient and caregiver  - Arrange for needed discharge resources and transportation as appropriate  - Identify discharge learning needs (meds, wound care, etc )  - Arrange for interpretive services to assist at discharge as needed  - Refer to Case Management Department for coordinating discharge planning if the patient needs post-hospital services based on physician/advanced practitioner order or complex needs related to functional status, cognitive ability, or social support system   -Goal extended on 10/04/2017   -Goal extended on 10/17/2017   Outcome: Progressing

## 2017-10-18 NOTE — CASE MANAGEMENT
Magno Russell 112 ( [phone]) from [facility] is currently meeting with Dominga Yeager and completing intake for ICM services  CM met with Raji Magana and reviewed discharge plan of appointment with Ariela Naidu on Friday October 20th at 1000 and appointments at [facility] on Monday October 23rd  Andria Toro said she will  Dominga Yeager Friday at his home at 9:45 and take him to the appointment with Ariela Naidu

## 2017-10-18 NOTE — PROGRESS NOTES
Pt feels ready for d/c  Continues with fixed delusion about daughter, but it is only revealed when pt asked about this  Denies SI  Re-enforced medication compliance

## 2017-10-18 NOTE — PROGRESS NOTES
Progress Note - Behavioral Health   Trena Paris 55 y o  male MRN: @MRN   Unit/Bed#: LA1 759-01 Encounter: [id number]        The patient was seen for continuing care and reviewed with staff  Report from staff regarding this patient received and discussed, and records reviewed prior to seeing this patient   He was assessed today by ICM workers and was not paranoid or unhappy about his after discharge plan  Will convert his inpatient commitment to outpatient  Pt ageed to follow up was follow up outpatient appointments did not endorse paranoid ideations today    Behavior over the last 24 hours:  Slightly improvng    Patient remains appropriate, calm, delusional      Sleep: no change  Appetite: improving  Medication side effects: No    No TD like symptoms were noted today  ROS: no complaints    Mental Status Evaluation:  The patient was less anxious less irritable less tense, was improved mood and affect limited the thoughts process was normal rate and normal volume of speech he denies suicidal homicidal ideations and today his delusional believes were not so ended the was fully alert oriented x3  Recent remote memory was grossly intact if not related to his psychosis, language was appropriate associations were concrete  Nicolasie Matsalasck His insight remained restricted but as he is judgment is improving  Laboratory results:  I have personally reviewed all pertinent laboratory results  Progress Toward Goals: Improvement in judgment but not and insight    Assessment/Plan   Principal Problem:    Schizophrenia ([address] )  Active Problems:    Benign essential HTN    Type 2 diabetes mellitus (HCC)    HLD (hyperlipidemia)    Tobacco dependence    Atypical psychosis    Hyperglycemia    Hyponatremia      Recommended Treatment:  Will continue his Abilify 20 mg because the patient's condition is started slowly digressing after his Haldol also decreased    continue Haldol to 2  mg bid, pt no longer has transient facial motor movements, resembling TD  Continue Abilify Mantena  Planned medication and treatment changes: All current active medications have been reviewed  Continue treatment with group therapy, milieu therapy, occupational therapy and medication management  Risks / Benefits of Treatment:    Risks, benefits, and possible side effects of medications explained to patient including risk of parkinsonian symptoms, Tardive Dyskinesia and metabolic syndrome related to treatment with antipsychotic medications  The patient verbalizes understanding and agreement for treatment  Counseling / Coordination of Care:    Patient's progress discussed with staff in treatment team meeting  Medications, treatment progress and treatment plan reviewed with patient  Medication education provided to patient  Supportive therapy provided to patient  Coping skills reviewed with patient  Portions of the record may have been created with voice recognition software  Occasional wrong word or "sound a like" substitutions may have occurred due to the inherent limitations of voice recognition software  Read the chart carefully and recognize, using context, where substitutions have occurred  There may be translation, syntax,  or grammatical errors  If you have any questions, please contact the dictating provider  The

## 2017-10-18 NOTE — CASE MANAGEMENT
CM faxed [facility] request form to [facility]ita [facility]hotra for transportation home tomorrow  Per Ramiro Second the [facility] will  Gisela Bowmanman at 1300 tomorrow for discharge home

## 2017-10-18 NOTE — PROGRESS NOTES
Januvia increased to 100mgs po starting tomorrow  He is in the milieu as usual  Pleasant and appropriate with peers and staff

## 2017-10-19 VITALS
SYSTOLIC BLOOD PRESSURE: 139 MMHG | HEIGHT: 74 IN | RESPIRATION RATE: 16 BRPM | WEIGHT: 156.2 LBS | DIASTOLIC BLOOD PRESSURE: 71 MMHG | BODY MASS INDEX: 20.05 KG/M2 | TEMPERATURE: 98 F | HEART RATE: 67 BPM

## 2017-10-19 LAB
ANION GAP SERPL CALCULATED.3IONS-SCNC: 9 MMOL/L (ref 4–13)
BUN SERPL-MCNC: 24 MG/DL (ref 5–25)
CALCIUM SERPL-MCNC: 8.9 MG/DL (ref 8.3–10.1)
CHLORIDE SERPL-SCNC: 97 MMOL/L (ref 100–108)
CO2 SERPL-SCNC: 27 MMOL/L (ref 21–32)
CREAT SERPL-MCNC: 0.98 MG/DL (ref 0.6–1.3)
GFR SERPL CREATININE-BSD FRML MDRD: 86 ML/MIN/1.73SQ M
GLUCOSE P FAST SERPL-MCNC: 146 MG/DL (ref 65–99)
GLUCOSE SERPL-MCNC: 110 MG/DL (ref 65–140)
GLUCOSE SERPL-MCNC: 146 MG/DL (ref 65–140)
GLUCOSE SERPL-MCNC: 167 MG/DL (ref 65–140)
POTASSIUM SERPL-SCNC: 4.1 MMOL/L (ref 3.5–5.3)
SODIUM SERPL-SCNC: 133 MMOL/L (ref 136–145)

## 2017-10-19 PROCEDURE — 82948 REAGENT STRIP/BLOOD GLUCOSE: CPT

## 2017-10-19 PROCEDURE — 80048 BASIC METABOLIC PNL TOTAL CA: CPT | Performed by: INTERNAL MEDICINE

## 2017-10-19 RX ORDER — METFORMIN HYDROCHLORIDE EXTENDED-RELEASE TABLETS 1000 MG/1
1000 TABLET, FILM COATED, EXTENDED RELEASE ORAL 2 TIMES DAILY WITH MEALS
Qty: 60 TABLET | Refills: 0 | Status: SHIPPED | OUTPATIENT
Start: 2017-10-19 | End: 2019-03-12 | Stop reason: SDUPTHER

## 2017-10-19 RX ORDER — LISINOPRIL 20 MG/1
20 TABLET ORAL DAILY
Qty: 30 TABLET | Refills: 0 | Status: SHIPPED | OUTPATIENT
Start: 2017-10-19 | End: 2019-03-12 | Stop reason: SDUPTHER

## 2017-10-19 RX ORDER — AMLODIPINE BESYLATE 10 MG/1
10 TABLET ORAL DAILY
Qty: 30 TABLET | Refills: 0 | Status: SHIPPED | OUTPATIENT
Start: 2017-10-19 | End: 2019-03-12 | Stop reason: SDUPTHER

## 2017-10-19 RX ORDER — ARIPIPRAZOLE 20 MG/1
20 TABLET ORAL DAILY
Qty: 30 TABLET | Refills: 0 | Status: SHIPPED | OUTPATIENT
Start: 2017-10-20 | End: 2017-11-19

## 2017-10-19 RX ADMIN — LISINOPRIL 20 MG: 20 TABLET ORAL at 08:13

## 2017-10-19 RX ADMIN — METFORMIN HYDROCHLORIDE 1000 MG: 500 TABLET, EXTENDED RELEASE ORAL at 08:14

## 2017-10-19 RX ADMIN — BENZTROPINE MESYLATE 0.5 MG: 0.5 TABLET ORAL at 08:11

## 2017-10-19 RX ADMIN — CHLORTHALIDONE 50 MG: 25 TABLET ORAL at 08:15

## 2017-10-19 RX ADMIN — INSULIN LISPRO 1 UNITS: 100 INJECTION, SOLUTION INTRAVENOUS; SUBCUTANEOUS at 08:19

## 2017-10-19 RX ADMIN — AMLODIPINE BESYLATE 10 MG: 10 TABLET ORAL at 08:15

## 2017-10-19 RX ADMIN — HALOPERIDOL 2 MG: 2 TABLET ORAL at 08:14

## 2017-10-19 RX ADMIN — SITAGLIPTIN 100 MG: 100 TABLET, FILM COATED ORAL at 08:12

## 2017-10-19 RX ADMIN — ARIPIPRAZOLE 20 MG: 10 TABLET ORAL at 08:13

## 2017-10-19 RX ADMIN — NICOTINE 1 PATCH: 14 PATCH, EXTENDED RELEASE TRANSDERMAL at 08:15

## 2017-10-19 NOTE — NURSING NOTE
AVS and upcoming appointments reviewed with patient  Patient discharged with instructions, belongings, and prescriptions in the company of [address] transport

## 2017-10-19 NOTE — CASE MANAGEMENT
CM faxed psych evaluation, AVS and 306 to ESTELLA SCANLON, [facility] (Nalini Petty) and [facility]

## 2017-10-19 NOTE — PLAN OF CARE
Problem: PSYCHOSIS  Goal: Will report no hallucinations or delusions  Interventions:  - Administer medication as  ordered  - Every waking shifts and PRN assess for the presence of hallucinations and or delusions  - Assist with reality testing to support increasing orientation  - Assess if patient's hallucinations or delusions are encouraging self-harm or harm to others and intervene as appropriate   -Goal extended on 10/3/2017   -Goal extended on 10/17/2017   Outcome: Completed Date Met: 10/19/17

## 2017-10-19 NOTE — PLAN OF CARE
Problem: Ineffective Coping  Goal: Identifies ineffective coping skills  Outcome: Completed Date Met: 10/19/17    Goal: Identifies healthy coping skills  Goal extended on 10/02/2017   Outcome: Completed Date Met: 10/19/17    Goal: Participates in unit activities  Interventions:  - Provide therapeutic environment   - Provide required programming   - Redirect inappropriate behaviors      Goal extended on 10/12/2017   Outcome: Completed Date Met: 10/19/17

## 2017-10-19 NOTE — DISCHARGE INSTR - LAB
Contact Information: If you have any questions, concerns, pended studies, tests and/or procedures, or emergencies regarding your inpatient behavioral health visit  Please contact [facility] [phone] and ask to speak to a , nurse or physician  A contact is available 24 hours/ 7 days a week at this number  Summary of Procedures Performed During your Stay:  Below is a list of major procedures performed during your hospital stay and a summary of results:  {PROCEDURES:36083}    Pending Studies     Start     Ordered    10/19/17 9398  Basic metabolic panel      79/89/43 1111        If studies are pending at discharge, follow up with your PCP and/or referring provider  Contact Information: If you have any questions, concerns, pended studies, tests and/or procedures, or emergencies regarding your inpatient behavioral health visit  Please contact { [phone]} and ask to speak to a , nurse or physician  A contact is available 24 hours/ 7 days a week at this number  Summary of Procedures Performed During your Stay:  Below is a list of major procedures performed during your hospital stay and a summary of results:  - No major procedures performed  Pending Studies     Start     Ordered    10/19/17 6190  Basic metabolic panel      14/63/44 1111        If studies are pending at discharge, follow up with your PCP and/or referring provider

## 2017-10-19 NOTE — PLAN OF CARE
Problem: DISCHARGE PLANNING  Goal: Discharge to home or other facility with appropriate resources  INTERVENTIONS:  - Identify barriers to discharge w/patient and caregiver  - Arrange for needed discharge resources and transportation as appropriate  - Identify discharge learning needs (meds, wound care, etc )  - Arrange for interpretive services to assist at discharge as needed  - Refer to Case Management Department for coordinating discharge planning if the patient needs post-hospital services based on physician/advanced practitioner order or complex needs related to functional status, cognitive ability, or social support system   -Goal extended on 10/04/2017   -Goal extended on 10/17/2017   Outcome: Completed Date Met: 10/19/17

## 2017-10-19 NOTE — PROGRESS NOTES
Patient chart and glucose readings reviewed, glucose readings remain above goal despite increased metformin dosing and addition of Januvia, will increase Januvia dosing and d/c sliding scale insulin  Patient should continue Metformin 1000mg BID and Januvia 100mg daily at discharge  Do not recommend insulin at discharge due to risks of hypoglycemia, A1c <9 and concern for safety if patient unable to safely administer and monitor insulin independently at home  Advise close PCP follow-up at discharge for further diabetic management including annual Opthomology and Podiatry evaluations  Jimmy NINA    Internal Medicine PGY-3  10/19/2017 6:39 AM
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
## 2018-01-15 NOTE — PROCEDURES
Procedures by Katie Garvey PA-C  at 7/13/2017  4:31 PM      Author:  Katie Garvey PA-C Service:  Interventional Radiology  Author Type:  Physician Assistant    Filed:  7/13/2017  4:34 PM Date of Service:  7/13/2017  4:31 PM Status:  Signed    :  Katie Garvey PA-C (Physician Assistant)  Cosigner:  Effie Trevizo MD at 7/14/2017  9:39 AM      Procedure Orders:       1  Lumbar Puncture [[id number]] ordered by Katie Garvey PA-C at 07/13/17 1631                 Post-procedure Diagnoses:       1  Paranoid psychosis [F22]       2  Change in behavior [R46 89]       3  Neurosyphilis in male [A52 3]                 Lumbar Puncture  Date/Time: 7/13/2017 4:31 PM  Performed by: Darcy Hurley by: Roberta Dasilva     Patient location: [facility]  Consent:     Consent obtained:  Verbal and written    Consent given by:  Patient    Risks discussed:  Bleeding, headache, infection, nerve damage, pain and repeat procedure    Alternatives discussed:  No treatment  Universal protocol:     Procedure explained and questions answered to patient or proxy's satisfaction: yes      Relevant documents present and verified: yes      Test results available and properly labeled: yes       Imaging studies available: yes      Required blood products, implants, devices, and special equipment available: yes      Immediately prior to procedure a time out was called: yes      Site/side marked: yes      Patient identity confirmed:  Verbally with patient, arm band, provided demographic data and hospital-assigned identification number  Pre-procedure details:     Preparation: Patient was prepped and draped in usual sterile fashion    Indications:     Indications: evaluation for infection and evaluation for altered mental status    Anesthesia (see MAR for exact dosages): Anesthesia method:  Local infiltration    Local anesthetic:  Lidocaine 1% w/o epi  Procedure details:     Lumbar space: L2-L3      Equipment: Lumbar puncture kit used      Ultrasound guidance: no      Number of attempts:  1    Fluid appearance:  Clear    Tubes of fluid:  4    Total volume (ml):  16  Post-procedure:     Puncture site:  Adhesive bandage applied    Patient tolerance of procedure:   Tolerated well, no immediate complications    Patient instructed to lie flat for one(1) hour post lumbar puncture : Yes                   Received for:Provider  EPIC   Jul 14 2017  9:39AM [facility] Standard Time

## 2018-01-17 NOTE — PROCEDURES
Procedures by Lori Meadows RN at 7/21/2017  2:41 PM      Author:  Lori Meadows RN Service:  (none) Author Type:  Registered Nurse    Filed:  7/21/2017  2:43 PM Date of Service:  7/21/2017  2:41 PM Status:  Signed    :  Lori Meadows RN (Registered Nurse)        Procedure Orders:       1  Insert PICC line [[id number]] ordered by Seferino Goltz, DO at 07/21/17 1311                  Insert PICC  line  Date/Time: 7/21/2017 2:41 PM  Performed by: Gema Aparicio by: Jess Robert     Patient location:  Bedside  Other Assisting Provider:  Yes (comment) (Si Inches PCA)    Consent:     Consent obtained:  Written (Catrachita Quezada obtained consent )    Consent given by:  Patient  Universal protocol:     Procedure explained and questions answered to patient or proxy's satisfaction: yes      Relevant documents present and verified: yes      Test results available and properly labeled: yes       Imaging studies available: yes      Required blood products, implants, devices, and special equipment available: yes      Site/side marked: yes      Immediately prior to procedure, a time out was called: yes      Patient identity confirmed:  Verbally with patient and arm band  Pre-procedure details:     Hand hygiene: Hand hygiene performed prior to insertion      Sterile barrier technique: All elements of maximal sterile technique followed      Skin preparation:  ChloraPrep    Skin preparation agent: Skin preparation agent completely dried prior to procedure    Indications:     PICC line indications: long term antibiotics    Anesthesia (see MAR for exact dosages):      Anesthesia method:  Local infiltration    Local anesthetic:  Lidocaine 1% w/o epi (2 ml)  Procedure details:     Location:  Brachial    Vessel type: vein      Laterality:  Right    Approach: percutaneous technique used      Patient position:  Flat    Procedural supplies:  Double lumen    Catheter size:  5 Fr    Landmarks identified: yes      Ultrasound guidance: yes      Sterile ultrasound techniques: Sterile gel and sterile probe covers were used      Number of attempts:  1    Successful placement: yes      Vessel of catheter tip end:  Sherlock 3CG confirmed    Total catheter length (cm):  43    Catheter out on skin (cm):  0    Max flow rate:  999ml/hr     Arm circumference:  28  Post-procedure details:     Post-procedure:  Securement device placed and dressing applied    Assessment:  Blood return through all ports    Post-procedure complications: none      Patient tolerance of procedure:   Tolerated well, no immediate complications                     Received for:Provider  EPIC   Jul 21 2017  2:44PM [facility] Standard Time

## 2018-10-10 ENCOUNTER — OFFICE VISIT (OUTPATIENT)
Dept: FAMILY MEDICINE CLINIC | Facility: CLINIC | Age: 58
End: 2018-10-10
Payer: MEDICARE

## 2018-10-10 ENCOUNTER — PATIENT OUTREACH (OUTPATIENT)
Dept: FAMILY MEDICINE CLINIC | Facility: CLINIC | Age: 58
End: 2018-10-10

## 2018-10-10 VITALS
HEIGHT: 74 IN | BODY MASS INDEX: 20.53 KG/M2 | WEIGHT: 160 LBS | OXYGEN SATURATION: 99 % | RESPIRATION RATE: 18 BRPM | DIASTOLIC BLOOD PRESSURE: 84 MMHG | TEMPERATURE: 96.8 F | HEART RATE: 82 BPM | SYSTOLIC BLOOD PRESSURE: 182 MMHG

## 2018-10-10 DIAGNOSIS — B18.2 CHRONIC HEPATITIS C WITHOUT HEPATIC COMA (HCC): Primary | ICD-10-CM

## 2018-10-10 DIAGNOSIS — I10 BENIGN ESSENTIAL HTN: Chronic | ICD-10-CM

## 2018-10-10 DIAGNOSIS — E11.65 TYPE 2 DIABETES MELLITUS WITH HYPERGLYCEMIA, WITHOUT LONG-TERM CURRENT USE OF INSULIN (HCC): Chronic | ICD-10-CM

## 2018-10-10 DIAGNOSIS — F20.9 SCHIZOPHRENIA, UNSPECIFIED TYPE (HCC): ICD-10-CM

## 2018-10-10 DIAGNOSIS — F20.9 SCHIZOPHRENIA, UNSPECIFIED TYPE (HCC): Primary | ICD-10-CM

## 2018-10-10 PROBLEM — F17.200 TOBACCO DEPENDENCE: Chronic | Status: RESOLVED | Noted: 2017-07-20 | Resolved: 2018-10-10

## 2018-10-10 PROCEDURE — 99213 OFFICE O/P EST LOW 20 MIN: CPT | Performed by: FAMILY MEDICINE

## 2018-10-10 NOTE — ASSESSMENT & PLAN NOTE
Lab Results   Component Value Date    HGBA1C 7 7 (H) 10/13/2017       No results for input(s): POCGLU in the last 72 hours  Blood Sugar Average: Last 72 hrs:   Will send him for blood work with a follow-up  Patient is unsure of taking any medication for diabetes he does not know the med name of any of his medications including psychiatric medicine  He however things that he is on 1 pill for his hypertension  He will get his blood work done and follow up with the  Patient has already received his flu vaccine last month from local pharmacy

## 2018-10-10 NOTE — PROGRESS NOTES
Assessment/Plan:    No problem-specific Assessment & Plan notes found for this encounter  Diagnoses and all orders for this visit:    Chronic hepatitis C without hepatic coma ([facility] [address] )  -     Ambulatory referral to Gastroenterology; Future    Type 2 diabetes mellitus with hyperglycemia, without long-term current use of insulin (HCC)  -     CBC and differential; Future  -     Comprehensive metabolic panel; Future  -     Lipid panel; Future  -     Hemoglobin A1C; Future  -     Microalbumin / creatinine urine ratio    Benign essential HTN  -     CBC and differential; Future  -     Comprehensive metabolic panel; Future  -     Lipid panel; Future  -     Hemoglobin A1C; Future  -     Microalbumin / creatinine urine ratio    Schizophrenia, unspecified type (HCC)          Subjective:      Patient ID: Kym Martinez is a 65 y o  male  Patient is followed up by psychiatry for his schizophrenia  Medication  Has not been seen in a year and he does not know his medications  Has a history og diabetes and HTN  HPI    As above    Review of Systems   Constitutional: Negative for chills, fatigue and fever  HENT: Negative for hearing loss  Respiratory: Negative for chest tightness and shortness of breath  Cardiovascular: Negative for chest pain, palpitations and leg swelling  Gastrointestinal: Negative for abdominal pain  Endocrine: Negative for polyuria  Musculoskeletal: Negative for arthralgias  Skin: Negative for color change  Neurological: Negative for dizziness  Psychiatric/Behavioral: Negative for agitation  Objective:      BP (!) 182/84 (BP Location: Right arm, Patient Position: Sitting, Cuff Size: Standard) Comment: did not take HTN meds  Pulse 82   Temp (!) 96 8 °F (36 °C) (Temporal)   Resp 18   Ht 6' 2" (1 88 m)   Wt 72 6 kg (160 lb)   SpO2 99%   BMI 20 54 kg/m²          Physical Exam   Constitutional: He is oriented to person, place, and time  He appears well-developed     HENT:   Head: Normocephalic  Eyes: Pupils are equal, round, and reactive to light  Conjunctivae are normal    Neck: Normal range of motion  Neck supple  Cardiovascular: Normal rate, regular rhythm and normal heart sounds  Pulmonary/Chest: Effort normal and breath sounds normal    Abdominal: Soft  Bowel sounds are normal    Musculoskeletal: He exhibits no edema or tenderness  Neurological: He is alert and oriented to person, place, and time  Skin: Skin is warm and dry  Psychiatric: He has a normal mood and affect

## 2018-10-10 NOTE — ASSESSMENT & PLAN NOTE
His blood pressure is elevated today at 182/84 he has not taken his meds today as per the patient  Patient has a history of schizophrenia with hallucinations  He is unclear as to what medication he takes  He will bring me back his full bottle it today or tomorrow for medication reconciliation  Patient expressed understanding of the same  Will arrange for Sofie Ambrocio  to speak with them for any additional support that he may need

## 2018-10-16 NOTE — PROGRESS NOTES
MICHELLE RUIZ consulted by Dr Lowanda Hodgkin to meet with pt  Pt reports to his PCP visit without any of his meds, is a poor historian, and it is unclear if pt is compliant with his Tx plan  Pt is a pleasant man, who denies any psychiatric Hx  Documentation in Epic states that pt had several previous inpatient psych hospitalizations, and a Hx of schizophrenia and neurosyphilis  Pt denies any previous psych hospitalizations  Pt denies SI/HI during this encounter  Pt states that he goes to work every day and comes home  Pt denies that he takes any psych meds  Documentation in [address] [place] [address] Rd states that as of 9/2017, pt has a  with [facility], and is followed by psychiatry at [facility]  At this point, pt states he needs to leave to go to work or he will be late  Pt is a very poor historian, and is not denying he is involved with any community services for [address], or that he is currently taking any psych meds  MICHELLE RUIZ encouraged pt to bring all of his meds to his next visit  MICHELLE RUIZ also asked pt if it would be okay for MICHELLE RUIZ to call pt in two weeks to check in on him and further discuss his Tx plan  Pt states he is agreeable to this  MICHELLE RUIZ will continue to f/u

## 2018-10-31 ENCOUNTER — PATIENT OUTREACH (OUTPATIENT)
Dept: FAMILY MEDICINE CLINIC | Facility: CLINIC | Age: 58
End: 2018-10-31

## 2018-10-31 NOTE — PROGRESS NOTES
MICHELLE RUIZ attempted to f/u with pt via telephone  MICHELLE RUIZ was able to leave a voicemail asking pt to return call  Pt has a long [address] Hx and did not bring his meds to last visit with PCP  MICHELLE RUIZ to continue to follow and attempt to meet with pt at his next PCP appt on 12/13/18

## 2018-12-13 ENCOUNTER — PATIENT OUTREACH (OUTPATIENT)
Dept: FAMILY MEDICINE CLINIC | Facility: CLINIC | Age: 58
End: 2018-12-13

## 2018-12-17 ENCOUNTER — APPOINTMENT (EMERGENCY)
Dept: RADIOLOGY | Facility: HOSPITAL | Age: 58
End: 2018-12-17
Payer: MEDICARE

## 2018-12-17 ENCOUNTER — HOSPITAL ENCOUNTER (EMERGENCY)
Facility: HOSPITAL | Age: 58
Discharge: HOME | End: 2018-12-17
Attending: EMERGENCY MEDICINE
Payer: MEDICARE

## 2018-12-17 VITALS
TEMPERATURE: 97.5 F | OXYGEN SATURATION: 98 % | DIASTOLIC BLOOD PRESSURE: 97 MMHG | HEART RATE: 55 BPM | RESPIRATION RATE: 18 BRPM | BODY MASS INDEX: 20.29 KG/M2 | WEIGHT: 158.06 LBS | SYSTOLIC BLOOD PRESSURE: 180 MMHG

## 2018-12-17 DIAGNOSIS — R00.2 PALPITATIONS: ICD-10-CM

## 2018-12-17 DIAGNOSIS — R73.9 HYPERGLYCEMIA: Primary | ICD-10-CM

## 2018-12-17 LAB
ALBUMIN SERPL BCP-MCNC: 3.8 G/DL (ref 3–5.2)
ALP SERPL-CCNC: 63 U/L (ref 43–122)
ALT SERPL W P-5'-P-CCNC: 52 U/L (ref 9–52)
ANION GAP SERPL CALCULATED.3IONS-SCNC: 9 MMOL/L (ref 5–14)
AST SERPL W P-5'-P-CCNC: 38 U/L (ref 17–59)
BASOPHILS # BLD AUTO: 0.2 THOUSANDS/ΜL (ref 0–0.1)
BASOPHILS NFR BLD AUTO: 2 % (ref 0–1)
BILIRUB SERPL-MCNC: 0.5 MG/DL
BUN SERPL-MCNC: 11 MG/DL (ref 5–25)
CALCIUM SERPL-MCNC: 8.8 MG/DL (ref 8.4–10.2)
CHLORIDE SERPL-SCNC: 104 MMOL/L (ref 97–108)
CO2 SERPL-SCNC: 27 MMOL/L (ref 22–30)
CREAT SERPL-MCNC: 0.75 MG/DL (ref 0.7–1.5)
EOSINOPHIL # BLD AUTO: 0.1 THOUSAND/ΜL (ref 0–0.4)
EOSINOPHIL NFR BLD AUTO: 2 % (ref 0–6)
ERYTHROCYTE [DISTWIDTH] IN BLOOD BY AUTOMATED COUNT: 13.5 %
GFR SERPL CREATININE-BSD FRML MDRD: 102 ML/MIN/1.73SQ M
GLUCOSE SERPL-MCNC: 144 MG/DL (ref 70–99)
HCT VFR BLD AUTO: 42.9 % (ref 41–53)
HGB BLD-MCNC: 13.9 G/DL (ref 13.5–17.5)
LIPASE SERPL-CCNC: 128 U/L (ref 23–300)
LYMPHOCYTES # BLD AUTO: 3.1 THOUSANDS/ΜL (ref 0.5–4)
LYMPHOCYTES NFR BLD AUTO: 47 % (ref 25–45)
MCH RBC QN AUTO: 29.6 PG (ref 26–34)
MCHC RBC AUTO-ENTMCNC: 32.4 G/DL (ref 31–36)
MCV RBC AUTO: 91 FL (ref 80–100)
MONOCYTES # BLD AUTO: 0.5 THOUSAND/ΜL (ref 0.2–0.9)
MONOCYTES NFR BLD AUTO: 7 % (ref 1–10)
NEUTROPHILS # BLD AUTO: 2.8 THOUSANDS/ΜL (ref 1.8–7.8)
NEUTS SEG NFR BLD AUTO: 42 % (ref 45–65)
PLATELET # BLD AUTO: 217 THOUSANDS/UL (ref 150–450)
PMV BLD AUTO: 9.6 FL (ref 8.9–12.7)
POTASSIUM SERPL-SCNC: 3.5 MMOL/L (ref 3.6–5)
PROT SERPL-MCNC: 7 G/DL (ref 5.9–8.4)
RBC # BLD AUTO: 4.69 MILLION/UL (ref 4.5–5.9)
SODIUM SERPL-SCNC: 140 MMOL/L (ref 137–147)
TROPONIN I SERPL-MCNC: 0.02 NG/ML (ref 0–0.03)
WBC # BLD AUTO: 6.7 THOUSAND/UL (ref 4.5–11)

## 2018-12-17 PROCEDURE — 80053 COMPREHEN METABOLIC PANEL: CPT | Performed by: PHYSICIAN ASSISTANT

## 2018-12-17 PROCEDURE — 93005 ELECTROCARDIOGRAM TRACING: CPT

## 2018-12-17 PROCEDURE — 71045 X-RAY EXAM CHEST 1 VIEW: CPT

## 2018-12-17 PROCEDURE — 36415 COLL VENOUS BLD VENIPUNCTURE: CPT | Performed by: PHYSICIAN ASSISTANT

## 2018-12-17 PROCEDURE — 83690 ASSAY OF LIPASE: CPT | Performed by: PHYSICIAN ASSISTANT

## 2018-12-17 PROCEDURE — 99285 EMERGENCY DEPT VISIT HI MDM: CPT

## 2018-12-17 PROCEDURE — 84484 ASSAY OF TROPONIN QUANT: CPT | Performed by: PHYSICIAN ASSISTANT

## 2018-12-17 PROCEDURE — 85025 COMPLETE CBC W/AUTO DIFF WBC: CPT | Performed by: PHYSICIAN ASSISTANT

## 2018-12-17 RX ORDER — SODIUM CHLORIDE 9 MG/ML
250 INJECTION, SOLUTION INTRAVENOUS CONTINUOUS
Status: DISCONTINUED | OUTPATIENT
Start: 2018-12-17 | End: 2018-12-17 | Stop reason: HOSPADM

## 2018-12-17 RX ADMIN — SODIUM CHLORIDE 250 ML/HR: 9 INJECTION, SOLUTION INTRAVENOUS at 18:51
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
## 2018-12-18 LAB
ATRIAL RATE: 55 BPM
ATRIAL RATE: 60 BPM
ATRIAL RATE: 61 BPM
P AXIS: 42 DEGREES
P AXIS: 46 DEGREES
P AXIS: 49 DEGREES
PR INTERVAL: 204 MS
PR INTERVAL: 204 MS
PR INTERVAL: 216 MS
QRS AXIS: 34 DEGREES
QRS AXIS: 35 DEGREES
QRS AXIS: 38 DEGREES
QRSD INTERVAL: 104 MS
QT INTERVAL: 434 MS
QT INTERVAL: 442 MS
QT INTERVAL: 446 MS
QTC INTERVAL: 426 MS
QTC INTERVAL: 436 MS
QTC INTERVAL: 442 MS
T WAVE AXIS: 126 DEGREES
T WAVE AXIS: 131 DEGREES
T WAVE AXIS: 143 DEGREES
VENTRICULAR RATE: 55 BPM
VENTRICULAR RATE: 60 BPM
VENTRICULAR RATE: 61 BPM

## 2018-12-18 PROCEDURE — 93010 ELECTROCARDIOGRAM REPORT: CPT | Performed by: INTERNAL MEDICINE

## 2018-12-18 NOTE — DISCHARGE INSTRUCTIONS
Heart Palpitations   WHAT YOU NEED TO KNOW:   Heart palpitations are feelings that your heart races, jumps, throbs, or flutters  You may feel extra beats, no beats for a short time, or skipped beats  You may have these feelings in your chest, throat, or neck  They may happen when you are sitting, standing, or lying  Heart palpitations may be frightening, but are usually not caused by a serious problem  DISCHARGE INSTRUCTIONS:   Call 911 or have someone else call for any of the following:   · You have any of the following signs of a heart attack:      ¨ Squeezing, pressure, or pain in your chest that lasts longer than 5 minutes or returns    ¨ Discomfort or pain in your back, neck, jaw, stomach, or arm     ¨ Trouble breathing    ¨ Nausea or vomiting    ¨ Lightheadedness or a sudden cold sweat, especially with chest pain or trouble breathing    · You have any of the following signs of a stroke:      ¨ Numbness or drooping on one side of your face     ¨ Weakness in an arm or leg    ¨ Confusion or difficulty speaking    ¨ Dizziness, a severe headache, or vision loss    · You faint or lose consciousness  Return to the emergency department if:   · Your palpitations happen more often or get more intense  Contact your healthcare provider if:   · You have new or worsening swelling in your feet or ankles  · You have questions or concerns about your condition or care  Follow up with your healthcare provider as directed: You may need to follow up with a cardiologist  Tiffanie Donohue may need tests to check for heart problems that cause palpitations  Write down your questions so you remember to ask them during your visits  Keep a record:  Write down when your palpitations start and stop, what you were doing when they started, and your symptoms  Keep track of what you ate or drank within a few hours of your palpitations  Include anything that seemed to help your symptoms, such as lying down or holding your breath   This record will help you and your healthcare provider learn what triggers your palpitations  Bring this record with you to your follow up visits  Help prevent heart palpitations:   · Manage stress and anxiety  Find ways to relax such as listening to music, meditating, or doing yoga  Exercise can also help decrease stress and anxiety  Talk to someone you trust about your stress or anxiety  You can also talk to a therapist      · Get plenty of sleep every night  Ask your healthcare provider how much sleep you need each night  · Do not drink caffeine or alcohol  Caffeine and alcohol can make your palpitations worse  Caffeine is found in soda, coffee, tea, chocolate, and drinks that increase your energy  · Do not smoke  Nicotine and other chemicals in cigarettes and cigars may damage your heart and blood vessels  Ask your healthcare provider for information if you currently smoke and need help to quit  E-cigarettes or smokeless tobacco still contain nicotine  Talk to your healthcare provider before you use these products  · Do not use illegal drugs  Talk to your healthcare provider if you use illegal drugs and want help to quit  © 2017 [address] [facility] Information is for End User's use only and may not be sold, redistributed or otherwise used for commercial purposes  All illustrations and images included in CareNotes® are the copyrighted property of A D A M , Inc  or Byron Diego  The above information is an  only  It is not intended as medical advice for individual conditions or treatments  Talk to your doctor, nurse or pharmacist before following any medical regimen to see if it is safe and effective for you  Hyperglycemia, Non-Diabetic   WHAT YOU NEED TO KNOW:   Hyperglycemia is a blood glucose (sugar) level that is higher than normal  Hyperglycemia can be short-term, or it can become a long-term condition that leads to diabetes     DISCHARGE INSTRUCTIONS:   Medicines: You may  need any of the following:  · Hypoglycemic medicine  helps to decrease the amount of sugar in your blood  This medicine helps your body move the sugar to your cells, where it is needed for energy  Your healthcare provider will tell you how often to take this medicine and how long to take it  · Insulin  helps to decrease blood sugar levels  You may need 1 or more shots of insulin each day  You or a family member will be taught how to give the insulin shots  Your healthcare provider will tell you how often you need to inject insulin each day  He will also tell you how long you will need to take it  · Take your medicine as directed  Contact your healthcare provider if you think your medicine is not helping or if you have side effects  Tell him or her if you are allergic to any medicine  Keep a list of the medicines, vitamins, and herbs you take  Include the amounts, and when and why you take them  Bring the list or the pill bottles to follow-up visits  Carry your medicine list with you in case of an emergency  Follow up with your healthcare provider as directed: You may need to return for more tests  Your healthcare provider may also need to refer you to a specialist, such as a dietitian  Write down your questions so you remember to ask them during your visits  Manage hyperglycemia:  The following may help keep your blood sugar at the level recommended by your healthcare provider:  · Get regular exercise  This can help to lower your blood sugar levels  It can also improve your heart health and help you stay at a healthy weight  Get at least 30 minutes of exercise 5 days each week  Ask your healthcare provider about the best exercise plan for you  · Lose weight if you are overweight  Even a small loss of 5% to 10% of your body weight can help to decrease your blood sugar levels  It can also improve your heart health  · Eat healthy foods    Include foods that are high in fiber, such as vegetables, fruits, whole grains, and beans  Also include foods that are low in fat, such as low-fat dairy (milk, yogurt, and cheese), fish, and lean meat  Limit foods that are high in calories and sugar, such as sweet desserts, potato chips, and candy  Limit foods that are high in sodium, such as table salt and salty foods  Your healthcare provider may suggest that you limit carbohydrates to lower your blood sugar levels  · Do not smoke  If you smoke, it is never too late to quit  Smoking can worsen the problems that can occur with hyperglycemia  Ask your healthcare provider for information if you need help quitting  · Limit alcohol  Women should limit alcohol to 1 drink a day  Men should limit alcohol to 2 drinks a day  A drink of alcohol is 12 ounces of beer, 5 ounces of wine, or 1½ ounces of liquor  How to check your blood sugar level: You may need to check your blood sugar level with a blood glucose meter  If you take insulin, you may need to check your blood sugar level at least 3 times each day  Ask your healthcare provider when and how often to check during the day  Ask what your blood sugar levels should be before and after you eat  You may need to check for ketones in your urine if your blood sugar level is high  Write down your results and show them to your healthcare provider  Contact your healthcare provider if:   · You have a fever  · Your blood sugar levels continue to be higher than you were told they should be  · You continue to urinate more often than usual      · You continue to be more thirsty than usual      · You continue to have nausea and vomiting  · You have a wound that has signs of infection, such as redness, swelling, and pus  · You have questions or concerns about your condition or care  Seek care immediately or call 911 if:   · Your arm or leg feels warm, tender, and painful  It may look swollen and red      · You feel lightheaded, short of breath, and have chest pain  · You cough up blood  © 2017 [address] [employer] Information is for End User's use only and may not be sold, redistributed or otherwise used for commercial purposes  All illustrations and images included in CareNotes® are the copyrighted property of A D A M , Inc  or Byron Cortez  The above information is an  only  It is not intended as medical advice for individual conditions or treatments  Talk to your doctor, nurse or pharmacist before following any medical regimen to see if it is safe and effective for you

## 2018-12-28 NOTE — PROGRESS NOTES
MICHELLE has attempted to reach pt for f/u twice  Pt now has no active phone number listed in his demographics  SW called previous phone number listed, but it was not in service at this time  MICHELLE has no means of contacting pt at this time  MICHELLE is removing pt from active caseload at this time  MICHELLE is available via order for ambulatory social work care management for additional support as needed for pt

## 2019-03-12 ENCOUNTER — OFFICE VISIT (OUTPATIENT)
Dept: FAMILY MEDICINE CLINIC | Facility: CLINIC | Age: 59
End: 2019-03-12

## 2019-03-12 ENCOUNTER — TELEPHONE (OUTPATIENT)
Dept: FAMILY MEDICINE CLINIC | Facility: CLINIC | Age: 59
End: 2019-03-12

## 2019-03-12 VITALS
TEMPERATURE: 97.2 F | SYSTOLIC BLOOD PRESSURE: 162 MMHG | DIASTOLIC BLOOD PRESSURE: 82 MMHG | WEIGHT: 158 LBS | BODY MASS INDEX: 20.29 KG/M2 | RESPIRATION RATE: 16 BRPM | OXYGEN SATURATION: 98 % | HEART RATE: 66 BPM

## 2019-03-12 DIAGNOSIS — I10 BENIGN ESSENTIAL HTN: Chronic | ICD-10-CM

## 2019-03-12 DIAGNOSIS — E11.8 TYPE 2 DIABETES MELLITUS WITH COMPLICATION, WITH LONG-TERM CURRENT USE OF INSULIN (HCC): Primary | Chronic | ICD-10-CM

## 2019-03-12 DIAGNOSIS — Z79.4 TYPE 2 DIABETES MELLITUS WITH COMPLICATION, WITH LONG-TERM CURRENT USE OF INSULIN (HCC): Primary | Chronic | ICD-10-CM

## 2019-03-12 DIAGNOSIS — Z72.0 TOBACCO USE: ICD-10-CM

## 2019-03-12 DIAGNOSIS — F20.9 SCHIZOPHRENIA, UNSPECIFIED TYPE (HCC): ICD-10-CM

## 2019-03-12 LAB — SL AMB POCT GLUCOSE BLD: 114

## 2019-03-12 PROCEDURE — 99213 OFFICE O/P EST LOW 20 MIN: CPT | Performed by: PHYSICIAN ASSISTANT

## 2019-03-12 PROCEDURE — 82948 REAGENT STRIP/BLOOD GLUCOSE: CPT | Performed by: PHYSICIAN ASSISTANT

## 2019-03-12 RX ORDER — AMLODIPINE BESYLATE 10 MG/1
10 TABLET ORAL DAILY
Qty: 30 TABLET | Refills: 2 | Status: SHIPPED | OUTPATIENT
Start: 2019-03-12 | End: 2019-06-10 | Stop reason: SDUPTHER

## 2019-03-12 RX ORDER — LISINOPRIL 20 MG/1
20 TABLET ORAL DAILY
Qty: 30 TABLET | Refills: 3 | Status: SHIPPED | OUTPATIENT
Start: 2019-03-12 | End: 2019-07-10 | Stop reason: SDUPTHER

## 2019-03-12 RX ORDER — METFORMIN HYDROCHLORIDE EXTENDED-RELEASE TABLETS 1000 MG/1
1000 TABLET, FILM COATED, EXTENDED RELEASE ORAL 2 TIMES DAILY WITH MEALS
Qty: 60 TABLET | Refills: 2 | Status: SHIPPED | OUTPATIENT
Start: 2019-03-12 | End: 2019-06-28 | Stop reason: SDUPTHER

## 2019-03-12 NOTE — ASSESSMENT & PLAN NOTE
Lab Results   Component Value Date    HGBA1C 7 7 (H) 10/13/2017     - Patient is currently checking sugars every morning when he wakes up before he eats anything  Blood sugars are ranging from 100-120  Continue current regimen    - Continue taking metformin as prescribed  Patient states he is currently not taking the [place] [facility] [place]  Will check hgba1c (patient has ordered labs from October 2018 that he has yet to complete, encouraged patient to go and get these done)     - POCT glucose today: 114

## 2019-03-12 NOTE — ASSESSMENT & PLAN NOTE
- Patient has been without his medications for about 3 weeks now  Patient reports compliance with amlodipine and lisinopril  Continue current regimen

## 2019-03-12 NOTE — PROGRESS NOTES
Subjective:     Patient ID: Ramya Beard  is a 63 y o  male with known PMH of HTN, schizophrenia, and T2DM who presents today in office for medication refills  - Patient ran out of medications 3 weeks ago  He is checking his sugars every morning when he wakes up before he eats anything  Blood sugars are ranging from 100-120  Patient is still smoking, about 6-7 cigarettes per day  Patient states he is slowly cutting down on the amount of cigarettes he smokes each day  Patient has no complaints today  - Patient follows with psychiatry for his psych medications  The following portions of the patient's history were reviewed and updated as appropriate: allergies, current medications, past family history, past medical history, past social history, past surgical history and problem list         Review of Systems   Constitutional: Negative for appetite change, fatigue, fever and unexpected weight change  HENT: Negative for congestion, ear pain, postnasal drip, rhinorrhea and sore throat  Eyes: Negative for pain and visual disturbance  Respiratory: Negative for cough, chest tightness and shortness of breath  Cardiovascular: Negative for chest pain, palpitations and leg swelling  Gastrointestinal: Negative for abdominal pain, constipation, diarrhea, nausea and vomiting  Genitourinary: Negative for difficulty urinating and dysuria  Musculoskeletal: Negative for arthralgias and back pain  Skin: Negative for rash  Neurological: Negative for dizziness, numbness and headaches  Psychiatric/Behavioral: Negative for behavioral problems, hallucinations and suicidal ideas  The patient is not nervous/anxious  Objective:  Vitals:    03/12/19 1433   BP: 162/82   Pulse: 66   Resp: 16   Temp: (!) 97 2 °F (36 2 °C)   SpO2: 98%        Physical Exam   Constitutional: He is oriented to person, place, and time  He appears well-developed and well-nourished     HENT:   Head: Normocephalic and atraumatic  Right Ear: Tympanic membrane and external ear normal    Left Ear: Tympanic membrane and external ear normal    Nose: Nose normal    Mouth/Throat: Uvula is midline, oropharynx is clear and moist and mucous membranes are normal    Eyes: Pupils are equal, round, and reactive to light  Conjunctivae and EOM are normal    Neck: Normal range of motion  Neck supple  Cardiovascular: Normal rate, regular rhythm and intact distal pulses  No murmur heard  Pulmonary/Chest: Effort normal and breath sounds normal  He has no wheezes  Abdominal: Soft  Bowel sounds are normal  There is no tenderness  Musculoskeletal: Normal range of motion  Neurological: He is alert and oriented to person, place, and time  Skin: Skin is warm and dry  Capillary refill takes less than 2 seconds  No rash noted  Psychiatric: He has a normal mood and affect  His behavior is normal          Assessment/Plan:    Type 2 diabetes mellitus ([email or web address] 75 )  Lab Results   Component Value Date    HGBA1C 7 7 (H) 10/13/2017     - Patient is currently checking sugars every morning when he wakes up before he eats anything  Blood sugars are ranging from 100-120  Continue current regimen    - Continue taking metformin as prescribed  Patient states he is currently not taking the [facility] [place]  Will check hgba1c (patient has ordered labs from October 2018 that he has yet to complete, encouraged patient to go and get these done)  - POCT glucose today: 114         Benign essential HTN  - Patient has been without his medications for about 3 weeks now  Patient reports compliance with amlodipine and lisinopril  Continue current regimen  Tobacco use  - Encouraged patient to continue his plan of smoking cessation  Schizophrenia ([facility] [[email or web address] 75 )  - Continue follow up with psychiatry          Diagnoses and all orders for this visit:    Type 2 diabetes mellitus with complication, with long-term current use of insulin ([facility] )  -     POCT blood glucose  -     metFORMIN (FORTAMET) 1000 MG (OSM) 24 hr tablet; Take 1 tablet (1,000 mg total) by mouth 2 (two) times a day with meals for 90 days    Benign essential HTN  -     amLODIPine (NORVASC) 10 mg tablet; Take 1 tablet (10 mg total) by mouth daily for 90 days  -     lisinopril (ZESTRIL) 20 mg tablet; Take 1 tablet (20 mg total) by mouth daily for 120 days    Schizophrenia, unspecified type ([facility] )    Tobacco use      All of the patient's questions were answered  Patient understands and agrees with the above plan  Return in about 3 months (around 6/12/2019) for Next scheduled follow up of chronic conditions with PCP, Dr Timo Arce PA-C  03/12/19

## 2019-06-10 ENCOUNTER — TELEPHONE (OUTPATIENT)
Dept: FAMILY MEDICINE CLINIC | Facility: CLINIC | Age: 59
End: 2019-06-10

## 2019-06-10 DIAGNOSIS — I10 BENIGN ESSENTIAL HTN: Chronic | ICD-10-CM

## 2019-06-11 DIAGNOSIS — I10 BENIGN ESSENTIAL HTN: Chronic | ICD-10-CM

## 2019-06-11 RX ORDER — AMLODIPINE BESYLATE 10 MG/1
10 TABLET ORAL DAILY
Qty: 30 TABLET | Refills: 2 | Status: SHIPPED | OUTPATIENT
Start: 2019-06-11 | End: 2019-06-11 | Stop reason: SDUPTHER

## 2019-06-11 RX ORDER — AMLODIPINE BESYLATE 10 MG/1
10 TABLET ORAL DAILY
Qty: 30 TABLET | Refills: 2 | Status: SHIPPED | OUTPATIENT
Start: 2019-06-11 | End: 2019-07-10 | Stop reason: SDUPTHER

## 2019-06-28 DIAGNOSIS — E11.8 TYPE 2 DIABETES MELLITUS WITH COMPLICATION, WITH LONG-TERM CURRENT USE OF INSULIN (HCC): Chronic | ICD-10-CM

## 2019-06-28 DIAGNOSIS — Z79.4 TYPE 2 DIABETES MELLITUS WITH COMPLICATION, WITH LONG-TERM CURRENT USE OF INSULIN (HCC): Chronic | ICD-10-CM

## 2019-07-01 RX ORDER — METFORMIN HYDROCHLORIDE 500 MG/1
1000 TABLET, EXTENDED RELEASE ORAL 2 TIMES DAILY WITH MEALS
Qty: 120 TABLET | Refills: 0 | Status: SHIPPED | OUTPATIENT
Start: 2019-07-01 | End: 2019-11-15 | Stop reason: SDUPTHER

## 2019-07-10 DIAGNOSIS — I10 BENIGN ESSENTIAL HTN: Chronic | ICD-10-CM

## 2019-07-10 RX ORDER — LISINOPRIL 20 MG/1
20 TABLET ORAL DAILY
Qty: 30 TABLET | Refills: 0 | Status: SHIPPED | OUTPATIENT
Start: 2019-07-10 | End: 2019-12-13 | Stop reason: SDUPTHER

## 2019-07-10 RX ORDER — AMLODIPINE BESYLATE 10 MG/1
10 TABLET ORAL DAILY
Qty: 30 TABLET | Refills: 0 | Status: SHIPPED | OUTPATIENT
Start: 2019-07-10 | End: 2019-12-13 | Stop reason: SDUPTHER

## 2019-07-10 NOTE — TELEPHONE ENCOUNTER
Awesome, thanks so much! I had left a msg at the pharmacy for pt to come over here after picking up his meds

## 2019-07-10 NOTE — TELEPHONE ENCOUNTER
Pt had an appt today with Dr Derk Dakin that was missed and there is no phone number on file to contact pt  Did you by any chance confirm a phone number for pt when he requested refills?

## 2019-07-10 NOTE — TELEPHONE ENCOUNTER
Patient walked in asking for refills on the following medications said pharmacy stated no refills available     amLODIPine (NORVASC) 10 mg tablet    lisinopril (ZESTRIL) 20 mg tablet

## 2019-07-10 NOTE — TELEPHONE ENCOUNTER
Will provide 30 day supply of medication  Will not refill next time if patient does not show for his appt scheduled for 7/26/19

## 2019-07-10 NOTE — TELEPHONE ENCOUNTER
I spoke with Charlie / Kely Dom[facility] pharmacy staff and advised her I needed to talk to pt regarding his refills but he has no phone number so I asked her if there was any way they can relay a msg to pt when he comes to  meds  She will be putting a notation to staff to advise pt pcp office needs to speak to him and would like him to stop by after picking up meds  Not sure if this will work but if it does and pt comes to the office, please let him know that no more refills will be sent unless he is seen on 7/26/19  Thanks so much

## 2019-07-10 NOTE — TELEPHONE ENCOUNTER
Pt came back in and he does not have no phone number to reach him  Needs medication refills  Pt will be walking back the pharmacy later on    Has a pending appt 7/26 with Efraín David

## 2019-07-10 NOTE — TELEPHONE ENCOUNTER
Last time pt was seen for his chronic conditions was 10/10/18  Pt was seen in March 2019 for medication refill and was to f/u in 3 months with PCP and no showed  Pt had kassi scheduled today, did not come to appt but did walk in to the office to request refills  Pt was scheduled for an appt with  Monica on 7/26/19  Not sure if pt will make that appt either  Pt has a history of no shows

## 2019-07-16 ENCOUNTER — TELEPHONE (OUTPATIENT)
Dept: FAMILY MEDICINE CLINIC | Facility: CLINIC | Age: 59
End: 2019-07-16

## 2019-07-24 NOTE — TELEPHONE ENCOUNTER
The form that patient has provided needs to be completed by patient's psychiatrist  Keshia Cai patient returns, please let him know to give the form to his psychiatrist to be filled out  Will place form in the "Ready for patient " bin  Thanks!

## 2019-11-14 DIAGNOSIS — E11.8 TYPE 2 DIABETES MELLITUS WITH COMPLICATION, WITH LONG-TERM CURRENT USE OF INSULIN (HCC): Chronic | ICD-10-CM

## 2019-11-14 DIAGNOSIS — Z79.4 TYPE 2 DIABETES MELLITUS WITH COMPLICATION, WITH LONG-TERM CURRENT USE OF INSULIN (HCC): Chronic | ICD-10-CM

## 2019-11-15 DIAGNOSIS — E11.8 TYPE 2 DIABETES MELLITUS WITH COMPLICATION, WITH LONG-TERM CURRENT USE OF INSULIN (HCC): Chronic | ICD-10-CM

## 2019-11-15 DIAGNOSIS — Z79.4 TYPE 2 DIABETES MELLITUS WITH COMPLICATION, WITH LONG-TERM CURRENT USE OF INSULIN (HCC): Chronic | ICD-10-CM

## 2019-11-16 RX ORDER — METFORMIN HYDROCHLORIDE 500 MG/1
1000 TABLET, EXTENDED RELEASE ORAL 2 TIMES DAILY WITH MEALS
Qty: 120 TABLET | Refills: 0 | Status: SHIPPED | OUTPATIENT
Start: 2019-11-16 | End: 2020-01-21

## 2019-11-16 RX ORDER — METFORMIN HYDROCHLORIDE 500 MG/1
TABLET, EXTENDED RELEASE ORAL
Qty: 120 TABLET | Refills: 0 | OUTPATIENT
Start: 2019-11-16

## 2019-12-13 DIAGNOSIS — I10 BENIGN ESSENTIAL HTN: Chronic | ICD-10-CM

## 2019-12-13 RX ORDER — LISINOPRIL 20 MG/1
TABLET ORAL
Qty: 30 TABLET | Refills: 0 | Status: SHIPPED | OUTPATIENT
Start: 2019-12-13 | End: 2020-01-21

## 2019-12-13 RX ORDER — AMLODIPINE BESYLATE 10 MG/1
TABLET ORAL
Qty: 30 TABLET | Refills: 0 | Status: SHIPPED | OUTPATIENT
Start: 2019-12-13 | End: 2020-01-21

## 2019-12-13 NOTE — TELEPHONE ENCOUNTER
Patient needs follow up appointment for hypertension  He was last seen in March 2019 and has no showed several appointments since then  Thanks!

## 2020-01-14 ENCOUNTER — TELEPHONE (OUTPATIENT)
Dept: FAMILY MEDICINE CLINIC | Facility: CLINIC | Age: 60
End: 2020-01-14

## 2020-01-14 NOTE — TELEPHONE ENCOUNTER
Pt walked in stating he need the following meds refilled:     AmLODIPine (NORVASC) 10 mg tablet     Lisinopril (ZESTRIL) 20 mg tablet     MetFORMIN (GLUCOPHAGE-XR) 500 mg 24 hr tablet

## 2020-01-14 NOTE — TELEPHONE ENCOUNTER
Attempted to try and call pt, but there is no phone number on file nor emergency number on his file

## 2020-01-14 NOTE — TELEPHONE ENCOUNTER
Pt will need an prior to send the request  Can you please attempt to contact pt for an appt last seen 03/2019 thank you

## 2020-01-21 DIAGNOSIS — E11.8 TYPE 2 DIABETES MELLITUS WITH COMPLICATION, WITH LONG-TERM CURRENT USE OF INSULIN (HCC): Chronic | ICD-10-CM

## 2020-01-21 DIAGNOSIS — I10 BENIGN ESSENTIAL HTN: Chronic | ICD-10-CM

## 2020-01-21 DIAGNOSIS — Z79.4 TYPE 2 DIABETES MELLITUS WITH COMPLICATION, WITH LONG-TERM CURRENT USE OF INSULIN (HCC): Chronic | ICD-10-CM

## 2020-01-21 RX ORDER — METFORMIN HYDROCHLORIDE 500 MG/1
TABLET, EXTENDED RELEASE ORAL
Qty: 120 TABLET | Refills: 0 | Status: SHIPPED | OUTPATIENT
Start: 2020-01-21 | End: 2020-02-27

## 2020-01-21 RX ORDER — LISINOPRIL 20 MG/1
TABLET ORAL
Qty: 30 TABLET | Refills: 0 | Status: SHIPPED | OUTPATIENT
Start: 2020-01-21 | End: 2020-02-27

## 2020-01-21 RX ORDER — AMLODIPINE BESYLATE 10 MG/1
TABLET ORAL
Qty: 30 TABLET | Refills: 0 | Status: SHIPPED | OUTPATIENT
Start: 2020-01-21 | End: 2020-02-27

## 2020-02-07 DIAGNOSIS — Z78.9 NEED FOR FOLLOW-UP BY SOCIAL WORKER: Primary | ICD-10-CM

## 2020-02-14 ENCOUNTER — PATIENT OUTREACH (OUTPATIENT)
Dept: FAMILY MEDICINE CLINIC | Facility: CLINIC | Age: 60
End: 2020-02-14

## 2020-02-14 NOTE — PROGRESS NOTES
MICHELLE RUIZ referral from [address] [place] [address] Staff for two consecutive no shows  MICHELLE RUIZ attempted outreach, but there is no phone number for pt or an emergency contact on chart  MICHELLE RUIZ sent an unable to reach letter to pt asking him to contact office  MICHELLE RUIZ is closing referral at this time, but remains available for additional support as needed via order

## 2020-02-14 NOTE — LETTER
[address]  [phone]    Re: Care Coordination   2/14/2020       Dear Zach Clark,    We tried to reach you  It is important that you contact the [place] [address] as soon as possible at: Dept: [phone] and schedule an appointment with your doctor       Sincerely,         VIRI Fernandez, [place]   Care Manager  [phone]

## 2020-02-25 DIAGNOSIS — E11.8 TYPE 2 DIABETES MELLITUS WITH COMPLICATION, WITH LONG-TERM CURRENT USE OF INSULIN (HCC): Chronic | ICD-10-CM

## 2020-02-25 DIAGNOSIS — I10 BENIGN ESSENTIAL HTN: Chronic | ICD-10-CM

## 2020-02-25 DIAGNOSIS — Z79.4 TYPE 2 DIABETES MELLITUS WITH COMPLICATION, WITH LONG-TERM CURRENT USE OF INSULIN (HCC): Chronic | ICD-10-CM

## 2020-02-27 RX ORDER — METFORMIN HYDROCHLORIDE 500 MG/1
TABLET, EXTENDED RELEASE ORAL
Qty: 120 TABLET | Refills: 0 | Status: SHIPPED | OUTPATIENT
Start: 2020-02-27 | End: 2020-04-03 | Stop reason: SDUPTHER

## 2020-02-27 RX ORDER — LISINOPRIL 20 MG/1
TABLET ORAL
Qty: 30 TABLET | Refills: 0 | Status: SHIPPED | OUTPATIENT
Start: 2020-02-27 | End: 2020-04-03 | Stop reason: SDUPTHER

## 2020-02-27 RX ORDER — AMLODIPINE BESYLATE 10 MG/1
TABLET ORAL
Qty: 30 TABLET | Refills: 0 | Status: SHIPPED | OUTPATIENT
Start: 2020-02-27 | End: 2020-04-03 | Stop reason: SDUPTHER

## 2020-02-27 NOTE — TELEPHONE ENCOUNTER
Will refill medications for 30 days, however patient has not been seen since March 2019  Patient needs a follow up appointment  Thanks!

## 2020-02-28 NOTE — TELEPHONE ENCOUNTER
Patient has no contact number listed, letter sent to residence to contact office to schedule appointment

## 2020-03-27 DIAGNOSIS — I10 BENIGN ESSENTIAL HTN: Chronic | ICD-10-CM

## 2020-03-27 DIAGNOSIS — E11.8 TYPE 2 DIABETES MELLITUS WITH COMPLICATION, WITH LONG-TERM CURRENT USE OF INSULIN (HCC): Chronic | ICD-10-CM

## 2020-03-27 DIAGNOSIS — Z79.4 TYPE 2 DIABETES MELLITUS WITH COMPLICATION, WITH LONG-TERM CURRENT USE OF INSULIN (HCC): Chronic | ICD-10-CM

## 2020-03-30 RX ORDER — LISINOPRIL 20 MG/1
TABLET ORAL
Qty: 30 TABLET | Refills: 4 | OUTPATIENT
Start: 2020-03-30

## 2020-03-30 RX ORDER — METFORMIN HYDROCHLORIDE 500 MG/1
TABLET, EXTENDED RELEASE ORAL
Qty: 120 TABLET | Refills: 4 | OUTPATIENT
Start: 2020-03-30

## 2020-03-30 RX ORDER — AMLODIPINE BESYLATE 10 MG/1
TABLET ORAL
Qty: 30 TABLET | Refills: 4 | OUTPATIENT
Start: 2020-03-30

## 2020-03-31 DIAGNOSIS — I10 BENIGN ESSENTIAL HTN: Chronic | ICD-10-CM

## 2020-03-31 DIAGNOSIS — E11.8 TYPE 2 DIABETES MELLITUS WITH COMPLICATION, WITH LONG-TERM CURRENT USE OF INSULIN (HCC): Chronic | ICD-10-CM

## 2020-03-31 DIAGNOSIS — Z79.4 TYPE 2 DIABETES MELLITUS WITH COMPLICATION, WITH LONG-TERM CURRENT USE OF INSULIN (HCC): Chronic | ICD-10-CM

## 2020-03-31 RX ORDER — AMLODIPINE BESYLATE 10 MG/1
10 TABLET ORAL DAILY
Qty: 30 TABLET | Refills: 0 | Status: CANCELLED | OUTPATIENT
Start: 2020-03-31

## 2020-03-31 RX ORDER — METFORMIN HYDROCHLORIDE 500 MG/1
TABLET, EXTENDED RELEASE ORAL
Qty: 120 TABLET | Refills: 0 | Status: CANCELLED | OUTPATIENT
Start: 2020-03-31

## 2020-03-31 RX ORDER — LISINOPRIL 20 MG/1
20 TABLET ORAL DAILY
Qty: 30 TABLET | Refills: 0 | Status: CANCELLED | OUTPATIENT
Start: 2020-03-31

## 2020-04-03 DIAGNOSIS — I10 BENIGN ESSENTIAL HTN: Chronic | ICD-10-CM

## 2020-04-03 DIAGNOSIS — E11.8 TYPE 2 DIABETES MELLITUS WITH COMPLICATION, WITH LONG-TERM CURRENT USE OF INSULIN (HCC): Chronic | ICD-10-CM

## 2020-04-03 DIAGNOSIS — Z79.4 TYPE 2 DIABETES MELLITUS WITH COMPLICATION, WITH LONG-TERM CURRENT USE OF INSULIN (HCC): Chronic | ICD-10-CM

## 2020-04-03 RX ORDER — AMLODIPINE BESYLATE 10 MG/1
10 TABLET ORAL DAILY
Qty: 30 TABLET | Refills: 0 | Status: SHIPPED | OUTPATIENT
Start: 2020-04-03 | End: 2020-04-08 | Stop reason: SDUPTHER

## 2020-04-03 RX ORDER — LISINOPRIL 20 MG/1
20 TABLET ORAL DAILY
Qty: 30 TABLET | Refills: 0 | Status: SHIPPED | OUTPATIENT
Start: 2020-04-03 | End: 2020-04-08 | Stop reason: SDUPTHER

## 2020-04-03 RX ORDER — METFORMIN HYDROCHLORIDE 500 MG/1
500 TABLET, EXTENDED RELEASE ORAL 2 TIMES DAILY WITH MEALS
Qty: 120 TABLET | Refills: 0 | Status: SHIPPED | OUTPATIENT
Start: 2020-04-03 | End: 2020-04-08 | Stop reason: SDUPTHER

## 2020-04-08 DIAGNOSIS — Z79.4 TYPE 2 DIABETES MELLITUS WITH COMPLICATION, WITH LONG-TERM CURRENT USE OF INSULIN (HCC): Chronic | ICD-10-CM

## 2020-04-08 DIAGNOSIS — I10 BENIGN ESSENTIAL HTN: Chronic | ICD-10-CM

## 2020-04-08 DIAGNOSIS — E11.8 TYPE 2 DIABETES MELLITUS WITH COMPLICATION, WITH LONG-TERM CURRENT USE OF INSULIN (HCC): Chronic | ICD-10-CM

## 2020-04-08 RX ORDER — METFORMIN HYDROCHLORIDE 500 MG/1
500 TABLET, EXTENDED RELEASE ORAL 2 TIMES DAILY WITH MEALS
Qty: 120 TABLET | Refills: 0 | Status: SHIPPED | OUTPATIENT
Start: 2020-04-08 | End: 2020-05-14 | Stop reason: SDUPTHER

## 2020-04-08 RX ORDER — LISINOPRIL 20 MG/1
20 TABLET ORAL DAILY
Qty: 30 TABLET | Refills: 0 | Status: SHIPPED | OUTPATIENT
Start: 2020-04-08 | End: 2020-05-14 | Stop reason: SDUPTHER

## 2020-04-08 RX ORDER — AMLODIPINE BESYLATE 10 MG/1
10 TABLET ORAL DAILY
Qty: 30 TABLET | Refills: 0 | Status: SHIPPED | OUTPATIENT
Start: 2020-04-08 | End: 2020-05-14 | Stop reason: SDUPTHER

## 2020-05-14 ENCOUNTER — OFFICE VISIT (OUTPATIENT)
Dept: FAMILY MEDICINE CLINIC | Facility: CLINIC | Age: 60
End: 2020-05-14

## 2020-05-14 ENCOUNTER — APPOINTMENT (OUTPATIENT)
Dept: LAB | Facility: HOSPITAL | Age: 60
End: 2020-05-14
Payer: MEDICARE

## 2020-05-14 ENCOUNTER — TELEPHONE (OUTPATIENT)
Dept: FAMILY MEDICINE CLINIC | Facility: CLINIC | Age: 60
End: 2020-05-14

## 2020-05-14 VITALS
HEART RATE: 89 BPM | RESPIRATION RATE: 20 BRPM | HEIGHT: 74 IN | TEMPERATURE: 97.3 F | BODY MASS INDEX: 20.2 KG/M2 | DIASTOLIC BLOOD PRESSURE: 100 MMHG | WEIGHT: 157.4 LBS | OXYGEN SATURATION: 99 % | SYSTOLIC BLOOD PRESSURE: 180 MMHG

## 2020-05-14 DIAGNOSIS — Z91.19 NONCOMPLIANCE: ICD-10-CM

## 2020-05-14 DIAGNOSIS — I10 BENIGN ESSENTIAL HTN: Chronic | ICD-10-CM

## 2020-05-14 DIAGNOSIS — E11.69 TYPE 2 DIABETES MELLITUS WITH OTHER SPECIFIED COMPLICATION, UNSPECIFIED WHETHER LONG TERM INSULIN USE (HCC): Primary | ICD-10-CM

## 2020-05-14 DIAGNOSIS — Z79.4 TYPE 2 DIABETES MELLITUS WITH COMPLICATION, WITH LONG-TERM CURRENT USE OF INSULIN (HCC): ICD-10-CM

## 2020-05-14 DIAGNOSIS — E11.8 TYPE 2 DIABETES MELLITUS WITH COMPLICATION, WITH LONG-TERM CURRENT USE OF INSULIN (HCC): ICD-10-CM

## 2020-05-14 PROBLEM — E11.65 TYPE 2 DIABETES MELLITUS WITH HYPERGLYCEMIA, WITHOUT LONG-TERM CURRENT USE OF INSULIN (HCC): Status: ACTIVE | Noted: 2017-07-20

## 2020-05-14 LAB
ALBUMIN SERPL BCP-MCNC: 4.4 G/DL (ref 3–5.2)
ALP SERPL-CCNC: 101 U/L (ref 43–122)
ALT SERPL W P-5'-P-CCNC: 91 U/L (ref 9–52)
ANION GAP SERPL CALCULATED.3IONS-SCNC: 9 MMOL/L (ref 5–14)
AST SERPL W P-5'-P-CCNC: 71 U/L (ref 17–59)
BASOPHILS # BLD AUTO: 0.1 THOUSANDS/ΜL (ref 0–0.1)
BASOPHILS NFR BLD AUTO: 1 % (ref 0–1)
BILIRUB SERPL-MCNC: 0.7 MG/DL
BUN SERPL-MCNC: 10 MG/DL (ref 5–25)
CALCIUM SERPL-MCNC: 9.2 MG/DL (ref 8.4–10.2)
CHLORIDE SERPL-SCNC: 104 MMOL/L (ref 97–108)
CHOLEST SERPL-MCNC: 149 MG/DL
CO2 SERPL-SCNC: 27 MMOL/L (ref 22–30)
CREAT SERPL-MCNC: 0.84 MG/DL (ref 0.7–1.5)
CREAT UR-MCNC: 295 MG/DL
EOSINOPHIL # BLD AUTO: 0 THOUSAND/ΜL (ref 0–0.4)
EOSINOPHIL NFR BLD AUTO: 0 % (ref 0–6)
ERYTHROCYTE [DISTWIDTH] IN BLOOD BY AUTOMATED COUNT: 13.3 %
GFR SERPL CREATININE-BSD FRML MDRD: 112 ML/MIN/1.73SQ M
GLUCOSE P FAST SERPL-MCNC: 119 MG/DL (ref 70–99)
HCT VFR BLD AUTO: 46.1 % (ref 41–53)
HDLC SERPL-MCNC: 45 MG/DL
HGB BLD-MCNC: 15.4 G/DL (ref 13.5–17.5)
LDLC SERPL CALC-MCNC: 83 MG/DL
LYMPHOCYTES # BLD AUTO: 3.9 THOUSANDS/ΜL (ref 0.5–4)
LYMPHOCYTES NFR BLD AUTO: 30 % (ref 25–45)
MCH RBC QN AUTO: 32 PG (ref 26–34)
MCHC RBC AUTO-ENTMCNC: 33.5 G/DL (ref 31–36)
MCV RBC AUTO: 95 FL (ref 80–100)
MICROALBUMIN UR-MCNC: 99.4 MG/L (ref 0–20)
MICROALBUMIN/CREAT 24H UR: 34 MG/G CREATININE (ref 0–30)
MONOCYTES # BLD AUTO: 0.9 THOUSAND/ΜL (ref 0.2–0.9)
MONOCYTES NFR BLD AUTO: 7 % (ref 1–10)
NEUTROPHILS # BLD AUTO: 8.2 THOUSANDS/ΜL (ref 1.8–7.8)
NEUTS SEG NFR BLD AUTO: 62 % (ref 45–65)
NONHDLC SERPL-MCNC: 104 MG/DL
PLATELET # BLD AUTO: 295 THOUSANDS/UL (ref 150–450)
PMV BLD AUTO: 9.3 FL (ref 8.9–12.7)
POTASSIUM SERPL-SCNC: 4.1 MMOL/L (ref 3.6–5)
PROT SERPL-MCNC: 8.3 G/DL (ref 5.9–8.4)
RBC # BLD AUTO: 4.83 MILLION/UL (ref 4.5–5.9)
SL AMB POCT HEMOGLOBIN AIC: 6.5 (ref ?–6.5)
SODIUM SERPL-SCNC: 140 MMOL/L (ref 137–147)
TRIGL SERPL-MCNC: 105 MG/DL
TSH SERPL DL<=0.05 MIU/L-ACNC: 1.04 UIU/ML (ref 0.47–4.68)
WBC # BLD AUTO: 13.1 THOUSAND/UL (ref 4.5–11)

## 2020-05-14 PROCEDURE — 80053 COMPREHEN METABOLIC PANEL: CPT

## 2020-05-14 PROCEDURE — 3080F DIAST BP >= 90 MM HG: CPT | Performed by: INTERNAL MEDICINE

## 2020-05-14 PROCEDURE — 80061 LIPID PANEL: CPT

## 2020-05-14 PROCEDURE — 99213 OFFICE O/P EST LOW 20 MIN: CPT | Performed by: INTERNAL MEDICINE

## 2020-05-14 PROCEDURE — 82043 UR ALBUMIN QUANTITATIVE: CPT | Performed by: FAMILY MEDICINE

## 2020-05-14 PROCEDURE — 3008F BODY MASS INDEX DOCD: CPT | Performed by: INTERNAL MEDICINE

## 2020-05-14 PROCEDURE — 85025 COMPLETE CBC W/AUTO DIFF WBC: CPT

## 2020-05-14 PROCEDURE — 84443 ASSAY THYROID STIM HORMONE: CPT

## 2020-05-14 PROCEDURE — 3044F HG A1C LEVEL LT 7.0%: CPT | Performed by: INTERNAL MEDICINE

## 2020-05-14 PROCEDURE — 82570 ASSAY OF URINE CREATININE: CPT | Performed by: FAMILY MEDICINE

## 2020-05-14 PROCEDURE — 4010F ACE/ARB THERAPY RXD/TAKEN: CPT | Performed by: INTERNAL MEDICINE

## 2020-05-14 PROCEDURE — 3077F SYST BP >= 140 MM HG: CPT | Performed by: INTERNAL MEDICINE

## 2020-05-14 PROCEDURE — 36415 COLL VENOUS BLD VENIPUNCTURE: CPT

## 2020-05-14 PROCEDURE — 83036 HEMOGLOBIN GLYCOSYLATED A1C: CPT | Performed by: INTERNAL MEDICINE

## 2020-05-14 RX ORDER — LISINOPRIL 20 MG/1
20 TABLET ORAL DAILY
Qty: 30 TABLET | Refills: 2 | Status: SHIPPED | OUTPATIENT
Start: 2020-05-14 | End: 2020-08-14

## 2020-05-14 RX ORDER — METFORMIN HYDROCHLORIDE 500 MG/1
500 TABLET, EXTENDED RELEASE ORAL 2 TIMES DAILY WITH MEALS
Qty: 120 TABLET | Refills: 1 | Status: SHIPPED | OUTPATIENT
Start: 2020-05-14 | End: 2020-08-14

## 2020-05-14 RX ORDER — AMLODIPINE BESYLATE 10 MG/1
10 TABLET ORAL DAILY
Qty: 30 TABLET | Refills: 2 | Status: SHIPPED | OUTPATIENT
Start: 2020-05-14 | End: 2020-08-14

## 2020-05-14 RX ORDER — ATORVASTATIN CALCIUM 40 MG/1
40 TABLET, FILM COATED ORAL DAILY
Qty: 30 TABLET | Refills: 2 | Status: SHIPPED | OUTPATIENT
Start: 2020-05-14 | End: 2020-08-14

## 2020-05-19 DIAGNOSIS — Z71.89 COMPLEX CARE COORDINATION: Primary | ICD-10-CM

## 2020-05-21 ENCOUNTER — PATIENT OUTREACH (OUTPATIENT)
Dept: FAMILY MEDICINE CLINIC | Facility: CLINIC | Age: 60
End: 2020-05-21

## 2020-05-21 ENCOUNTER — CLINICAL SUPPORT (OUTPATIENT)
Dept: FAMILY MEDICINE CLINIC | Facility: CLINIC | Age: 60
End: 2020-05-21

## 2020-05-21 VITALS — SYSTOLIC BLOOD PRESSURE: 140 MMHG | DIASTOLIC BLOOD PRESSURE: 84 MMHG | TEMPERATURE: 98.1 F

## 2020-05-21 DIAGNOSIS — I10 HYPERTENSION, UNSPECIFIED TYPE: Primary | ICD-10-CM

## 2020-05-28 ENCOUNTER — PATIENT OUTREACH (OUTPATIENT)
Dept: FAMILY MEDICINE CLINIC | Facility: CLINIC | Age: 60
End: 2020-05-28

## 2020-06-09 ENCOUNTER — CLINICAL SUPPORT (OUTPATIENT)
Dept: FAMILY MEDICINE CLINIC | Facility: CLINIC | Age: 60
End: 2020-06-09

## 2020-06-09 DIAGNOSIS — E11.9 DIABETIC EYE EXAM (HCC): Primary | ICD-10-CM

## 2020-06-09 DIAGNOSIS — Z01.00 DIABETIC EYE EXAM (HCC): Primary | ICD-10-CM

## 2020-06-09 PROCEDURE — 92250 FUNDUS PHOTOGRAPHY W/I&R: CPT | Performed by: FAMILY MEDICINE

## 2020-06-17 ENCOUNTER — OFFICE VISIT (OUTPATIENT)
Dept: FAMILY MEDICINE CLINIC | Facility: CLINIC | Age: 60
End: 2020-06-17

## 2020-06-17 VITALS
WEIGHT: 155 LBS | HEIGHT: 74 IN | HEART RATE: 72 BPM | DIASTOLIC BLOOD PRESSURE: 80 MMHG | OXYGEN SATURATION: 98 % | BODY MASS INDEX: 19.89 KG/M2 | SYSTOLIC BLOOD PRESSURE: 134 MMHG | TEMPERATURE: 97.5 F | RESPIRATION RATE: 18 BRPM

## 2020-06-17 DIAGNOSIS — E11.65 TYPE 2 DIABETES MELLITUS WITH HYPERGLYCEMIA, WITHOUT LONG-TERM CURRENT USE OF INSULIN (HCC): ICD-10-CM

## 2020-06-17 DIAGNOSIS — Z12.11 ENCOUNTER FOR SCREENING COLONOSCOPY: Primary | ICD-10-CM

## 2020-06-17 DIAGNOSIS — I10 BENIGN ESSENTIAL HTN: Chronic | ICD-10-CM

## 2020-06-17 DIAGNOSIS — F20.9 SCHIZOPHRENIA, UNSPECIFIED TYPE (HCC): ICD-10-CM

## 2020-06-17 DIAGNOSIS — B18.2 CHRONIC HEPATITIS C WITHOUT HEPATIC COMA (HCC): ICD-10-CM

## 2020-06-17 DIAGNOSIS — F29 ATYPICAL PSYCHOSIS (HCC): Chronic | ICD-10-CM

## 2020-06-17 PROCEDURE — 99213 OFFICE O/P EST LOW 20 MIN: CPT | Performed by: FAMILY MEDICINE

## 2020-06-17 PROCEDURE — 3044F HG A1C LEVEL LT 7.0%: CPT | Performed by: FAMILY MEDICINE

## 2020-06-17 PROCEDURE — 3008F BODY MASS INDEX DOCD: CPT | Performed by: FAMILY MEDICINE

## 2020-06-17 PROCEDURE — 3075F SYST BP GE 130 - 139MM HG: CPT | Performed by: FAMILY MEDICINE

## 2020-06-17 PROCEDURE — 3079F DIAST BP 80-89 MM HG: CPT | Performed by: FAMILY MEDICINE

## 2020-06-17 PROCEDURE — 3060F POS MICROALBUMINURIA REV: CPT | Performed by: FAMILY MEDICINE

## 2020-07-13 ENCOUNTER — HOSPITAL ENCOUNTER (EMERGENCY)
Facility: HOSPITAL | Age: 60
Discharge: HOME | End: 2020-07-13
Attending: EMERGENCY MEDICINE | Admitting: EMERGENCY MEDICINE
Payer: MEDICARE

## 2020-07-13 VITALS
SYSTOLIC BLOOD PRESSURE: 173 MMHG | HEART RATE: 78 BPM | RESPIRATION RATE: 18 BRPM | DIASTOLIC BLOOD PRESSURE: 78 MMHG | OXYGEN SATURATION: 98 % | TEMPERATURE: 97.3 F

## 2020-07-13 DIAGNOSIS — Z00.8 ENCOUNTER FOR PSYCHOLOGICAL EVALUATION: Primary | ICD-10-CM

## 2020-07-13 PROCEDURE — 99281 EMR DPT VST MAYX REQ PHY/QHP: CPT | Performed by: EMERGENCY MEDICINE

## 2020-07-13 PROCEDURE — 99284 EMERGENCY DEPT VISIT MOD MDM: CPT
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
## 2020-07-13 NOTE — ED NOTES
Changed patient into clothing  Pt said "No" and began to take clothes out of belongings bag and started to take off clothing        Lizeth Bones  07/13/20 0758
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
## 2020-08-13 DIAGNOSIS — E11.69 TYPE 2 DIABETES MELLITUS WITH OTHER SPECIFIED COMPLICATION, UNSPECIFIED WHETHER LONG TERM INSULIN USE (HCC): ICD-10-CM

## 2020-08-13 DIAGNOSIS — I10 BENIGN ESSENTIAL HTN: Chronic | ICD-10-CM

## 2020-08-14 RX ORDER — LISINOPRIL 20 MG/1
TABLET ORAL
Qty: 30 TABLET | Refills: 0 | Status: SHIPPED | OUTPATIENT
Start: 2020-08-14 | End: 2020-09-15

## 2020-08-14 RX ORDER — METFORMIN HYDROCHLORIDE 500 MG/1
TABLET, EXTENDED RELEASE ORAL
Qty: 120 TABLET | Refills: 0 | Status: SHIPPED | OUTPATIENT
Start: 2020-08-14 | End: 2020-10-19

## 2020-08-14 RX ORDER — AMLODIPINE BESYLATE 10 MG/1
TABLET ORAL
Qty: 30 TABLET | Refills: 0 | Status: SHIPPED | OUTPATIENT
Start: 2020-08-14 | End: 2020-09-15

## 2020-08-14 RX ORDER — ATORVASTATIN CALCIUM 40 MG/1
TABLET, FILM COATED ORAL
Qty: 30 TABLET | Refills: 0 | Status: SHIPPED | OUTPATIENT
Start: 2020-08-14 | End: 2020-09-15

## 2020-08-27 ENCOUNTER — TELEPHONE (OUTPATIENT)
Dept: FAMILY MEDICINE CLINIC | Facility: CLINIC | Age: 60
End: 2020-08-27

## 2020-09-15 DIAGNOSIS — I10 BENIGN ESSENTIAL HTN: Chronic | ICD-10-CM

## 2020-09-15 DIAGNOSIS — E11.69 TYPE 2 DIABETES MELLITUS WITH OTHER SPECIFIED COMPLICATION, UNSPECIFIED WHETHER LONG TERM INSULIN USE (HCC): ICD-10-CM

## 2020-09-15 RX ORDER — LISINOPRIL 20 MG/1
TABLET ORAL
Qty: 30 TABLET | Refills: 0 | Status: SHIPPED | OUTPATIENT
Start: 2020-09-15 | End: 2020-10-19

## 2020-09-15 RX ORDER — ATORVASTATIN CALCIUM 40 MG/1
TABLET, FILM COATED ORAL
Qty: 30 TABLET | Refills: 0 | Status: SHIPPED | OUTPATIENT
Start: 2020-09-15 | End: 2020-10-19

## 2020-09-15 RX ORDER — AMLODIPINE BESYLATE 10 MG/1
TABLET ORAL
Qty: 30 TABLET | Refills: 0 | Status: SHIPPED | OUTPATIENT
Start: 2020-09-15 | End: 2020-10-19

## 2020-10-19 DIAGNOSIS — E11.69 TYPE 2 DIABETES MELLITUS WITH OTHER SPECIFIED COMPLICATION, UNSPECIFIED WHETHER LONG TERM INSULIN USE (HCC): ICD-10-CM

## 2020-10-19 DIAGNOSIS — I10 BENIGN ESSENTIAL HTN: Chronic | ICD-10-CM

## 2020-10-19 RX ORDER — LISINOPRIL 20 MG/1
TABLET ORAL
Qty: 90 TABLET | Refills: 0 | Status: SHIPPED | OUTPATIENT
Start: 2020-10-19 | End: 2021-01-19 | Stop reason: SDUPTHER

## 2020-10-19 RX ORDER — AMLODIPINE BESYLATE 10 MG/1
TABLET ORAL
Qty: 90 TABLET | Refills: 0 | Status: SHIPPED | OUTPATIENT
Start: 2020-10-19 | End: 2021-01-19 | Stop reason: SDUPTHER

## 2020-10-19 RX ORDER — ATORVASTATIN CALCIUM 40 MG/1
TABLET, FILM COATED ORAL
Qty: 90 TABLET | Refills: 0 | Status: SHIPPED | OUTPATIENT
Start: 2020-10-19 | End: 2021-01-19 | Stop reason: SDUPTHER

## 2020-10-19 RX ORDER — METFORMIN HYDROCHLORIDE 500 MG/1
TABLET, EXTENDED RELEASE ORAL
Qty: 180 TABLET | Refills: 0 | Status: SHIPPED | OUTPATIENT
Start: 2020-10-19 | End: 2021-01-19 | Stop reason: SDUPTHER

## 2020-10-19 RX ORDER — METFORMIN HYDROCHLORIDE 500 MG/1
TABLET, EXTENDED RELEASE ORAL
Qty: 120 TABLET | Refills: 0 | OUTPATIENT
Start: 2020-10-19

## 2021-01-19 DIAGNOSIS — I10 BENIGN ESSENTIAL HTN: Chronic | ICD-10-CM

## 2021-01-19 DIAGNOSIS — E11.69 TYPE 2 DIABETES MELLITUS WITH OTHER SPECIFIED COMPLICATION, UNSPECIFIED WHETHER LONG TERM INSULIN USE (HCC): ICD-10-CM

## 2021-01-19 RX ORDER — ATORVASTATIN CALCIUM 40 MG/1
40 TABLET, FILM COATED ORAL DAILY
Qty: 90 TABLET | Refills: 1 | Status: SHIPPED | OUTPATIENT
Start: 2021-01-19 | End: 2021-05-07 | Stop reason: SDUPTHER

## 2021-01-19 RX ORDER — LISINOPRIL 20 MG/1
20 TABLET ORAL DAILY
Qty: 90 TABLET | Refills: 1 | Status: SHIPPED | OUTPATIENT
Start: 2021-01-19 | End: 2021-05-07 | Stop reason: SDUPTHER

## 2021-01-19 RX ORDER — METFORMIN HYDROCHLORIDE 500 MG/1
500 TABLET, EXTENDED RELEASE ORAL 2 TIMES DAILY WITH MEALS
Qty: 180 TABLET | Refills: 1 | Status: SHIPPED | OUTPATIENT
Start: 2021-01-19 | End: 2021-05-07 | Stop reason: SDUPTHER

## 2021-01-19 RX ORDER — AMLODIPINE BESYLATE 10 MG/1
10 TABLET ORAL DAILY
Qty: 90 TABLET | Refills: 1 | Status: SHIPPED | OUTPATIENT
Start: 2021-01-19 | End: 2021-05-07 | Stop reason: SDUPTHER

## 2021-01-19 NOTE — TELEPHONE ENCOUNTER
PT CAME IN AND STATED HE NEEDS REFILLS ON FOLLOWING MEDS   HAS APPT ON 1/21              amLODIPine (NORVASC) 10 mg tablet     atorvastatin (LIPITOR) 40 mg tablet     lisinopril (ZESTRIL) 20 mg tablet     metFORMIN (GLUCOPHAGE-XR) 500 mg 24 hr tablet

## 2021-03-31 ENCOUNTER — OFFICE VISIT (OUTPATIENT)
Dept: FAMILY MEDICINE CLINIC | Facility: CLINIC | Age: 61
End: 2021-03-31

## 2021-03-31 ENCOUNTER — APPOINTMENT (OUTPATIENT)
Dept: LAB | Facility: CLINIC | Age: 61
End: 2021-03-31
Payer: MEDICARE

## 2021-03-31 VITALS
HEIGHT: 74 IN | WEIGHT: 173 LBS | BODY MASS INDEX: 22.2 KG/M2 | SYSTOLIC BLOOD PRESSURE: 132 MMHG | RESPIRATION RATE: 16 BRPM | OXYGEN SATURATION: 96 % | HEART RATE: 84 BPM | TEMPERATURE: 97.7 F | DIASTOLIC BLOOD PRESSURE: 86 MMHG

## 2021-03-31 DIAGNOSIS — E11.9 DIABETIC EYE EXAM (HCC): ICD-10-CM

## 2021-03-31 DIAGNOSIS — E11.9 ENCOUNTER FOR DIABETIC FOOT EXAM (HCC): ICD-10-CM

## 2021-03-31 DIAGNOSIS — Z23 ENCOUNTER FOR IMMUNIZATION: ICD-10-CM

## 2021-03-31 DIAGNOSIS — E11.69 TYPE 2 DIABETES MELLITUS WITH OTHER SPECIFIED COMPLICATION, UNSPECIFIED WHETHER LONG TERM INSULIN USE (HCC): ICD-10-CM

## 2021-03-31 DIAGNOSIS — E11.9 TYPE 2 DIABETES MELLITUS (HCC): Chronic | ICD-10-CM

## 2021-03-31 DIAGNOSIS — Z12.5 SCREENING PSA (PROSTATE SPECIFIC ANTIGEN): ICD-10-CM

## 2021-03-31 DIAGNOSIS — E11.65 TYPE 2 DIABETES MELLITUS WITH HYPERGLYCEMIA, WITHOUT LONG-TERM CURRENT USE OF INSULIN (HCC): ICD-10-CM

## 2021-03-31 DIAGNOSIS — Z72.0 TOBACCO USE: ICD-10-CM

## 2021-03-31 DIAGNOSIS — Z00.01 ENCOUNTER FOR GENERAL ADULT MEDICAL EXAMINATION WITH ABNORMAL FINDINGS: Primary | ICD-10-CM

## 2021-03-31 DIAGNOSIS — Z12.5 SCREENING FOR PROSTATE CANCER: ICD-10-CM

## 2021-03-31 DIAGNOSIS — Z01.00 DIABETIC EYE EXAM (HCC): ICD-10-CM

## 2021-03-31 DIAGNOSIS — F20.9 SCHIZOPHRENIA, UNSPECIFIED TYPE (HCC): ICD-10-CM

## 2021-03-31 DIAGNOSIS — R41.89 COGNITIVE DEFICITS: ICD-10-CM

## 2021-03-31 PROBLEM — B18.2 CHRONIC HEPATITIS C WITHOUT HEPATIC COMA (HCC): Status: ACTIVE | Noted: 2017-07-25

## 2021-03-31 LAB
ALBUMIN SERPL BCP-MCNC: 3.7 G/DL (ref 3.5–5)
ALP SERPL-CCNC: 77 U/L (ref 46–116)
ALT SERPL W P-5'-P-CCNC: 145 U/L (ref 12–78)
ANION GAP SERPL CALCULATED.3IONS-SCNC: 7 MMOL/L (ref 4–13)
AST SERPL W P-5'-P-CCNC: 91 U/L (ref 5–45)
BILIRUB SERPL-MCNC: 0.51 MG/DL (ref 0.2–1)
BUN SERPL-MCNC: 13 MG/DL (ref 5–25)
CALCIUM SERPL-MCNC: 9.2 MG/DL (ref 8.3–10.1)
CHLORIDE SERPL-SCNC: 110 MMOL/L (ref 100–108)
CHOLEST SERPL-MCNC: 80 MG/DL (ref 50–200)
CO2 SERPL-SCNC: 24 MMOL/L (ref 21–32)
CREAT SERPL-MCNC: 0.84 MG/DL (ref 0.6–1.3)
CREAT UR-MCNC: 348 MG/DL
GFR SERPL CREATININE-BSD FRML MDRD: 111 ML/MIN/1.73SQ M
GLUCOSE P FAST SERPL-MCNC: 101 MG/DL (ref 65–99)
HDLC SERPL-MCNC: 55 MG/DL
LDLC SERPL CALC-MCNC: 15 MG/DL (ref 0–100)
MICROALBUMIN UR-MCNC: 30.5 MG/L (ref 0–20)
MICROALBUMIN/CREAT 24H UR: 9 MG/G CREATININE (ref 0–30)
NONHDLC SERPL-MCNC: 25 MG/DL
POTASSIUM SERPL-SCNC: 3.6 MMOL/L (ref 3.5–5.3)
PROT SERPL-MCNC: 7.9 G/DL (ref 6.4–8.2)
SL AMB POCT HEMOGLOBIN AIC: 7.5 (ref ?–6.5)
SODIUM SERPL-SCNC: 141 MMOL/L (ref 136–145)
TRIGL SERPL-MCNC: 48 MG/DL

## 2021-03-31 PROCEDURE — 82570 ASSAY OF URINE CREATININE: CPT | Performed by: FAMILY MEDICINE

## 2021-03-31 PROCEDURE — 82043 UR ALBUMIN QUANTITATIVE: CPT | Performed by: FAMILY MEDICINE

## 2021-03-31 PROCEDURE — 80061 LIPID PANEL: CPT

## 2021-03-31 PROCEDURE — 83036 HEMOGLOBIN GLYCOSYLATED A1C: CPT | Performed by: NURSE PRACTITIONER

## 2021-03-31 PROCEDURE — 99215 OFFICE O/P EST HI 40 MIN: CPT | Performed by: NURSE PRACTITIONER

## 2021-03-31 PROCEDURE — 36415 COLL VENOUS BLD VENIPUNCTURE: CPT

## 2021-03-31 PROCEDURE — G0008 ADMIN INFLUENZA VIRUS VAC: HCPCS | Performed by: NURSE PRACTITIONER

## 2021-03-31 PROCEDURE — 90682 RIV4 VACC RECOMBINANT DNA IM: CPT | Performed by: NURSE PRACTITIONER

## 2021-03-31 PROCEDURE — 80053 COMPREHEN METABOLIC PANEL: CPT

## 2021-03-31 NOTE — ASSESSMENT & PLAN NOTE
Slightly above goal for diabetic  States compliance  Reviewed DASH diet  Handout provided  Advised to return to gym  No med adjustments today  Will f/u in 3 months

## 2021-03-31 NOTE — ASSESSMENT & PLAN NOTE
Jump in A1C from 6 5 to 7 5  Pt states he eats pancakes, desserts, cake  Reviewed diabetic diet and provided handout  Will have care nurse reach out to patient as I believe component of cognitive deficit playing into control issues  States he takes metformin and Januvia but I believe he hasn't been taking Januvia as it wasn't refilled in >6mos  IRIS and foot exam today    Lab Results   Component Value Date    HGBA1C 7 5 (A) 03/31/2021

## 2021-03-31 NOTE — ASSESSMENT & PLAN NOTE
Desires to quit  Tried gum, didn't work    Declines add'l meds at this time     -Referral to smoking cessation program

## 2021-03-31 NOTE — ASSESSMENT & PLAN NOTE
Does not follow with psych  Denies recent psychosis, krishna, depression  Last psych encounter in July during ED visit  Pt declines referral to psych or therapy  Will have complex care nurse reach to review medication compliance with patient

## 2021-03-31 NOTE — PROGRESS NOTES
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 jam    ? ½ cup of sorbet or gelatin    ? 1 cup of lemonade    · Fats:  2 to 3 servings each week    ? 1 teaspoon of soft margarine or vegetable oil    ? 1 tablespoon of mayonnaise    ? 2 tablespoons of salad dressing    Foods to avoid:   · Grains:      ? Baked goods, such as doughnuts, pastries, cookies, and biscuits (high in fat and sugar)    ? Mixes for cornbread and biscuits, packaged foods, such as bread stuffing, rice and pasta mixes, macaroni and cheese, and instant cereals (high in sodium)    · Fruits and vegetables:      ? Regular, canned vegetables (high in sodium)    ? Sauerkraut, pickled vegetables, and other foods prepared in brine (high in sodium)    ? Fried vegetables or vegetables in butter or high-fat sauces    ? Fruit in cream or butter sauce (high in fat)    · Dairy:      ? Whole milk, 2% milk, and cream (high in fat)    ? Regular cheese and processed cheese (high in fat and sodium)    · Meats and protein foods:      ? Smoked or cured meat, such as corned beef, gillette, ham, hot dogs, and sausage (high in fat and sodium)    ?  Canned beans and canned meats or spreads, such as potted meats, sardines, anchovies, and imitation seafood (high in sodium)    ? Deli or lunch meats, such as bologna, ham, turkey, and roast beef (high in sodium)    ? High-fat meat (T-bone steak, regular hamburger, and ribs)    ? Whole eggs and egg yolks (high in fat)    · Other:      ? Seasonings made with salt, such as garlic salt, celery salt, onion salt, seasoned salt, meat tenderizers, and monosodium glutamate (MSG)    ? Miso soup and canned or dried soup mixes (high in sodium)    ? Regular soy sauce, barbecue sauce, teriyaki sauce, steak sauce, Worcestershire sauce, and most flavored vinegars (high in sodium)    ? Regular condiments, such as mustard, ketchup, and salad dressings (high in sodium)    ? Gravy and sauces, such as [place] or cheese sauces (high in sodium and fat)    ? Drinks high in sugar, such as soda or fruit drinks    ? Snack foods, such as salted chips, popcorn, pretzels, pork rinds, salted crackers, and salted nuts    ? Frozen foods, such as dinners, entrees, vegetables with sauces, and breaded meats (high in sodium)    Other guidelines to follow:   · Maintain a healthy weight  Your risk for heart disease is higher if you are overweight  Your healthcare provider may suggest that you lose weight if you are overweight  You can lose weight by eating fewer calories and foods that have added sugars and fat  The DASH meal plan can help you do this  Decrease calories by eating smaller portions at each meal and fewer snacks  Ask your healthcare provider for more information about how to lose weight  · Exercise regularly  Regular exercise can help you reach or maintain a healthy weight  Regular exercise can also help decrease your blood pressure and improve your cholesterol levels  Get 30 minutes or more of moderate exercise each day of the week  To lose weight, get at least 60 minutes of exercise  Talk to your healthcare provider about the best exercise program for you  · Limit alcohol  Women should limit alcohol to 1 drink a day   Men should limit alcohol to 2 drinks a day  A drink of alcohol is 12 ounces of beer, 5 ounces of wine, or 1½ ounces of liquor  © Copyright [address] Information is for End User's use only and may not be sold, redistributed or otherwise used for commercial purposes  All illustrations and images included in CareNotes® are the copyrighted property of A D A M , Inc  or [facility] Eliezer Henning   The above information is an  only  It is not intended as medical advice for individual conditions or treatments  Talk to your doctor, nurse or pharmacist before following any medical regimen to see if it is safe and effective for you

## 2021-03-31 NOTE — PATIENT INSTRUCTIONS
Meal Planning with Diabetes Exchanges   AMBULATORY CARE:   Diabetes exchanges  are servings of food that contain similar amounts of carbohydrate, fat, protein, and calories within a food group  The exchanges can be used to develop a healthy meal plan that helps to keep your blood sugar within the recommended levels  A meal plan with the right amount of carbohydrates is especially important  Your blood sugar naturally rises after you eat carbohydrates  Too many carbohydrates in 1 meal or snack can raise your blood sugar level  Carbohydrates are found in starches, fruit, milk, yogurt, and sweets  Call your doctor if:   · You have high blood sugar levels during a certain time of day, or almost all of the time  · You often have low blood sugar levels  · You have questions or concerns about your condition or care  Create a meal plan with exchanges:  A dietitian will work with you to develop a healthy meal plan that is right for you  This meal plan will include the amount of exchanges you can have from each food group throughout the day  Follow your meal plan by keeping track of the amount of exchanges you eat for each meal and snack  Your meal plan will be based on your age, weight, blood sugar levels, medicine, and activity level  Starch food group exchanges:  Each exchange below contains about 15 grams of carbohydrate , 3 grams of protein, 1 gram of fat, and 80 calories  · 1 ounce of white, whole wheat or rye bread (1 slice)    · 1 ounce of bagel (about ¼ of a bagel)    · 1 6-inch flour or corn tortilla or 1 4-inch pancake (about ¼ inch thick)    · ?  cup of cooked pasta or rice    · ¾ cup of dry, ready-to-eat cereal with no sugar added     · ½ cup of cooked cereal, such as oatmeal    · 3 aranza cracker squares or 8 animal crackers    · 6 saltine-type crackers or     · 3 cups of popcorn or ¾ ounce of pretzels     · Starchy vegetables and cooked legumes:      ? ½ cup of corn, green peas, sweet potatoes, or mashed potatoes     ? ¼ of a large baked potato     ? 1 cup of acorn, butternut squash, or pumpkin     ? ½ cup of beans, lentils, or peas (such as escalona, kidney, or black-eyed)    ? ? cup of lima beans    Fruit group exchanges:  Each exchange contains about 15 grams of carbohydrate  and 60 calories  · 1 small (4 ounce) apple, banana orange, or nectarine    · ½ cup of canned or fresh fruit    · ½ cup (4 ounces) of unsweetened fruit juice    · 2 tablespoons of dried fruit    Milk group exchanges:  Each exchange contains about 12 grams of carbohydrate  and 8 grams of protein  The amount of fat and calories in each serving depends on the type of milk (such as whole, low-fat, or fat-free)  · 1 cup fat-free or low-fat milk    · ¾ cup of plain, nonfat yogurt    · 1 cup fat-free, flavored yogurt with artificial (no calorie) sweetener    Non-starchy vegetable group exchanges:  Each exchange contains about 5 grams of carbohydrate , 2 grams of protein, and 25 calories  Examples include beets, broccoli, cabbage, carrots, cauliflower, cucumber, mushrooms, tomatoes, and zucchini  · ½ cup of cooked vegetables or 1 cup of raw vegetables     · ½ cup of vegetable juice    Meat and meat substitute group exchanges:  Each exchange of a lean meat  listed below contains about 7 grams of protein, 0 to 3 grams of fat, and 45 calories  The meat and meat substitutes food group does not contain any carbohydrates  Medium and high-fat meats have more calories  · 1 ounce of chicken or turkey without skin, or 1 ounce of fish (not breaded or fried)     · 1 ounce of lean beef, pork, or lamb     · 1-inch cube or 1 ounce of low-fat cheese     · 2 egg whites or ¼ cup of egg substitute     · ½ cup of tofu    Sweets, desserts, and other carbohydrate group exchanges:   · Sweets and other desserts:  Each exchange has about 15 grams of carbohydrate   ? 1 ounce of majo food cake or 2-inch square cake (unfrosted)    ? 2 small cookies     ?  ½ cup of sugar-free, fat-free ice cream    ? 1 tablespoon of syrup, jam, jelly, table sugar, or honey    · Combination foods:     ? 1 cup of an entrée, such as lasagna, spaghetti with meatballs, macaroni and cheese, and chili with beans (each serving counts as 2 carbohydrate exchanges )     ? 1 cup of tomato or vegetable beef soup (each serving counts as 1 carbohydrate exchange )    Fat group exchanges:  Each exchange contains 5 grams of fat and 45 calories  · 1 teaspoon of oil (such as canola, olive, or corn oil)     · 6 almonds or cashews, 10 peanuts, or 4 pecan halves     · 2 tablespoons of avocado     · ½ tablespoon of peanut butter     · 1 teaspoon of regular margarine or 2 teaspoons of low-fat margarine     · 1 teaspoon of regular butter or 1 tablespoon of low-fat butter     · 1 teaspoon of regular mayonnaise or 1 tablespoon of low-fat mayonnaise     · 1 tablespoon of regular salad dressing or 2 tablespoons of low-fat salad dressing    Free foods: The foods on this list are called free foods because they have very few calories  Free foods usually do not increase your blood sugar if you limit them  · 1 tablespoon of catsup or taco sauce     · ¼ cup of salsa     · 2 tablespoons of sugar-free syrup or 2 teaspoons of light jam or jelly     · 1 tablespoon of fat-free salad dressing     · 4 tablespoons of fat-free margarine or fat-free mayonnaise     · Sugar-free drinks: diet soda, sugar-free drink mixes, or mineral water     · Low-sodium bouillon or fat-free broth     · Mustard     · Seasonings such as spices, herbs, and garlic     · Sugar-free gelatin without added fruit    Other healthy nutrition guidelines:   · Limit drinks with sugar substitutes  Your dietitian or healthcare provider will encourage you to drink water  Water helps your kidneys to function properly  Ask how much water you should drink every day  · Eat more fiber    Choose foods that are good sources of fiber, such as fruits, vegetables, and whole grains  Cereals that contain 5 or more grams of fiber per serving are good sources of fiber  Legumes such as garbanzo, escalona beans, kidney beans, and lentils are also good sources  · Limit fat  Ask your dietitian or healthcare provider how much fat you should eat each day  Choose foods low in fat, saturated fat, trans fat, and cholesterol  Examples include turkey or chicken without the skin, fish, lean cuts of meat, and beans  Low-fat dairy foods, such as low-fat or fat-free milk and low-fat yogurt are also good choices  Omega-3 fatty acids are healthy fats that are found in canola oil, soybean oil and fatty fish  [place], albacore tuna, and sardines are good sources of omega 3 fatty acids  Eat 2 servings of these types of fish each week  Do not eat fried fish  · Limit sugar  Sugar and sweets must be counted toward the carbohydrate exchanges that you can have within your meal plan  Limit sugar and sweets because they are usually also high in calories and fat  Eat smaller portions of sweets by sharing a dessert or asking for a child-size portion at a restaurant  · Limit sodium  (salt) to about 2,300 mg per day  You may need to eat even less sodium if you have certain medical conditions  Foods high in sodium include soy sauce, potato chips, and soup  · Limit alcohol  Ask your healthcare provider if it is safe for you to drink alcohol  If alcohol is safe for you to have, eat a meal when you drink alcohol  If you drink alcohol on an empty stomach, your blood sugar may drop to a low level  Women 21 years or older and men 72 years or older should limit alcohol to 1 drink a day  Men aged 24 to 59 years should limit alcohol to 2 drinks a day  A drink of alcohol is 5 ounces of wine, 12 ounces of beer, or 1½ ounces of liquor  Other ways to manage your diabetes:   · Control your blood sugar level  Test your blood sugar level regularly and keep a record of the results   Ask your healthcare provider when and how often to test your blood sugar  You may need to check your blood sugar level at least 3 times each day  · Talk to your healthcare provider about your weight  Ask if you need to lose weight, and how much you need to lose  If you are overweight, you may need to make other changes to lose weight  Ask your healthcare provider to help you create a weight loss program      · Get regular physical activity  Physical activity can help decrease your blood sugar level  It can also help to decrease your risk for heart disease and help you lose weight  Adults should have moderate intensity physical activity for at least 150 minutes every week  Spread the amount of activity over at least 3 days a week  Do not skip more than 2 days in a row  Children should get at least 60 minutes of moderate physical activity on most days of the week  Examples of moderate physical activity include brisk walking, running, and swimming  Do not sit for longer than 30 minutes  Work with your healthcare provider to create a plan for physical activity  © Copyright [address] Information is for End User's use only and may not be sold, redistributed or otherwise used for commercial purposes  All illustrations and images included in CareNotes® are the copyrighted property of A D A M , Inc  or [facility]  The above information is an  only  It is not intended as medical advice for individual conditions or treatments  Talk to your doctor, nurse or pharmacist before following any medical regimen to see if it is safe and effective for you  DASH Eating Plan   WHAT YOU NEED TO KNOW:   The DASH (Dietary Approaches to Stop Hypertension) Eating Plan is designed to help prevent or lower high blood pressure  It can also help to lower LDL (bad) cholesterol and decrease your risk of heart disease  The plan is low in sodium, sugar, unhealthy fats, and total fat  It is high in potassium, calcium, magnesium, and fiber  These nutrients are added when you eat more fruits, vegetables, and whole grains  DISCHARGE INSTRUCTIONS:   Your sodium limit each day: Your dietitian will tell you how much sodium is safe for you to have each day  People with high blood pressure should have no more than 1,500 to 2,300 mg of sodium in a day  A teaspoon (tsp) of salt has 2,300 mg of sodium  This may seem like a difficult goal, but small changes to the foods you eat can make a big difference  Your healthcare provider or dietitian can help you create a meal plan that follows your sodium limit  How to limit sodium:   · Read food labels  Food labels can help you choose foods that are low in sodium  The amount of sodium is listed in milligrams (mg)  The % Daily Value (DV) column tells you how much of your daily needs are met by 1 serving of the food for each nutrient listed  Choose foods that have less than 5% of the DV of sodium  These foods are considered low in sodium  Foods that have 20% or more of the DV of sodium are considered high in sodium  Avoid foods that have more than 300 mg of sodium in each serving  Choose foods that say low-sodium, reduced-sodium, or no salt added on the food label  · Avoid salt  Do not salt food at the table, and add very little salt to foods during cooking  Use herbs and spices, such as onions, garlic, and salt-free seasonings to add flavor to foods  Try lemon or lime juice or vinegar to give foods a tart flavor  Use hot peppers or a small amount of hot pepper sauce to add a spicy flavor to foods  · Ask about salt substitutes  Ask your healthcare provider if you may use salt substitutes  Some salt substitutes have ingredients that can be harmful if you have certain health conditions  · Choose foods carefully at restaurants  Meals from restaurants, especially fast food restaurants, are often high in sodium   Some restaurants have nutrition information that tells you the amount of sodium in their foods  Ask to have your food prepared with less, or no salt  What you need to know about fats:   · Include healthy fats  Examples are unsaturated fats and omega-3 fatty acids  Unsaturated fats are found in soybean, canola, olive, or sunflower oil, and liquid and soft tub margarines  Omega-3 fatty acids are found in fatty fish, such as salmon, tuna, mackerel, and sardines  It is also found in flaxseed oil and ground flaxseed  · Avoid unhealthy fats  Do not eat unhealthy fats, such as saturated fats and trans fats  Saturated fats are found in foods that contain fat from animals  Examples are fatty meats, whole milk, butter, cream, and other dairy foods  It is also found in shortening, stick margarine, palm oil, and coconut oil  Trans fats are found in fried foods, crackers, chips, and baked goods made with margarine or shortening  Foods to include: With the DASH eating plan, you need to eat a certain number of servings from each food group  This will help you get enough of certain nutrients and limit others  The amount of servings you should eat depends on how many calories you need  Your dietitian can tell you how many calories you need  The number of servings listed next to the food groups below are for people who need about 2,000 calories each day  · Grains:  6 to 8 servings (3 of these servings should be whole-grain foods)    ? 1 slice of whole-grain bread     ? 1 ounce of dry cereal    ? ½ cup of cooked cereal, pasta, or brown rice    · Vegetables and fruits:  4 to 5 servings of fruits and 4 to 5 servings of vegetables    ? 1 medium fruit    ? ½ cup of frozen, canned (no added salt), or chopped fresh vegetables     ? ½ cup of fresh, frozen, dried, or canned fruit (canned in light syrup or fruit juice)    ? ½ cup of vegetable or fruit juice    · Dairy:  2 to 3 servings    ? 1 cup of nonfat (skim) or 1% milk    ? 1½ ounces of fat-free or low-fat cheese    ?  6 ounces of nonfat or low-fat yogurt    · Lean meat, poultry, and fish:  6 ounces or less    ? Poultry (chicken, turkey) with no skin    ? Fish (especially fatty fish, such as salmon, fresh tuna, or mackerel)    ? Lean beef and pork (loin, round, extra lean hamburger)    ? Egg whites and egg substitutes    · Nuts, seeds, and legumes:  4 to 5 servings each week    ? ½ cup of cooked beans and peas    ? 1½ ounces of unsalted nuts    ? 2 tablespoons of peanut butter or seeds    · Sweets and added sugars:  5 or less each week    ? 1 tablespoon of sugar, jelly, or jam    ? ½ cup of sorbet or gelatin    ? 1 cup of lemonade    · Fats:  2 to 3 servings each week    ? 1 teaspoon of soft margarine or vegetable oil    ? 1 tablespoon of mayonnaise    ? 2 tablespoons of salad dressing    Foods to avoid:   · Grains:      ? Baked goods, such as doughnuts, pastries, cookies, and biscuits (high in fat and sugar)    ? Mixes for cornbread and biscuits, packaged foods, such as bread stuffing, rice and pasta mixes, macaroni and cheese, and instant cereals (high in sodium)    · Fruits and vegetables:      ? Regular, canned vegetables (high in sodium)    ? Sauerkraut, pickled vegetables, and other foods prepared in brine (high in sodium)    ? Fried vegetables or vegetables in butter or high-fat sauces    ? Fruit in cream or butter sauce (high in fat)    · Dairy:      ? Whole milk, 2% milk, and cream (high in fat)    ? Regular cheese and processed cheese (high in fat and sodium)    · Meats and protein foods:      ? Smoked or cured meat, such as corned beef, gillette, ham, hot dogs, and sausage (high in fat and sodium)    ? Canned beans and canned meats or spreads, such as potted meats, sardines, anchovies, and imitation seafood (high in sodium)    ? Deli or lunch meats, such as bologna, ham, turkey, and roast beef (high in sodium)    ? High-fat meat (T-bone steak, regular hamburger, and ribs)    ?  Whole eggs and egg yolks (high in fat)    · Other:      ? Seasonings made with salt, such as garlic salt, celery salt, onion salt, seasoned salt, meat tenderizers, and monosodium glutamate (MSG)    ? Miso soup and canned or dried soup mixes (high in sodium)    ? Regular soy sauce, barbecue sauce, teriyaki sauce, steak sauce, Worcestershire sauce, and most flavored vinegars (high in sodium)    ? Regular condiments, such as mustard, ketchup, and salad dressings (high in sodium)    ? Gravy and sauces, such as [place] or cheese sauces (high in sodium and fat)    ? Drinks high in sugar, such as soda or fruit drinks    ? Snack foods, such as salted chips, popcorn, pretzels, pork rinds, salted crackers, and salted nuts    ? Frozen foods, such as dinners, entrees, vegetables with sauces, and breaded meats (high in sodium)    Other guidelines to follow:   · Maintain a healthy weight  Your risk for heart disease is higher if you are overweight  Your healthcare provider may suggest that you lose weight if you are overweight  You can lose weight by eating fewer calories and foods that have added sugars and fat  The DASH meal plan can help you do this  Decrease calories by eating smaller portions at each meal and fewer snacks  Ask your healthcare provider for more information about how to lose weight  · Exercise regularly  Regular exercise can help you reach or maintain a healthy weight  Regular exercise can also help decrease your blood pressure and improve your cholesterol levels  Get 30 minutes or more of moderate exercise each day of the week  To lose weight, get at least 60 minutes of exercise  Talk to your healthcare provider about the best exercise program for you  · Limit alcohol  Women should limit alcohol to 1 drink a day  Men should limit alcohol to 2 drinks a day  A drink of alcohol is 12 ounces of beer, 5 ounces of wine, or 1½ ounces of liquor      © Copyright [address] Information is for End User's use only and may not be sold, redistributed or otherwise used for commercial purposes  All illustrations and images included in CareNotes® are the copyrighted property of A D A M , Inc  or Marek Felix  The above information is an  only  It is not intended as medical advice for individual conditions or treatments  Talk to your doctor, nurse or pharmacist before following any medical regimen to see if it is safe and effective for you

## 2021-04-01 ENCOUNTER — PATIENT OUTREACH (OUTPATIENT)
Dept: FAMILY MEDICINE CLINIC | Facility: CLINIC | Age: 61
End: 2021-04-01

## 2021-04-01 NOTE — LETTER
Date: 04/01/21    Dear Shaw Stokes,   My name is Darryle Mayers; I am a registered nurse care manager working with [facility]    I have not been able to reach you and would like to set a time that I can talk with you over the phone  My work is to help patients that have complex medical conditions get the care they need  This includes patients who may have been in the hospital or emergency room  Please call me with any questions you may have  I look forward to meeting with you      Sincerely,  [facility]  [phone]  Outpatient Care Manager

## 2021-04-01 NOTE — PROGRESS NOTES
Direct Provider Referral     Chart reviewed  I attempted to reach this patient in the past with no response  I called the number in the chart and it is linked to the [address]; patient does not have a cell phone  I am mailing the patient a letter in hopes he will respond

## 2021-04-16 ENCOUNTER — PATIENT OUTREACH (OUTPATIENT)
Dept: FAMILY MEDICINE CLINIC | Facility: CLINIC | Age: 61
End: 2021-04-16

## 2021-05-05 ENCOUNTER — PATIENT OUTREACH (OUTPATIENT)
Dept: FAMILY MEDICINE CLINIC | Facility: CLINIC | Age: 61
End: 2021-05-05

## 2021-05-05 NOTE — PROGRESS NOTES
MICHELLE RUIZ received notification from  staff that patient presented to the office at this time  Patient stated he was out of his medications and did not have adequate insurance coverage to cover the costs of medications  Patient states he does not have a cell phone at the moment  Patient agreed to meet with MICHELLE RUIZ on Friday 5/7/21 at 9:00 AM to review options for medications

## 2021-05-07 ENCOUNTER — PATIENT OUTREACH (OUTPATIENT)
Dept: FAMILY MEDICINE CLINIC | Facility: CLINIC | Age: 61
End: 2021-05-07

## 2021-05-07 DIAGNOSIS — E11.69 TYPE 2 DIABETES MELLITUS WITH OTHER SPECIFIED COMPLICATION, UNSPECIFIED WHETHER LONG TERM INSULIN USE (HCC): ICD-10-CM

## 2021-05-07 DIAGNOSIS — I10 BENIGN ESSENTIAL HTN: Chronic | ICD-10-CM

## 2021-05-07 NOTE — TELEPHONE ENCOUNTER
Pt came to our office requesting med refills on;    atorvastatin (LIPITOR) 40 mg tablet    lisinopril (ZESTRIL) 20 mg tablet    amLODIPine (NORVASC) 10 mg tablet    metFORMIN (GLUCOPHAGE-XR) 500 mg 24 hr tablet

## 2021-05-07 NOTE — PROGRESS NOTES
MICHELLE RUIZ met with pt scheduled to discuss concerns with medications  Patient stated the medications were at the pharmacy and were waiting for him  Patient denied not being able to afford the medications at this time and stated he would be able to  the medications after our appointment  MICHELLE RUIZ did explain that most of patient's medications are on the [facility] $4 list, however explained is [address] [facility] pharmacy is working for him he can choose to continue utilizing them as well  MICHELLE RUIZ attempted to inquire lack of insurance information  Patient states he does have insurance coverage       MICHELLE RUIZ spoke about [employer] patient assistance program  Patient appeared agreeable to application process and did sign off on application at this time  MICHELLE RUIZ explained its not guaranteed he will be eligible for program but that we could attempt  MICHELLE RUIZ unsure if patient was able to comprehend during conversation as patient had difficulty answering questions

## 2021-05-10 RX ORDER — ATORVASTATIN CALCIUM 40 MG/1
40 TABLET, FILM COATED ORAL DAILY
Qty: 90 TABLET | Refills: 1 | Status: SHIPPED | OUTPATIENT
Start: 2021-05-10

## 2021-05-10 RX ORDER — LISINOPRIL 20 MG/1
20 TABLET ORAL DAILY
Qty: 90 TABLET | Refills: 1 | Status: SHIPPED | OUTPATIENT
Start: 2021-05-10

## 2021-05-10 RX ORDER — AMLODIPINE BESYLATE 10 MG/1
10 TABLET ORAL DAILY
Qty: 90 TABLET | Refills: 1 | Status: SHIPPED | OUTPATIENT
Start: 2021-05-10

## 2021-05-10 RX ORDER — METFORMIN HYDROCHLORIDE 500 MG/1
500 TABLET, EXTENDED RELEASE ORAL 2 TIMES DAILY WITH MEALS
Qty: 180 TABLET | Refills: 1 | Status: SHIPPED | OUTPATIENT
Start: 2021-05-10